# Patient Record
Sex: FEMALE | Race: BLACK OR AFRICAN AMERICAN | NOT HISPANIC OR LATINO | Employment: FULL TIME | URBAN - METROPOLITAN AREA
[De-identification: names, ages, dates, MRNs, and addresses within clinical notes are randomized per-mention and may not be internally consistent; named-entity substitution may affect disease eponyms.]

---

## 2019-09-20 ENCOUNTER — HOSPITAL ENCOUNTER (EMERGENCY)
Facility: HOSPITAL | Age: 29
Discharge: HOME | End: 2019-09-20
Attending: EMERGENCY MEDICINE
Payer: COMMERCIAL

## 2019-09-20 VITALS
HEIGHT: 64 IN | HEART RATE: 92 BPM | TEMPERATURE: 98 F | BODY MASS INDEX: 27.43 KG/M2 | WEIGHT: 160.69 LBS | RESPIRATION RATE: 16 BRPM | OXYGEN SATURATION: 98 % | SYSTOLIC BLOOD PRESSURE: 155 MMHG | DIASTOLIC BLOOD PRESSURE: 92 MMHG

## 2019-09-20 DIAGNOSIS — S61.419A LACERATION OF HAND WITHOUT FOREIGN BODY, UNSPECIFIED LATERALITY, INITIAL ENCOUNTER: Primary | ICD-10-CM

## 2019-09-20 PROCEDURE — 63600175 PHARM REV CODE 636 W HCPCS: Performed by: NURSE PRACTITIONER

## 2019-09-20 PROCEDURE — 99284 EMERGENCY DEPT VISIT MOD MDM: CPT | Mod: 25

## 2019-09-20 PROCEDURE — 90714 TD VACC NO PRESV 7 YRS+ IM: CPT | Performed by: NURSE PRACTITIONER

## 2019-09-20 PROCEDURE — 90471 IMMUNIZATION ADMIN: CPT | Performed by: NURSE PRACTITIONER

## 2019-09-20 PROCEDURE — 12001 RPR S/N/AX/GEN/TRNK 2.5CM/<: CPT

## 2019-09-20 PROCEDURE — 25000003 PHARM REV CODE 250: Performed by: NURSE PRACTITIONER

## 2019-09-20 RX ORDER — MONTELUKAST SODIUM 10 MG/1
1 TABLET ORAL DAILY
COMMUNITY
Start: 2019-03-15

## 2019-09-20 RX ORDER — TRAMADOL HYDROCHLORIDE 50 MG/1
50 TABLET ORAL EVERY 6 HOURS PRN
Qty: 12 TABLET | Refills: 0 | Status: SHIPPED | OUTPATIENT
Start: 2019-09-20 | End: 2019-10-02

## 2019-09-20 RX ORDER — HYDROCODONE BITARTRATE AND ACETAMINOPHEN 5; 325 MG/1; MG/1
1 TABLET ORAL
Status: COMPLETED | OUTPATIENT
Start: 2019-09-20 | End: 2019-09-20

## 2019-09-20 RX ADMIN — HYDROCODONE BITARTRATE AND ACETAMINOPHEN 1 TABLET: 5; 325 TABLET ORAL at 10:09

## 2019-09-20 RX ADMIN — CLOSTRIDIUM TETANI TOXOID ANTIGEN (FORMALDEHYDE INACTIVATED) AND CORYNEBACTERIUM DIPHTHERIAE TOXOID ANTIGEN (FORMALDEHYDE INACTIVATED) 0.5 ML: 5; 2 INJECTION, SUSPENSION INTRAMUSCULAR at 10:09

## 2019-09-20 NOTE — PROVIDER PROGRESS NOTES - EMERGENCY DEPT.
Encounter Date: 9/20/2019    ED Physician Progress Notes        Physician Note:   Correction on number of sutures:  4 sutures placed

## 2019-09-20 NOTE — ED PROVIDER NOTES
Encounter Date: 9/20/2019       History     Chief Complaint   Patient presents with    Laceration     pt reports cutting her Lt hand on a  today while at work     29 year old female with complaint of laceration to left hand X one hour.  Pt reports that she accidentally cut herself on a piece of metal while at work today. Moderate bleeding. Moderate pain.  No numbness or tingling.  No other complaints.         Review of patient's allergies indicates:  No Known Allergies  Past Medical History:   Diagnosis Date    Chronic headaches      Past Surgical History:   Procedure Laterality Date    ROTATOR CUFF REPAIR      SHOULDER SURGERY       Family History   Problem Relation Age of Onset    Breast cancer Neg Hx     Ovarian cancer Neg Hx      Social History     Tobacco Use    Smoking status: Never Smoker   Substance Use Topics    Alcohol use: No    Drug use: No     Review of Systems   Constitutional: Negative for fever.   HENT: Negative for sore throat.    Respiratory: Negative for shortness of breath.    Cardiovascular: Negative for chest pain.   Gastrointestinal: Negative for nausea.   Genitourinary: Negative for dysuria.   Musculoskeletal: Negative for back pain.        Left hand laceration    Skin: Negative for rash.   Neurological: Negative for weakness.   Hematological: Does not bruise/bleed easily.       Physical Exam     Initial Vitals [09/20/19 0943]   BP Pulse Resp Temp SpO2   (!) 155/92 92 16 98.4 °F (36.9 °C) 98 %      MAP       --         Physical Exam    Nursing note and vitals reviewed.  Constitutional: She appears well-developed and well-nourished.   HENT:   Head: Normocephalic and atraumatic.   Eyes: Conjunctivae and EOM are normal. Pupils are equal, round, and reactive to light.   Neck: Normal range of motion. Neck supple.   Cardiovascular: Normal rate, regular rhythm, normal heart sounds and intact distal pulses.   Pulmonary/Chest: Breath sounds normal.   Abdominal: Soft. There is no  tenderness. There is no rebound and no guarding.   Musculoskeletal: Normal range of motion.   2 cm laceration left thenar eminence, no tendon lacerations, full ROM left thumb without difficulty, cap refill brisk   Neurological: She is alert and oriented to person, place, and time. She has normal strength and normal reflexes.   Skin: Skin is warm and dry. Capillary refill takes less than 2 seconds.   Psychiatric: She has a normal mood and affect. Her behavior is normal. Thought content normal.         ED Course   Lac Repair  Date/Time: 9/20/2019 10:09 AM  Performed by: Christiano Powell NP  Authorized by: Bryn Maria MD   Comments: Wound injected with 5 cc plain lidocaine, irrigated with 400 cc NS, cleansed with betadine, no foreign bodies or tendon laceration, wound closed with #3 3-0 prolene simple interrupted sutures        Labs Reviewed - No data to display       Imaging Results    None                               Clinical Impression:       ICD-10-CM ICD-9-CM   1. Laceration of hand without foreign body, unspecified laterality, initial encounter S61.419A 882.0                                Christiano Powell NP  09/20/19 1010       Christiano Powell NP  09/20/19 1012

## 2019-09-20 NOTE — ED NOTES
Pt states she was at work when trying to disassemble a demo phone when a blade slipped and cut her (L) palm near the thumb. Good sensation. Able to move all fingers. Pain 10/10.  Patient identifiers verified and correct for Emeka Miramontes.  LOC: The patient is awake, alert and aware of environment with an appropriate affect, the patient is oriented x 3 and speaking appropriately.  APPEARANCE: Patient resting comfortably and in no acute distress, patient is clean and well groomed, patient's clothing is properly fastened.  SKIN: The skin is warm and dry, color consistent with ethnicity, patient has normal skin turgor and moist mucus membranes.  MUSCULOSKELETAL: Patient moving all extremities spontaneously.  RESPIRATORY: Airway is open and patent, respirations are spontaneous.  CARDIAC: Patient has a normal rate, no periphreal edema noted, capillary refill < 3 seconds.  ABDOMEN: Soft and non tender to palpation.

## 2019-09-25 PROCEDURE — 99283 EMERGENCY DEPT VISIT LOW MDM: CPT | Mod: 25

## 2019-09-25 PROCEDURE — 29125 APPL SHORT ARM SPLINT STATIC: CPT | Mod: LT

## 2019-09-26 ENCOUNTER — HOSPITAL ENCOUNTER (EMERGENCY)
Facility: HOSPITAL | Age: 29
Discharge: HOME | End: 2019-09-26
Attending: EMERGENCY MEDICINE
Payer: COMMERCIAL

## 2019-09-26 VITALS
RESPIRATION RATE: 20 BRPM | WEIGHT: 160.38 LBS | DIASTOLIC BLOOD PRESSURE: 76 MMHG | TEMPERATURE: 99 F | SYSTOLIC BLOOD PRESSURE: 148 MMHG | OXYGEN SATURATION: 100 % | HEIGHT: 65 IN | BODY MASS INDEX: 26.72 KG/M2 | HEART RATE: 90 BPM

## 2019-09-26 DIAGNOSIS — Z51.89 ENCOUNTER FOR WOUND RE-CHECK: Primary | ICD-10-CM

## 2019-09-26 DIAGNOSIS — S60.012A CONTUSION OF LEFT THUMB WITHOUT DAMAGE TO NAIL, INITIAL ENCOUNTER: ICD-10-CM

## 2019-09-26 PROCEDURE — 29125 APPL SHORT ARM SPLINT STATIC: CPT | Mod: LT

## 2019-09-26 NOTE — ED PROVIDER NOTES
History      Chief Complaint   Patient presents with    Wound Check     pt reports she had stitches placed on Friday and noticed some discoloration to palm yesterday       Review of patient's allergies indicates:  No Known Allergies     HPI   HPI    9/26/2019, 12:44 AM   History obtained from the friend and patient      History of Present Illness: Emeka Miramontes is a 29 y.o. female patient with no PMHX presents to the Emergency Department with c/o pain to left palm, thumb, and suture site onset today.  Symptoms are intermittent and moderate in severity. The pt has 4 well approximated sutures noted to left palm posterior thumb with bruising noted. Patient denies any fever, drainage, redness, swelling, numbness or tingling, decrease ROM. All other sxs at this time. No further complaints or concerns at this time.     Arrival mode: Personal vehicle      PCP: Primary Doctor No       Past Medical History:  Past Medical History:   Diagnosis Date    Chronic headaches        Past Surgical History:  Past Surgical History:   Procedure Laterality Date    ROTATOR CUFF REPAIR      SHOULDER SURGERY           Family History:  Family History   Problem Relation Age of Onset    Breast cancer Neg Hx     Ovarian cancer Neg Hx        Social History:  Social History     Tobacco Use    Smoking status: Never Smoker    Smokeless tobacco: Never Used   Substance and Sexual Activity    Alcohol use: No    Drug use: No    Sexual activity: Not Currently       ROS   Review of Systems   Constitutional: Negative for chills and fever.   HENT: Negative for congestion, ear pain, sinus pain and sore throat.    Eyes: Negative for pain.   Respiratory: Negative for cough, chest tightness and shortness of breath.    Cardiovascular: Negative for chest pain.   Gastrointestinal: Negative for abdominal pain, constipation, diarrhea, nausea and vomiting.   Genitourinary: Negative for decreased urine volume, difficulty urinating, dysuria, flank  pain, frequency, hematuria, vaginal bleeding and vaginal discharge.   Musculoskeletal: Negative for back pain, gait problem and neck pain.        Left hand pain      Skin: Positive for wound. Negative for rash.        4 suture to left palm      Neurological: Negative for dizziness, syncope, weakness and headaches.       Physical Exam      Initial Vitals [09/25/19 2356]   BP Pulse Resp Temp SpO2   (!) 148/76 90 20 98.8 °F (37.1 °C) 100 %      MAP       --          Physical Exam   Constitutional: She is oriented to person, place, and time. She appears well-developed and well-nourished. No distress.   HENT:   Head: Normocephalic and atraumatic.   Mouth/Throat: Oropharynx is clear and moist.   Eyes: Pupils are equal, round, and reactive to light. Conjunctivae and EOM are normal.   Neck: Normal range of motion. Neck supple. No neck rigidity. Normal range of motion present.   Cardiovascular: Normal rate, regular rhythm, normal heart sounds and intact distal pulses.   Pulses:       Radial pulses are 2+ on the right side, and 2+ on the left side.   Pulmonary/Chest: Effort normal and breath sounds normal. No respiratory distress. She exhibits no tenderness.   Abdominal: Soft. Bowel sounds are normal. There is no tenderness. There is no rigidity, no rebound and no guarding.   Musculoskeletal: Normal range of motion.        Hands:  Neurological: She is alert and oriented to person, place, and time.   Skin: Skin is warm and dry. Laceration noted.       Nursing Notes and Vital Signs Reviewed.    ED Course    Orthopedic Injury  Date/Time: 9/26/2019 1:29 AM  Performed by: Shae Jacinto NP  Authorized by: Tigre Elizabeth MD     Consent Done?:  Not Needed  Injury:     Injury location:  Hand    Location details:  Left hand    Injury type:  Soft tissue      Pre-procedure assessment:     Neurovascular status: Neurovascularly intact        Selections made in this section will also lock the Injury type section above.:      "Immobilization:  Brace    Splint type: Cock-up wrist splint.    Complications: No      Specimens: No      Implants: No    Post-procedure assessment:     Neurovascular status: Neurovascularly intact      Range of motion: splinted      Patient tolerance:  Patient tolerated the procedure well with no immediate complications      ED Vital Signs:  Vitals:    09/25/19 2356   BP: (!) 148/76   Pulse: 90   Resp: 20   Temp: 98.8 °F (37.1 °C)   TempSrc: Oral   SpO2: 100%   Weight: 72.7 kg (160 lb 6.2 oz)   Height: 5' 4.5" (1.638 m)       Abnormal Lab Results:  Labs Reviewed - No data to display     All Lab Results:  NONE    Imaging Results:  Imaging Results          X-Ray Hand 3 View Left (Preliminary result)  Result time 09/26/19 01:05:28    ED Interpretation by Shae Jacinto NP (09/26/19 01:05:28, [facility] - , Emergency Medicine)    I, hSae Jacinto NP independently interpreted left hand xray. Findings: No acute findings noted, no dislocation or fracture. Final results are pending radiologist review.                                             The Emergency Provider reviewed the vital signs and test results, which are outlined above.    ED Discussion     1:25 AM: Reassessed pt at this time. Pt states their condition has improved at this time. Discussed with pt  And family no acute findings on xray. Discussed pt dx of hand contusion, follow up with PCP or Orthopedic.  Informed patient to have wound re-evaluated at day 10 and possible sutures removed.  Discussed with patient we will give wrist splint and to wear for comfort.  Also informed pt and family the xrays were pending final radiologist review and will call with any acute findings.   All questions and concerns were addressed at this time. Pt expresses understanding of information and instructions, and is comfortable with plan to discharge. Pt is stable for discharge.    I discussed with patient and/or family/caretaker that negative X-ray does " not rule out occult fracture or other soft tissue injury.  Persistent pain greater than 7-10 days or increased pain requires follow up, specifically with orthopedics.      I discussed with patient and/or family/caretaker that evaluation in the ED does not suggest any emergent or life threatening medical conditions requiring immediate intervention beyond what was provided in the ED, and I believe patient is safe for discharge.  Regardless, an unremarkable evaluation in the ED does not preclude the development or presence of a serious of life threatening condition. As such, patient was instructed to return immediately for any worsening or change in current symptoms.    ED Medication(s):  Medications - No data to display  Discharge Medication List as of 9/26/2019  1:28 AM         Follow-up Information     [facility] [place] In 4 days.    Why:  For wound re-check, Return to ED for any concerns.  Contact information:  [address]  [address] [place] [address]  [phone]                       Medical Decision Making                     Clinical Impression       ICD-10-CM ICD-9-CM   1. Encounter for wound re-check Z51.89 V58.89   2. Contusion of left thumb without damage to nail, initial encounter S60.012A 923.3       Disposition:   Disposition: Discharged  Condition: Stable  .     Shae Jacinto NP  09/26/19 0136       Shae Jacinto NP  09/26/19 0157

## 2023-05-04 ENCOUNTER — HOSPITAL ENCOUNTER (OUTPATIENT)
Dept: CARDIOLOGY | Facility: HOSPITAL | Age: 33
Discharge: HOME | End: 2023-05-04
Attending: FAMILY MEDICINE
Payer: COMMERCIAL

## 2023-05-04 ENCOUNTER — OFFICE VISIT (OUTPATIENT)
Dept: INTERNAL MEDICINE | Facility: CLINIC | Age: 33
End: 2023-05-04
Payer: COMMERCIAL

## 2023-05-04 VITALS
WEIGHT: 197.06 LBS | BODY MASS INDEX: 32.83 KG/M2 | SYSTOLIC BLOOD PRESSURE: 136 MMHG | DIASTOLIC BLOOD PRESSURE: 100 MMHG | TEMPERATURE: 99 F | HEIGHT: 65 IN

## 2023-05-04 DIAGNOSIS — Z13.1 SCREENING FOR DIABETES MELLITUS: ICD-10-CM

## 2023-05-04 DIAGNOSIS — R11.2 NAUSEA AND VOMITING, UNSPECIFIED VOMITING TYPE: ICD-10-CM

## 2023-05-04 DIAGNOSIS — Z13.220 ENCOUNTER FOR LIPID SCREENING FOR CARDIOVASCULAR DISEASE: ICD-10-CM

## 2023-05-04 DIAGNOSIS — K92.0 HEMATEMESIS OF UNKNOWN ETIOLOGY: Primary | ICD-10-CM

## 2023-05-04 DIAGNOSIS — Z01.419 PAP SMEAR, AS PART OF ROUTINE GYNECOLOGICAL EXAMINATION: ICD-10-CM

## 2023-05-04 DIAGNOSIS — I10 PRIMARY HYPERTENSION: Chronic | ICD-10-CM

## 2023-05-04 DIAGNOSIS — Z11.3 ROUTINE SCREENING FOR STI (SEXUALLY TRANSMITTED INFECTION): ICD-10-CM

## 2023-05-04 DIAGNOSIS — G43.109 MIGRAINE WITH AURA AND WITHOUT STATUS MIGRAINOSUS, NOT INTRACTABLE: Chronic | ICD-10-CM

## 2023-05-04 DIAGNOSIS — Z13.6 ENCOUNTER FOR LIPID SCREENING FOR CARDIOVASCULAR DISEASE: ICD-10-CM

## 2023-05-04 DIAGNOSIS — R10.13 EPIGASTRIC DISCOMFORT: ICD-10-CM

## 2023-05-04 DIAGNOSIS — Z11.4 SCREENING FOR HIV WITHOUT PRESENCE OF RISK FACTORS: ICD-10-CM

## 2023-05-04 PROCEDURE — 99999 PR PBB SHADOW E&M-EST. PATIENT-LVL IV: CPT | Mod: PBBFAC,,, | Performed by: FAMILY MEDICINE

## 2023-05-04 PROCEDURE — 99999 PR PBB SHADOW E&M-EST. PATIENT-LVL IV: ICD-10-PCS | Mod: PBBFAC,,, | Performed by: FAMILY MEDICINE

## 2023-05-04 PROCEDURE — 93005 ELECTROCARDIOGRAM TRACING: CPT

## 2023-05-04 PROCEDURE — 99204 OFFICE O/P NEW MOD 45 MIN: CPT | Mod: S$GLB,,, | Performed by: FAMILY MEDICINE

## 2023-05-04 PROCEDURE — 99204 PR OFFICE/OUTPT VISIT, NEW, LEVL IV, 45-59 MIN: ICD-10-PCS | Mod: S$GLB,,, | Performed by: FAMILY MEDICINE

## 2023-05-04 PROCEDURE — 93010 EKG 12-LEAD: ICD-10-PCS | Mod: ,,, | Performed by: INTERNAL MEDICINE

## 2023-05-04 PROCEDURE — 93010 ELECTROCARDIOGRAM REPORT: CPT | Mod: ,,, | Performed by: INTERNAL MEDICINE

## 2023-05-04 RX ORDER — CEFUROXIME AXETIL 500 MG/1
500 TABLET ORAL 2 TIMES DAILY
COMMUNITY
Start: 2023-02-14 | End: 2023-05-04

## 2023-05-04 RX ORDER — PANTOPRAZOLE SODIUM 40 MG/1
40 TABLET, DELAYED RELEASE ORAL EVERY MORNING
Qty: 30 TABLET | Refills: 1 | Status: SHIPPED | OUTPATIENT
Start: 2023-05-04 | End: 2023-06-08 | Stop reason: SDUPTHER

## 2023-05-04 RX ORDER — FAMOTIDINE 20 MG/1
20 TABLET, FILM COATED ORAL
COMMUNITY
Start: 2023-05-01 | End: 2023-05-04

## 2023-05-04 RX ORDER — VERAPAMIL HYDROCHLORIDE 40 MG/1
TABLET ORAL
COMMUNITY
Start: 2023-03-16

## 2023-05-04 RX ORDER — FREMANEZUMAB-VFRM 225 MG/1.5ML
INJECTION SUBCUTANEOUS
COMMUNITY
End: 2023-05-04

## 2023-05-04 RX ORDER — CHLORTHALIDONE 25 MG/1
25 TABLET ORAL DAILY
Qty: 30 TABLET | Refills: 0 | Status: SHIPPED | OUTPATIENT
Start: 2023-05-04 | End: 2023-05-25 | Stop reason: SDUPTHER

## 2023-05-04 RX ORDER — PROMETHAZINE HYDROCHLORIDE AND DEXTROMETHORPHAN HYDROBROMIDE 6.25; 15 MG/5ML; MG/5ML
SYRUP ORAL
COMMUNITY
Start: 2023-03-16 | End: 2023-05-04

## 2023-05-04 RX ORDER — PREDNISONE 20 MG/1
20 TABLET ORAL
COMMUNITY
Start: 2023-02-14 | End: 2023-05-04

## 2023-05-04 RX ORDER — NABUMETONE 750 MG/1
750 TABLET, FILM COATED ORAL 2 TIMES DAILY PRN
COMMUNITY
Start: 2023-04-27 | End: 2023-05-04

## 2023-05-04 NOTE — PATIENT INSTRUCTIONS
2023        TO WHOM IT MAY CONCERN:     RE:  Emeka Miramontes ,  1990     Artur was treated by me on 23.    Artur reports that her symptoms started on Monday.    She is estimated to be able to return to work without restrictions in 2-3 days.    Please extend to Artur all due courtesy and consideration and excuse her absence.    Sincerely,     CAITLIN Cox MD

## 2023-05-04 NOTE — PROGRESS NOTES
OFFICE VISIT 5/4/23 10:30 AM CDT    Subjective   CHIEF COMPLAINT: Emesis and Abdominal Pain    She reports Monday she started having epigastric discomfort, nausea and vomiting. She went to Urgent Care clinic on 5/1 where they reportedly evaluated her with urine pregnancy test and test for Strep throat, but no other labs. Last emesis was yesterday morning. She says that vomiting started out as clear, but individually became pink and then red. No coffee ground emesis reported. She says that yesterday she consumed exclusively electrolyte drinks. She says that her symptoms were worst on Monday and Tuesday, and they are at least 40% improved today and getting better. She is able to drink adequately. Last bowel movement was yesterday and was reported as normal. No apparent blood in stool. No dark or tar like stool. She does not report chronic GERD symptoms. No history of peptic ulcer disease. She has never had upper G.I. No family history of peptic ulcer disease or G.I. cancer. She does not smoke, and she describes her alcohol consumption as modest. She reports occasional NSAID use for musculoskeletal pain, but she says she does not use NSAIDs on a chronic basis. I cautioned her against using any NSAID until her current G.I. problem is resolved. She does not appear dehydrated. Physical exam reveals very minimal generalized abdominal tenderness, but no epigastric or other focal/regional area of tenderness. We discussed differential diagnosis and risks and benefits of treatment options. As she is improving, it was agreed to proceed with evaluation as ordered, empiric treatment with proton pump inhibitor, and G.I. consult for further recommendations.    Hypertension is asymptomatic, but uncontrolled, effectively untreated. She is on verapamil prescribed by her neurologist for migraines. It appears that she was on lisinopril about a year ago, but she stopped it for unclear reasons.    She reports that her migraines are  "compensated/controlled and stable.    Review of Systems   Constitutional:  Negative for chills, diaphoresis and fever.   HENT:  Negative for sore throat.    Respiratory:  Negative for chest tightness and shortness of breath.    Cardiovascular:  Negative for chest pain.   Gastrointestinal:  Positive for abdominal pain, nausea and vomiting. Negative for abdominal distention and blood in stool.   Endocrine: Negative for polydipsia and polyuria.   Genitourinary:  Negative for dysuria, flank pain, hematuria and pelvic pain.       Objective   Vitals:    05/04/23 1105 05/04/23 1109   BP: (!) 138/102 (!) 136/100   BP Location: Right arm Left arm   Patient Position: Sitting Sitting   BP Method: Large (Manual) Large (Manual)   Temp: 98.6 °F (37 °C)    Weight: 89.4 kg (197 lb 1.5 oz)    Height: 5' 5" (1.651 m)    Physical Exam  Vitals reviewed.   Constitutional:       General: She is not in acute distress.     Appearance: Normal appearance. She is not ill-appearing, toxic-appearing or diaphoretic.   HENT:      Head: Normocephalic and atraumatic.      Mouth/Throat:      Mouth: Mucous membranes are moist.   Eyes:      General: No scleral icterus.     Conjunctiva/sclera: Conjunctivae normal.   Cardiovascular:      Rate and Rhythm: Normal rate and regular rhythm.      Heart sounds: Normal heart sounds.   Pulmonary:      Effort: Pulmonary effort is normal.      Breath sounds: Normal breath sounds.   Abdominal:      General: Bowel sounds are normal. There is no distension.      Palpations: Abdomen is soft. There is no mass.      Tenderness: There is abdominal tenderness (very minimial generalized). There is no guarding or rebound.   Skin:     General: Skin is warm and dry.   Neurological:      General: No focal deficit present.      Mental Status: She is alert and oriented to person, place, and time. Mental status is at baseline.   Psychiatric:         Mood and Affect: Mood normal.         Behavior: Behavior normal.         Thought " Content: Thought content normal.         Judgment: Judgment normal.        Assessment and Plan   1. Hematemesis of unknown etiology  -     pantoprazole (PROTONIX) 40 MG tablet; Take 1 tablet (40 mg total) by mouth every morning.  Dispense: 30 tablet; Refill: 1  -     CBC Without Differential; Future; Expected date: 05/04/2023  -     Comprehensive Metabolic Panel; Future; Expected date: 05/04/2023  -     E-Consult to Gastroenterology    2. Primary hypertension  Overview:  BP Readings from Last 6 Encounters:   05/04/23 (!) 136/100   03/15/22 (!) 140/80   09/25/19 (!) 148/76   09/20/19 (!) 155/92   07/20/16 112/74   08/11/15 120/82   Neurologist prescribed Calan (verapamil) 40 mg BID for migraine prevention.    Orders:  -     chlorthalidone (HYGROTEN) 25 MG Tab; Take 1 tablet (25 mg total) by mouth once daily.  Dispense: 30 tablet; Refill: 0  -     SCHEDULED EKG 12-LEAD (to Muse); Future    3. Migraine with aura and without status migrainosus, not intractable    4. Epigastric discomfort  -     pantoprazole (PROTONIX) 40 MG tablet; Take 1 tablet (40 mg total) by mouth every morning.  Dispense: 30 tablet; Refill: 1  -     CBC Without Differential; Future; Expected date: 05/04/2023  -     Comprehensive Metabolic Panel; Future; Expected date: 05/04/2023    5. Screening for HIV without presence of risk factors  -     HIV 1/2 Ag/Ab (4th Gen); Future; Expected date: 05/04/2023    6. Screening for diabetes mellitus  -     Hemoglobin A1C; Future; Expected date: 05/04/2023    7. Encounter for lipid screening for cardiovascular disease  -     Lipid Panel; Future; Expected date: 05/04/2023    8. Pap smear, as part of routine gynecological examination  -     Ambulatory referral/consult to Gynecology; Future; Expected date: 05/11/2023    9. Routine screening for STI (sexually transmitted infection)  -     RPR; Future; Expected date: 05/04/2023    10. Nausea and vomiting  -     E-Consult to Gastroenterology    Unless noted herein,  any chronic conditions are represented as and appear stable, and no other significant complaints or concerns were reported.    Follow up in about 1 week (around 5/11/2023) for review test results, discuss treatment plan, re-evaluation.   Future Appointments   Date Time Provider Department Center   5/19/2023  1:15 PM PRE-ADMIT, ENDO -[facility] Mitzy Modi   5/25/2023 10:00 AM Desirae Aguilera [facility]        Assessment and Plan    1. Primary hypertension  - chlorthalidone (HYGROTEN) 25 MG Tab; Take 1 tablet (25 mg total) by mouth once daily.  Dispense: 30 tablet; Refill: 0  - SCHEDULED EKG 12-LEAD (to Muse); Future    2. Hematemesis of unknown etiology  - pantoprazole (PROTONIX) 40 MG tablet; Take 1 tablet (40 mg total) by mouth every morning.  Dispense: 30 tablet; Refill: 1  - CBC Without Differential; Future  - Comprehensive Metabolic Panel; Future  - E-Consult to Gastroenterology    3. Migraine with aura and without status migrainosus, not intractable    4. Epigastric discomfort  - pantoprazole (PROTONIX) 40 MG tablet; Take 1 tablet (40 mg total) by mouth every morning.  Dispense: 30 tablet; Refill: 1  - CBC Without Differential; Future  - Comprehensive Metabolic Panel; Future    5. Screening for HIV without presence of risk factors  - HIV 1/2 Ag/Ab (4th Gen); Future    6. Screening for diabetes mellitus  - Hemoglobin A1C; Future    7. Encounter for lipid screening for cardiovascular disease  - Lipid Panel; Future    8. Pap smear, as part of routine gynecological examination  - Ambulatory referral/consult to Gynecology; Future    9. Routine screening for STI (sexually transmitted infection)  - RPR; Future    10. Nausea and vomiting  - E-Consult to Gastroenterology    Orders Placed This Encounter   Procedures    CBC Without Differential    Comprehensive Metabolic Panel    Hemoglobin A1C    Lipid Panel    HIV 1/2 Ag/Ab (4th Gen)    RPR    Ambulatory referral/consult to Gynecology  "   SCHEDULED EKG 12-LEAD (to Muse)        Documentation entered by me for this encounter may have been done in part using speech-recognition technology. Although I have made an effort to ensure accuracy, "sound like" errors may exist and should be interpreted in context.   "

## 2023-05-08 ENCOUNTER — E-CONSULT (OUTPATIENT)
Dept: GASTROENTEROLOGY | Facility: HOSPITAL | Age: 33
End: 2023-05-08
Payer: COMMERCIAL

## 2023-05-08 DIAGNOSIS — R10.13 EPIGASTRIC PAIN: Primary | ICD-10-CM

## 2023-05-08 PROCEDURE — 99446 PR INTERPROF, PHONE/INTERNET/EHR, CONSULT, 5-10 MINS: ICD-10-PCS | Mod: ,,, | Performed by: INTERNAL MEDICINE

## 2023-05-08 PROCEDURE — 99446 NTRPROF PH1/NTRNET/EHR 5-10: CPT | Mod: ,,, | Performed by: INTERNAL MEDICINE

## 2023-05-08 NOTE — CONSULTS
[facility] GASTROENTEROLOGY  Response for E-Consult     Patient Name: Emeka Miramontes  MRN: [id number]  Primary Care Provider: RAJAT Cxo MD   Requesting Provider: RAJAT Cox MD  E-Consult to Gastroenterology  Consult performed by: Rosario Stock MD  Consult ordered by: RAJAT Cox MD  Reason for consult: epigastric pain        Findings: 32 y.o female with epigastric pain and emesis. No coffee grounds, questionable hematemesis. Occasional NSAID use. No melena, physical exam benign per PCP. Labs on 5/4/23 normal CBC and CMP except elevated total protein. Needs EGD.    Recommendations:  Place order for referral to Endo for EGD. In comments place: Refer to econsult by Dr. Stock    I did not speak to the requesting provider verbally about this.     Total time of Consultation: 10 minute    Percentage of time spent on written/verbal discussion: 25%     *This eConsult is based on the clinical data available to me and is furnished without benefit of a physical examination. The eConsult will need to be interpreted in light of any clinical issues or changes in patient status not available to me at the time of filing this eConsults. Significant changes in patient condition or level of acuity should result in immediate formal consultation and reevaluation. Please alert me if you have further questions.    Thank you for your consult.     Rosario Stock MD  [facility] GASTROENTEROLOGY

## 2023-05-11 ENCOUNTER — OFFICE VISIT (OUTPATIENT)
Dept: INTERNAL MEDICINE | Facility: CLINIC | Age: 33
End: 2023-05-11
Payer: COMMERCIAL

## 2023-05-11 VITALS
HEIGHT: 65 IN | TEMPERATURE: 97 F | OXYGEN SATURATION: 98 % | DIASTOLIC BLOOD PRESSURE: 98 MMHG | WEIGHT: 194.25 LBS | SYSTOLIC BLOOD PRESSURE: 130 MMHG | HEART RATE: 80 BPM | BODY MASS INDEX: 32.36 KG/M2

## 2023-05-11 DIAGNOSIS — R11.2 NAUSEA AND VOMITING, UNSPECIFIED VOMITING TYPE: Primary | ICD-10-CM

## 2023-05-11 DIAGNOSIS — I10 PRIMARY HYPERTENSION: ICD-10-CM

## 2023-05-11 PROCEDURE — 99213 OFFICE O/P EST LOW 20 MIN: CPT | Mod: S$GLB,,, | Performed by: PHYSICIAN ASSISTANT

## 2023-05-11 PROCEDURE — 99999 PR PBB SHADOW E&M-EST. PATIENT-LVL V: CPT | Mod: PBBFAC,,, | Performed by: PHYSICIAN ASSISTANT

## 2023-05-11 PROCEDURE — 99999 PR PBB SHADOW E&M-EST. PATIENT-LVL V: ICD-10-PCS | Mod: PBBFAC,,, | Performed by: PHYSICIAN ASSISTANT

## 2023-05-11 PROCEDURE — 99213 PR OFFICE/OUTPT VISIT, EST, LEVL III, 20-29 MIN: ICD-10-PCS | Mod: S$GLB,,, | Performed by: PHYSICIAN ASSISTANT

## 2023-05-11 RX ORDER — OLMESARTAN MEDOXOMIL 20 MG/1
20 TABLET ORAL DAILY
Qty: 30 TABLET | Refills: 0 | Status: SHIPPED | OUTPATIENT
Start: 2023-05-11 | End: 2023-05-25 | Stop reason: DRUGHIGH

## 2023-05-11 NOTE — PROGRESS NOTES
Subjective:      Patient ID: Emeka Miramontes is a 32 y.o. female.    Chief Complaint: Follow-up    HPI  Here today for a follow up. Last visit added chlorthalidone to get better blood pressure control. She is also on verapamil but mainly for headache prevention. Does not check her blood pressure at home.   Reviewed e-consult with GI. Recommend EGD. Scope was never ordered. Will order for her today.   Pt reports that she has not had an episode of vomiting since Tuesday but still nauseated and mild epigastric pain. Vomit was clear on Tuesday, no blood.      Patient Active Problem List   Diagnosis    Dysmenorrhea    Primary hypertension    Migraine with aura and without status migrainosus, not intractable         Current Outpatient Medications:     ALBUTEROL INHL, Inhale into the lungs., Disp: , Rfl:     amitriptyline (ELAVIL) 25 MG tablet, , Disp: , Rfl:     busPIRone (BUSPAR) 15 MG tablet, TAKE 1/2 TABLET BY MOUTH TWICE DAILY FOR ANXIETY, Disp: , Rfl:     chlorthalidone (HYGROTEN) 25 MG Tab, Take 1 tablet (25 mg total) by mouth once daily., Disp: 30 tablet, Rfl: 0    montelukast (SINGULAIR) 10 mg tablet, Take 1 tablet by mouth once daily., Disp: , Rfl:     ondansetron (ZOFRAN) 4 MG tablet, , Disp: , Rfl:     pantoprazole (PROTONIX) 40 MG tablet, Take 1 tablet (40 mg total) by mouth every morning., Disp: 30 tablet, Rfl: 1    UBROGEPANT 100 mg tablet, Take 1 tablet   as needed take one tablet at the onset of headache. Can repeat in 2 hours if needed. Max 200 mg/day, Disp: , Rfl:     verapamiL (CALAN) 40 MG Tab, verapamil 40 mg tablet  TAKE 1 TABLET BY MOUTH TWICE DAILY, Disp: , Rfl:     olmesartan (BENICAR) 20 MG tablet, Take 1 tablet (20 mg total) by mouth once daily., Disp: 30 tablet, Rfl: 0    Review of Systems   Constitutional:  Negative for activity change, appetite change, chills, diaphoresis, fatigue, fever and unexpected weight change.   HENT: Negative.  Negative for congestion, hearing loss, postnasal  "drip, rhinorrhea, sore throat, trouble swallowing and voice change.    Eyes: Negative.  Negative for visual disturbance.   Respiratory: Negative.  Negative for cough, choking, chest tightness and shortness of breath.    Cardiovascular:  Negative for chest pain, palpitations and leg swelling.   Gastrointestinal:  Positive for abdominal pain and nausea. Negative for abdominal distention, blood in stool, constipation, diarrhea and vomiting.   Endocrine: Negative for cold intolerance, heat intolerance, polydipsia and polyuria.   Genitourinary: Negative.  Negative for difficulty urinating and frequency.   Musculoskeletal:  Negative for arthralgias, back pain, gait problem, joint swelling and myalgias.   Skin:  Negative for color change, pallor, rash and wound.   Neurological:  Positive for headaches. Negative for dizziness, tremors, weakness, light-headedness and numbness.   Hematological:  Negative for adenopathy.   Psychiatric/Behavioral:  Negative for behavioral problems, confusion, self-injury, sleep disturbance and suicidal ideas. The patient is not nervous/anxious.    Objective:   BP (!) 130/98 (BP Location: Left arm, Patient Position: Sitting, BP Method: Medium (Manual))   Pulse 80   Temp 97 °F (36.1 °C) (Tympanic)   Ht 5' 5" (1.651 m)   Wt 88.1 kg (194 lb 3.6 oz)   SpO2 98%   BMI 32.32 kg/m²     Physical Exam  Vitals reviewed.   Constitutional:       General: She is not in acute distress.     Appearance: Normal appearance. She is well-developed. She is not ill-appearing, toxic-appearing or diaphoretic.   HENT:      Head: Normocephalic and atraumatic.      Right Ear: External ear normal.      Left Ear: External ear normal.      Nose: Nose normal.   Eyes:      Conjunctiva/sclera: Conjunctivae normal.      Pupils: Pupils are equal, round, and reactive to light.   Cardiovascular:      Rate and Rhythm: Normal rate and regular rhythm.      Heart sounds: Normal heart sounds. No murmur heard.    No friction rub. No " gallop.   Pulmonary:      Effort: Pulmonary effort is normal. No respiratory distress.      Breath sounds: Normal breath sounds. No wheezing or rales.   Chest:      Chest wall: No tenderness.   Abdominal:      General: There is no distension.      Palpations: Abdomen is soft.      Tenderness: There is no abdominal tenderness.   Musculoskeletal:         General: Normal range of motion.      Cervical back: Normal range of motion and neck supple.   Lymphadenopathy:      Cervical: No cervical adenopathy.   Skin:     General: Skin is warm and dry.      Capillary Refill: Capillary refill takes less than 2 seconds.      Findings: No rash.   Neurological:      Mental Status: She is alert and oriented to person, place, and time.      Motor: No weakness.      Coordination: Coordination normal.      Gait: Gait normal.   Psychiatric:         Mood and Affect: Mood normal.         Behavior: Behavior normal.         Thought Content: Thought content normal.         Judgment: Judgment normal.       Assessment:     1. Nausea and vomiting, unspecified vomiting type    2. Primary hypertension      Plan:   Nausea and vomiting, unspecified vomiting type  -     Ambulatory referral/consult to Endo Procedure ; Future; Expected date: 05/12/2023    Primary hypertension  -     olmesartan (BENICAR) 20 MG tablet; Take 1 tablet (20 mg total) by mouth once daily.  Dispense: 30 tablet; Refill: 0      -continue chlorthalidone and verapamil. Add olmesartan  -recheck in 2 weeks.     Follow up in 2 weeks (on 5/25/2023), or if symptoms worsen or fail to improve.

## 2023-05-19 ENCOUNTER — HOSPITAL ENCOUNTER (OUTPATIENT)
Dept: PREADMISSION TESTING | Facility: HOSPITAL | Age: 33
Discharge: HOME | End: 2023-05-19
Attending: COLON & RECTAL SURGERY
Payer: COMMERCIAL

## 2023-05-19 DIAGNOSIS — R11.2 NAUSEA AND VOMITING, UNSPECIFIED VOMITING TYPE: Primary | ICD-10-CM

## 2023-05-25 ENCOUNTER — OFFICE VISIT (OUTPATIENT)
Dept: INTERNAL MEDICINE | Facility: CLINIC | Age: 33
End: 2023-05-25
Payer: COMMERCIAL

## 2023-05-25 VITALS
BODY MASS INDEX: 31.92 KG/M2 | DIASTOLIC BLOOD PRESSURE: 90 MMHG | OXYGEN SATURATION: 98 % | TEMPERATURE: 98 F | WEIGHT: 191.56 LBS | HEIGHT: 65 IN | HEART RATE: 78 BPM | SYSTOLIC BLOOD PRESSURE: 132 MMHG

## 2023-05-25 DIAGNOSIS — I10 PRIMARY HYPERTENSION: Primary | Chronic | ICD-10-CM

## 2023-05-25 PROCEDURE — 99999 PR PBB SHADOW E&M-EST. PATIENT-LVL IV: ICD-10-PCS | Mod: PBBFAC,,, | Performed by: PHYSICIAN ASSISTANT

## 2023-05-25 PROCEDURE — 99213 PR OFFICE/OUTPT VISIT, EST, LEVL III, 20-29 MIN: ICD-10-PCS | Mod: S$GLB,,, | Performed by: PHYSICIAN ASSISTANT

## 2023-05-25 PROCEDURE — 99999 PR PBB SHADOW E&M-EST. PATIENT-LVL IV: CPT | Mod: PBBFAC,,, | Performed by: PHYSICIAN ASSISTANT

## 2023-05-25 PROCEDURE — 99213 OFFICE O/P EST LOW 20 MIN: CPT | Mod: S$GLB,,, | Performed by: PHYSICIAN ASSISTANT

## 2023-05-25 RX ORDER — CHLORTHALIDONE 25 MG/1
25 TABLET ORAL DAILY
Qty: 90 TABLET | Refills: 3 | Status: SHIPPED | OUTPATIENT
Start: 2023-05-25 | End: 2023-12-07 | Stop reason: SDUPTHER

## 2023-05-25 RX ORDER — OLMESARTAN MEDOXOMIL 40 MG/1
40 TABLET ORAL DAILY
Qty: 90 TABLET | Refills: 3 | Status: SHIPPED | OUTPATIENT
Start: 2023-05-25 | End: 2023-12-07 | Stop reason: SDUPTHER

## 2023-05-25 NOTE — PROGRESS NOTES
Subjective:      Patient ID: Emeka Miramontes is a 32 y.o. female.    Chief Complaint: Follow-up    HPI  Here today for a blood pressure follow up after adding olmesartan. She is also on chlorthalidone and verapamil.   Doing well on both medications without any SE.   No chest pain. Still having some epigastric pain, nausea, and vomiting. Scheduled for an EGD on Tuesday.     Patient Active Problem List   Diagnosis    Dysmenorrhea    Primary hypertension    Migraine with aura and without status migrainosus, not intractable         Current Outpatient Medications:     ALBUTEROL INHL, Inhale into the lungs., Disp: , Rfl:     amitriptyline (ELAVIL) 25 MG tablet, , Disp: , Rfl:     busPIRone (BUSPAR) 15 MG tablet, TAKE 1/2 TABLET BY MOUTH TWICE DAILY FOR ANXIETY, Disp: , Rfl:     montelukast (SINGULAIR) 10 mg tablet, Take 1 tablet by mouth once daily., Disp: , Rfl:     ondansetron (ZOFRAN) 4 MG tablet, , Disp: , Rfl:     pantoprazole (PROTONIX) 40 MG tablet, Take 1 tablet (40 mg total) by mouth every morning., Disp: 30 tablet, Rfl: 1    UBROGEPANT 100 mg tablet, Take 1 tablet   as needed take one tablet at the onset of headache. Can repeat in 2 hours if needed. Max 200 mg/day, Disp: , Rfl:     verapamiL (CALAN) 40 MG Tab, verapamil 40 mg tablet  TAKE 1 TABLET BY MOUTH TWICE DAILY, Disp: , Rfl:     chlorthalidone (HYGROTEN) 25 MG Tab, Take 1 tablet (25 mg total) by mouth once daily., Disp: 90 tablet, Rfl: 3    olmesartan (BENICAR) 40 MG tablet, Take 1 tablet (40 mg total) by mouth once daily., Disp: 90 tablet, Rfl: 3    Review of Systems   Constitutional:  Negative for activity change, appetite change, chills, diaphoresis, fatigue, fever and unexpected weight change.   HENT: Negative.  Negative for congestion, hearing loss, postnasal drip, rhinorrhea, sore throat, trouble swallowing and voice change.    Eyes: Negative.  Negative for visual disturbance.   Respiratory: Negative.  Negative for cough, choking, chest  "tightness and shortness of breath.    Cardiovascular:  Negative for chest pain, palpitations and leg swelling.   Gastrointestinal:  Negative for abdominal distention, abdominal pain, blood in stool, constipation, diarrhea, nausea and vomiting.   Endocrine: Negative for cold intolerance, heat intolerance, polydipsia and polyuria.   Genitourinary: Negative.  Negative for difficulty urinating and frequency.   Musculoskeletal:  Negative for arthralgias, back pain, gait problem, joint swelling and myalgias.   Skin:  Negative for color change, pallor, rash and wound.   Neurological:  Negative for dizziness, tremors, weakness, light-headedness, numbness and headaches.   Hematological:  Negative for adenopathy.   Psychiatric/Behavioral:  Negative for behavioral problems, confusion, self-injury, sleep disturbance and suicidal ideas. The patient is not nervous/anxious.    Objective:   BP (!) 132/90 (BP Location: Left arm, Patient Position: Sitting, BP Method: Medium (Manual))   Pulse 78   Temp 97.8 °F (36.6 °C) (Tympanic)   Ht 5' 5" (1.651 m)   Wt 86.9 kg (191 lb 9.3 oz)   SpO2 98%   BMI 31.88 kg/m²     Physical Exam  Vitals and nursing note reviewed.   Constitutional:       General: She is not in acute distress.     Appearance: Normal appearance. She is well-developed. She is not ill-appearing, toxic-appearing or diaphoretic.   HENT:      Head: Normocephalic and atraumatic.   Cardiovascular:      Rate and Rhythm: Normal rate and regular rhythm.      Heart sounds: Normal heart sounds. No murmur heard.    No friction rub. No gallop.   Pulmonary:      Effort: Pulmonary effort is normal. No respiratory distress.      Breath sounds: Normal breath sounds. No wheezing or rales.   Musculoskeletal:         General: Normal range of motion.   Skin:     General: Skin is warm.      Capillary Refill: Capillary refill takes less than 2 seconds.      Findings: No rash.   Neurological:      Mental Status: She is alert and oriented to " person, place, and time.      Motor: No weakness.      Gait: Gait normal.   Psychiatric:         Mood and Affect: Mood normal.         Behavior: Behavior normal.         Thought Content: Thought content normal.         Judgment: Judgment normal.       Assessment:     1. Primary hypertension      Plan:   Primary hypertension  -     olmesartan (BENICAR) 40 MG tablet; Take 1 tablet (40 mg total) by mouth once daily.  Dispense: 90 tablet; Refill: 3  -     chlorthalidone (HYGROTEN) 25 MG Tab; Take 1 tablet (25 mg total) by mouth once daily.  Dispense: 90 tablet; Refill: 3    -increase olmesartan from 20 to 40.   -continue chlorthalidone and verapamil as prescribed    Follow up in about 2 weeks (around 6/8/2023), or if symptoms worsen or fail to improve.

## 2023-05-30 ENCOUNTER — ANESTHESIA EVENT (OUTPATIENT)
Dept: ENDOSCOPY | Facility: HOSPITAL | Age: 33
End: 2023-05-30
Payer: COMMERCIAL

## 2023-05-30 ENCOUNTER — ANESTHESIA (OUTPATIENT)
Dept: ENDOSCOPY | Facility: HOSPITAL | Age: 33
End: 2023-05-30
Payer: COMMERCIAL

## 2023-05-30 ENCOUNTER — HOSPITAL ENCOUNTER (OUTPATIENT)
Facility: HOSPITAL | Age: 33
Discharge: HOME | End: 2023-05-30
Attending: INTERNAL MEDICINE | Admitting: INTERNAL MEDICINE
Payer: COMMERCIAL

## 2023-05-30 DIAGNOSIS — R11.14 BILIOUS VOMITING WITH NAUSEA: Primary | ICD-10-CM

## 2023-05-30 DIAGNOSIS — R10.13 EPIGASTRIC PAIN: ICD-10-CM

## 2023-05-30 PROBLEM — R63.4 WEIGHT LOSS, UNINTENTIONAL: Status: ACTIVE | Noted: 2023-05-30

## 2023-05-30 LAB
B-HCG UR QL: NEGATIVE
CTP QC/QA: YES

## 2023-05-30 PROCEDURE — 63600175 PHARM REV CODE 636 W HCPCS: Performed by: NURSE ANESTHETIST, CERTIFIED REGISTERED

## 2023-05-30 PROCEDURE — 43239 EGD BIOPSY SINGLE/MULTIPLE: CPT | Performed by: INTERNAL MEDICINE

## 2023-05-30 PROCEDURE — 43239 EGD BIOPSY SINGLE/MULTIPLE: CPT | Mod: ,,, | Performed by: INTERNAL MEDICINE

## 2023-05-30 PROCEDURE — 63600175 PHARM REV CODE 636 W HCPCS: Performed by: INTERNAL MEDICINE

## 2023-05-30 PROCEDURE — 37000008 HC ANESTHESIA 1ST 15 MINUTES: Performed by: INTERNAL MEDICINE

## 2023-05-30 PROCEDURE — 37000009 HC ANESTHESIA EA ADD 15 MINS: Performed by: INTERNAL MEDICINE

## 2023-05-30 PROCEDURE — 27201012 HC FORCEPS, HOT/COLD, DISP: Performed by: INTERNAL MEDICINE

## 2023-05-30 PROCEDURE — 88305 TISSUE EXAM BY PATHOLOGIST: ICD-10-PCS | Mod: 26,,, | Performed by: PATHOLOGY

## 2023-05-30 PROCEDURE — 88305 TISSUE EXAM BY PATHOLOGIST: CPT | Mod: 26,,, | Performed by: PATHOLOGY

## 2023-05-30 PROCEDURE — 88305 TISSUE EXAM BY PATHOLOGIST: CPT | Performed by: PATHOLOGY

## 2023-05-30 PROCEDURE — 81025 URINE PREGNANCY TEST: CPT | Performed by: INTERNAL MEDICINE

## 2023-05-30 PROCEDURE — 43239 PR EGD, FLEX, W/BIOPSY, SGL/MULTI: ICD-10-PCS | Mod: ,,, | Performed by: INTERNAL MEDICINE

## 2023-05-30 PROCEDURE — 25000003 PHARM REV CODE 250: Performed by: NURSE ANESTHETIST, CERTIFIED REGISTERED

## 2023-05-30 RX ORDER — LIDOCAINE HYDROCHLORIDE 10 MG/ML
INJECTION, SOLUTION EPIDURAL; INFILTRATION; INTRACAUDAL; PERINEURAL
Status: DISCONTINUED | OUTPATIENT
Start: 2023-05-30 | End: 2023-05-30

## 2023-05-30 RX ORDER — PROPOFOL 10 MG/ML
VIAL (ML) INTRAVENOUS
Status: DISCONTINUED | OUTPATIENT
Start: 2023-05-30 | End: 2023-05-30

## 2023-05-30 RX ORDER — ONDANSETRON 2 MG/ML
4 INJECTION INTRAMUSCULAR; INTRAVENOUS ONCE
Status: COMPLETED | OUTPATIENT
Start: 2023-05-30 | End: 2023-05-30

## 2023-05-30 RX ADMIN — ONDANSETRON 4 MG: 2 INJECTION INTRAMUSCULAR; INTRAVENOUS at 01:05

## 2023-05-30 RX ADMIN — ONDANSETRON 4 MG: 2 INJECTION INTRAMUSCULAR; INTRAVENOUS at 02:05

## 2023-05-30 RX ADMIN — PROPOFOL 100 MG: 10 INJECTION, EMULSION INTRAVENOUS at 01:05

## 2023-05-30 RX ADMIN — LIDOCAINE HYDROCHLORIDE 100 MG: 10 SOLUTION INTRAVENOUS at 01:05

## 2023-05-30 RX ADMIN — SODIUM CHLORIDE, POTASSIUM CHLORIDE, SODIUM LACTATE AND CALCIUM CHLORIDE: 600; 310; 30; 20 INJECTION, SOLUTION INTRAVENOUS at 01:05

## 2023-05-30 NOTE — H&P
PRE PROCEDURE H&P    Patient Name: Emeka Miramontes  MRN: [id number]  : 1990  Date of Procedure:  2023  Referring Physician: Desirae Aguilera*  Primary Physician: RAJAT Cox MD  Procedure Physician: Rosario Stock MD       Planned Procedure: EGD  Diagnosis:    Abdominal pain    Chief Complaint: Same as above    HPI: Patient is an 32 y.o. female is here for the above. Referred for epigastric abd pain, N/V and unintentional weight loss since 2023. States she has lost 20 lbs since 2023. No hematemesis. No tobacco or alcohol use. No previous EGD. Has multiple mouth piercings. These need to be removed before proceeding per anesthesia. No NSAID use. Partner Piper at bedside.    2022: 175kbs  2023: 191 lbs    Last colonoscopy: none  Anticoagulation: none    Past Medical History:   Past Medical History:   Diagnosis Date    Anxiety     Asthma     Depression     Hypertension     unsure if she has HTN; started this due to migraine medication that can cause her BP to go up    Migraine with aura and without status migrainosus, not intractable 2023    Migraines         Past Surgical History:  Past Surgical History:   Procedure Laterality Date    KNEE SURGERY Right     ROTATOR CUFF REPAIR          Home Medications:  Prior to Admission medications    Medication Sig Start Date End Date Taking? Authorizing Provider   ALBUTEROL INHL Inhale into the lungs.   Yes Historical Provider   amitriptyline (ELAVIL) 25 MG tablet  3/10/22  Yes Historical Provider   busPIRone (BUSPAR) 15 MG tablet TAKE 1/2 TABLET BY MOUTH TWICE DAILY FOR ANXIETY 2/3/22  Yes Historical Provider   chlorthalidone (HYGROTEN) 25 MG Tab Take 1 tablet (25 mg total) by mouth once daily. 23 Yes Desirae Aguilera PA-C   olmesartan (BENICAR) 40 MG tablet Take 1 tablet (40 mg total) by mouth once daily. 23 Yes Desirae Aguilera PA-C   ondansetron (ZOFRAN) 4 MG tablet  3/10/22  Yes Historical  "Provider   pantoprazole (PROTONIX) 40 MG tablet Take 1 tablet (40 mg total) by mouth every morning. 5/4/23 5/3/24 Yes RAJAT Cox MD   UBROGEPANT 100 mg tablet Take 1 tablet   as needed take one tablet at the onset of headache. Can repeat in 2 hours if needed. Max 200 mg/day 3/10/22  Yes Historical Provider   verapamiL (CALAN) 40 MG Tab verapamil 40 mg tablet   TAKE 1 TABLET BY MOUTH TWICE DAILY 3/16/23  Yes Historical Provider   montelukast (SINGULAIR) 10 mg tablet Take 1 tablet by mouth once daily. 3/15/19   Historical Provider        Allergies:  Review of patient's allergies indicates:   Allergen Reactions    Aspirin Hives        Social History:   Social History     Socioeconomic History    Marital status: Single   Tobacco Use    Smoking status: Never    Smokeless tobacco: Never   Substance and Sexual Activity    Alcohol use: Yes    Drug use: No    Sexual activity: Not Currently       Family History:  Family History   Problem Relation Age of Onset    Hypertension Mother     Breast cancer Mother 68    Ovarian cancer Neg Hx        ROS: No acute cardiac events, no acute respiratory complaints.     Physical Exam (all patients):    /70 (BP Location: Left arm, Patient Position: Lying)   Pulse 78   Temp 98.1 °F (36.7 °C) (Temporal)   Resp 18   Ht 5' 5" (1.651 m)   Wt 86.6 kg (191 lb)   LMP 05/29/2023   SpO2 99%   Breastfeeding No   BMI 31.78 kg/m²   Lungs: Clear to auscultation bilaterally, respirations unlabored  Heart: Regular rate and rhythm, S1 and S2 normal, no obvious murmurs  Abdomen:         Soft, non-tender, bowel sounds normal, no masses, no organomegaly    Lab Results   Component Value Date    WBC 5.66 05/04/2023    MCV 95 05/04/2023    RDW 13.8 05/04/2023     05/04/2023    GLU 92 05/04/2023    HGBA1C 5.7 (H) 05/04/2023    BUN 13 05/04/2023     05/04/2023    K 4.5 05/04/2023     05/04/2023        SEDATION PLAN: per anesthesia      History reviewed, vital signs " satisfactory, cardiopulmonary status satisfactory, sedation options, risks and plans have been discussed with the patient  All their questions were answered and the patient agrees to the sedation procedures as planned and the patient is deemed an appropriate candidate for the sedation as planned.    The risks, benefits and alternatives of the procedure were discussed with the patient in detail. This discussion was had in the presence of her partner Piper and endoscopy staff. The risks include, risks of adverse reaction to sedation requiring the use of reversal agents, bleeding requiring blood transfusion, perforation requiring surgical intervention and technical failure. Other risks include aspiration leading to respiratory distress and respiratory failure resulting in endotracheal intubation and mechanical ventilation including death. If anesthesia is being utilized for this procedure, it is up to the anesthesiologist to determine airway safety including elective endotracheal intubation. Questions were answered, they agree to proceed. There was no language barriers.      Procedure explained to patient, informed consent obtained and placed in chart.    Rosario Stock  5/30/2023  12:58 PM

## 2023-05-30 NOTE — ANESTHESIA PREPROCEDURE EVALUATION
05/30/2023  Emeka Miramontes is a 32 y.o., female.      Pre-op Assessment    I have reviewed the Patient Summary Reports.     I have reviewed the Nursing Notes. I have reviewed the NPO Status.   I have reviewed the Medications.     Review of Systems  Anesthesia Hx:  No problems with previous Anesthesia    Social:  Non-Smoker    Cardiovascular:   Hypertension Sinus bradycardia   Otherwise normal ECG   No previous ECGs available   Confirmed by MOOKIE POLANCO, PIEDAD ([id number]) on 5/4/2023 9:20:25 PM   Pulmonary:   Asthma    Hepatic/GI:   Epigastric pain, N/V   Neurological:   Headaches    Psych:   Psychiatric History anxiety          Physical Exam  General: Well nourished, Cooperative, Alert and Oriented    Airway:  Mallampati: II           Anesthesia Plan  Type of Anesthesia, risks & benefits discussed:    Anesthesia Type: MAC, Gen Natural Airway  Intra-op Monitoring Plan: Standard ASA Monitors  Post Op Pain Control Plan: IV/PO Opioids PRN  Induction:  IV  Informed Consent: Informed consent signed with the Patient and all parties understand the risks and agree with anesthesia plan.  All questions answered.   ASA Score: 2  Day of Surgery Review of History & Physical: H&P Update referred to the surgeon/provider.I have interviewed and examined the patient. I have reviewed the patient's H&P dated: There are no significant changes.     Ready For Surgery From Anesthesia Perspective.     .

## 2023-05-30 NOTE — LETTER
May 30, 2023         [id number] [place] [address]  Phone: [phone]  Fax: [phone]       Patient: Emeka Miramontes   YOB: 1990  Date of Visit: 05/30/2023    To Whom It May Concern:    Tracy Miramontes  was at [facility] on 05/30/2023. The patient may return to work/school on May 31st with no restrictions. If you have any questions or concerns, or if I can be of further assistance, please do not hesitate to contact me.    Sincerely,    Valeriano DAVIS,   Endoscopy  [phone]

## 2023-05-30 NOTE — ANESTHESIA POSTPROCEDURE EVALUATION
Anesthesia Post Evaluation    Patient: Emeka Miramontes    Procedure(s) Performed: Procedure(s) (LRB):  EGD (ESOPHAGOGASTRODUODENOSCOPY) (N/A)    Final Anesthesia Type: MAC      Patient location during evaluation: GI PACU  Patient participation: Yes- Able to Participate  Level of consciousness: awake and alert  Post-procedure vital signs: reviewed and stable  Pain management: adequate  Airway patency: patent    PONV status at discharge: No PONV  Anesthetic complications: no      Cardiovascular status: hemodynamically stable  Respiratory status: unassisted, room air and spontaneous ventilation  Hydration status: euvolemic  Follow-up not needed.          Vitals Value Taken Time   /68 05/30/23 1348   Temp 36.6 °C (97.9 °F) 05/30/23 1318   Pulse 77 05/30/23 1348   Resp 18 05/30/23 1348   SpO2 100 % 05/30/23 1348         Event Time   Out of Recovery 14:31:08         Pain/Latisha Score: Latisha Score: 10 (5/30/2023  2:30 PM)

## 2023-05-30 NOTE — LETTER
May 30, 2023         [address] [place] [address]  Phone: [phone]  Fax: [phone]       Patient: Emeka Miramontes   YOB: 1990  Date of Visit: 05/30/2023    To Whom It May Concern:    Tracy Miramontes  was at [facility] on 05/30/2023. The patient may return to work/school on 06/01/2023 with no restrictions. If you have any questions or concerns, or if I can be of further assistance, please do not hesitate to contact me.    Sincerely,    Ynes Steiner RN

## 2023-05-30 NOTE — TRANSFER OF CARE
"Anesthesia Transfer of Care Note    Patient: Emeka Miramontes    Procedure(s) Performed: Procedure(s) (LRB):  EGD (ESOPHAGOGASTRODUODENOSCOPY) (N/A)    Patient location: GI    Anesthesia Type: MAC    Transport from OR: Transported from OR on room air with adequate spontaneous ventilation    Post pain: adequate analgesia    Post assessment: no apparent anesthetic complications    Post vital signs: stable    Level of consciousness: awake, alert and oriented    Nausea/Vomiting: no nausea/vomiting    Complications: none    Transfer of care protocol was followed      Last vitals:   Visit Vitals  /70 (BP Location: Left arm, Patient Position: Lying)   Pulse 78   Temp 36.7 °C (98.1 °F) (Temporal)   Resp 18   Ht 5' 5" (1.651 m)   Wt 86.6 kg (191 lb)   LMP 05/29/2023   SpO2 99%   Breastfeeding No   BMI 31.78 kg/m²     "

## 2023-05-30 NOTE — PROVATION PATIENT INSTRUCTIONS
Discharge Summary/Instructions after an Endoscopic Procedure  Patient Name: Emeka Miramontes  Patient MRN: [id number]  Patient YOB: 1990  Tuesday, May 30, 2023 Rosario Stock MD  Dear patient,  As a result of recent federal legislation (The Federal Cures Act), you may   receive lab or pathology results from your procedure in your [facility]   account before your physician is able to contact you. Your physician or   their representative will relay the results to you with their   recommendations at their soonest availability.  Thank you,  RESTRICTIONS:  During your procedure today, you received medications for sedation.  These   medications may affect your judgment, balance and coordination.  Therefore,   for 24 hours, you have the following restrictions:   - DO NOT drive a car, operate machinery, make legal/financial decisions,   sign important papers or drink alcohol.    ACTIVITY:  Today: no heavy lifting, straining or running due to procedural   sedation/anesthesia.  The following day: return to full activity including work.  DIET:  Eat and drink normally unless instructed otherwise.     TREATMENT FOR COMMON SIDE EFFECTS:  - Mild abdominal pain, nausea, belching, bloating or excessive gas:  rest,   eat lightly and use a heating pad.  - Sore Throat: treat with throat lozenges and/or gargle with warm salt   water.  - Because air was used during the procedure, expelling large amounts of air   from your rectum or belching is normal.  - If a bowel prep was taken, you may not have a bowel movement for 1-3 days.    This is normal.  SYMPTOMS TO WATCH FOR AND REPORT TO YOUR PHYSICIAN:  1. Abdominal pain or bloating, other than gas cramps.  2. Chest pain.  3. Back pain.  4. Signs of infection such as: chills or fever occurring within 24 hours   after the procedure.  5. Rectal bleeding, which would show as bright red, maroon, or black stools.   (A tablespoon of blood from the rectum is not serious, especially if    hemorrhoids are present.)  6. Vomiting.  7. Weakness or dizziness.  GO DIRECTLY TO THE NEAREST EMERGENCY ROOM IF YOU HAVE ANY OF THE FOLLOWING:      Difficulty breathing              Chills and/or fever over 101 F   Persistent vomiting and/or vomiting blood   Severe abdominal pain   Severe chest pain   Black, tarry stools   Bleeding- more than one tablespoon   Any other symptom or condition that you feel may need urgent attention  Your doctor recommends these additional instructions:  If any biopsies were taken, your doctors clinic will contact you in 1 to 2   weeks with any results.  - Discharge patient to home (with escort).   - Resume previous diet.   - Continue present medications.   - Await pathology results.   - Perform an abdominal ultrasound. This will be scheduled for you according   to your availability. Please call the Radiology department at [phone].     - If symptoms persist recommend GI evaluation.  - Return to primary care physician.  For questions, problems or results please call your physician Rosario Stock MD at Work:  [phone]  If you have any questions about the above instructions, call the GI   department at [phone] or call the endoscopy unit at [phone]   from 7am until 3 pm.  [facility], EMERGENCY ROOM PHONE NUMBER:   [phone]  IF A COMPLICATION OR EMERGENCY SITUATION ARISES AND YOU ARE UNABLE TO REACH   YOUR PHYSICIAN - GO DIRECTLY TO THE EMERGENCY ROOM.  I have read or have had read to me these discharge instructions for my   procedure and have received a written copy.  I understand these   instructions and will follow-up with my physician if I have any questions.     __________________________________       _____________________________________  Nurse Signature                                          Patient/Designated   Responsible Party Signature  MD Rosario Sanchez MD  5/30/2023 1:20:08 PM  This report has been  verified and signed electronically.  Dear patient,  As a result of recent federal legislation (The Federal Cures Act), you may   receive lab or pathology results from your procedure in your [facility]   account before your physician is able to contact you. Your physician or   their representative will relay the results to you with their   recommendations at their soonest availability.  Thank you,  PROVATION

## 2023-05-31 ENCOUNTER — HOSPITAL ENCOUNTER (OUTPATIENT)
Dept: RADIOLOGY | Facility: HOSPITAL | Age: 33
Discharge: HOME | End: 2023-05-31
Attending: INTERNAL MEDICINE
Payer: COMMERCIAL

## 2023-05-31 VITALS
HEIGHT: 65 IN | SYSTOLIC BLOOD PRESSURE: 123 MMHG | WEIGHT: 191 LBS | OXYGEN SATURATION: 100 % | HEART RATE: 77 BPM | DIASTOLIC BLOOD PRESSURE: 68 MMHG | BODY MASS INDEX: 31.82 KG/M2 | TEMPERATURE: 98 F | RESPIRATION RATE: 18 BRPM

## 2023-05-31 DIAGNOSIS — R11.14 BILIOUS VOMITING WITH NAUSEA: ICD-10-CM

## 2023-05-31 DIAGNOSIS — R10.13 EPIGASTRIC PAIN: ICD-10-CM

## 2023-05-31 PROBLEM — R73.03 PREDIABETES: Status: ACTIVE | Noted: 2023-05-31

## 2023-05-31 PROBLEM — R77.9 ELEVATED SERUM PROTEIN LEVEL: Chronic | Status: ACTIVE | Noted: 2023-05-31

## 2023-05-31 PROCEDURE — 76700 US EXAM ABDOM COMPLETE: CPT | Mod: 26,,, | Performed by: STUDENT IN AN ORGANIZED HEALTH CARE EDUCATION/TRAINING PROGRAM

## 2023-05-31 PROCEDURE — 76700 US EXAM ABDOM COMPLETE: CPT | Mod: TC

## 2023-05-31 PROCEDURE — 76700 US ABDOMEN COMPLETE: ICD-10-PCS | Mod: 26,,, | Performed by: STUDENT IN AN ORGANIZED HEALTH CARE EDUCATION/TRAINING PROGRAM

## 2023-06-01 ENCOUNTER — TELEPHONE (OUTPATIENT)
Dept: INTERNAL MEDICINE | Facility: CLINIC | Age: 33
End: 2023-06-01
Payer: COMMERCIAL

## 2023-06-01 DIAGNOSIS — R11.2 NAUSEA AND VOMITING, UNSPECIFIED VOMITING TYPE: Primary | ICD-10-CM

## 2023-06-01 NOTE — TELEPHONE ENCOUNTER
----- Message from Rosario Stock MD sent at 5/31/2023  8:30 PM CDT -----  The ultrasound of your abdomen was normal. We are awaiting your stomach biopsies but recommend your PCP refer you to the GI clinic for further evaluation of your symptoms. Once the referral is placed, our office will call you to schedule your appointment. Please avoid non-steroidal antiinflammatory drugs and other foods or beverages that may cause your symptoms to worsen.

## 2023-06-01 NOTE — PROGRESS NOTES
The ultrasound of your abdomen was normal. We are awaiting your stomach biopsies but recommend your PCP refer you to the GI clinic for further evaluation of your symptoms. Once the referral is placed, our office will call you to schedule your appointment. Please avoid non-steroidal antiinflammatory drugs and other foods or beverages that may cause your symptoms to worsen.

## 2023-06-02 LAB
FINAL PATHOLOGIC DIAGNOSIS: NORMAL
GROSS: NORMAL
Lab: NORMAL

## 2023-06-08 ENCOUNTER — OFFICE VISIT (OUTPATIENT)
Dept: INTERNAL MEDICINE | Facility: CLINIC | Age: 33
End: 2023-06-08
Payer: COMMERCIAL

## 2023-06-08 VITALS
TEMPERATURE: 98 F | BODY MASS INDEX: 31.55 KG/M2 | RESPIRATION RATE: 18 BRPM | OXYGEN SATURATION: 99 % | HEART RATE: 79 BPM | SYSTOLIC BLOOD PRESSURE: 120 MMHG | WEIGHT: 189.63 LBS | DIASTOLIC BLOOD PRESSURE: 70 MMHG

## 2023-06-08 DIAGNOSIS — R10.13 EPIGASTRIC PAIN: Primary | ICD-10-CM

## 2023-06-08 DIAGNOSIS — I10 PRIMARY HYPERTENSION: Chronic | ICD-10-CM

## 2023-06-08 DIAGNOSIS — R10.13 EPIGASTRIC DISCOMFORT: ICD-10-CM

## 2023-06-08 DIAGNOSIS — K92.0 HEMATEMESIS OF UNKNOWN ETIOLOGY: ICD-10-CM

## 2023-06-08 DIAGNOSIS — R63.4 WEIGHT LOSS, UNINTENTIONAL: ICD-10-CM

## 2023-06-08 DIAGNOSIS — R11.14 BILIOUS VOMITING WITH NAUSEA: ICD-10-CM

## 2023-06-08 PROCEDURE — 99999 PR PBB SHADOW E&M-EST. PATIENT-LVL III: CPT | Mod: PBBFAC,,, | Performed by: PHYSICIAN ASSISTANT

## 2023-06-08 PROCEDURE — 99213 PR OFFICE/OUTPT VISIT, EST, LEVL III, 20-29 MIN: ICD-10-PCS | Mod: S$GLB,,, | Performed by: PHYSICIAN ASSISTANT

## 2023-06-08 PROCEDURE — 99999 PR PBB SHADOW E&M-EST. PATIENT-LVL III: ICD-10-PCS | Mod: PBBFAC,,, | Performed by: PHYSICIAN ASSISTANT

## 2023-06-08 PROCEDURE — 99213 OFFICE O/P EST LOW 20 MIN: CPT | Mod: S$GLB,,, | Performed by: PHYSICIAN ASSISTANT

## 2023-06-08 RX ORDER — PANTOPRAZOLE SODIUM 40 MG/1
40 TABLET, DELAYED RELEASE ORAL EVERY MORNING
Qty: 30 TABLET | Refills: 1 | Status: SHIPPED | OUTPATIENT
Start: 2023-06-08 | End: 2023-08-18

## 2023-06-08 NOTE — PROGRESS NOTES
Subjective:      Patient ID: Emeka Miramontes is a 32 y.o. female.    Chief Complaint: Follow-up    HPI  Here today for a follow up for her frequent emesis. Has improved mildly but still having episodes. Less frequent.   EGD and biopsies were negative. Ultrasound of her abd was negative. Has not had blood in her vomit for at least 2 weeks.   No triggers but is worse with eating.     Last visit we increased her olmesartan and her BP is now under control.  Occasionally wakes up in the middle of the night and has to vomit.   Has lost a few lbs.     Does not use marijuana.    Denies any constipation.     Wt Readings from Last 3 Encounters:   06/08/23 1028 86 kg (189 lb 9.5 oz)   05/30/23 1205 86.6 kg (191 lb)   05/25/23 1015 86.9 kg (191 lb 9.3 oz)        Patient Active Problem List   Diagnosis    Dysmenorrhea    Primary hypertension    Migraine with aura and without status migrainosus, not intractable    Epigastric pain    Weight loss, unintentional    Bilious vomiting with nausea    Prediabetes    Elevated serum protein level         Current Outpatient Medications:     ALBUTEROL INHL, Inhale into the lungs., Disp: , Rfl:     amitriptyline (ELAVIL) 25 MG tablet, , Disp: , Rfl:     busPIRone (BUSPAR) 15 MG tablet, TAKE 1/2 TABLET BY MOUTH TWICE DAILY FOR ANXIETY, Disp: , Rfl:     chlorthalidone (HYGROTEN) 25 MG Tab, Take 1 tablet (25 mg total) by mouth once daily., Disp: 90 tablet, Rfl: 3    montelukast (SINGULAIR) 10 mg tablet, Take 1 tablet by mouth once daily., Disp: , Rfl:     olmesartan (BENICAR) 40 MG tablet, Take 1 tablet (40 mg total) by mouth once daily., Disp: 90 tablet, Rfl: 3    ondansetron (ZOFRAN) 4 MG tablet, , Disp: , Rfl:     UBROGEPANT 100 mg tablet, Take 1 tablet   as needed take one tablet at the onset of headache. Can repeat in 2 hours if needed. Max 200 mg/day, Disp: , Rfl:     verapamiL (CALAN) 40 MG Tab, verapamil 40 mg tablet  TAKE 1 TABLET BY MOUTH TWICE DAILY, Disp: , Rfl:      pantoprazole (PROTONIX) 40 MG tablet, Take 1 tablet (40 mg total) by mouth every morning., Disp: 30 tablet, Rfl: 1    Review of Systems   Constitutional:  Positive for unexpected weight change. Negative for activity change and appetite change.   HENT: Negative.  Negative for hearing loss, postnasal drip, rhinorrhea, trouble swallowing and voice change.    Eyes: Negative.  Negative for visual disturbance.   Respiratory: Negative.  Negative for choking, chest tightness and shortness of breath.    Cardiovascular:  Negative for palpitations and leg swelling.   Gastrointestinal:  Positive for abdominal distention. Negative for blood in stool, constipation and diarrhea.   Endocrine: Negative for cold intolerance, heat intolerance, polydipsia and polyuria.   Genitourinary: Negative.  Negative for difficulty urinating and frequency.   Musculoskeletal:  Negative for back pain and gait problem.   Skin:  Negative for color change, pallor and wound.   Neurological:  Negative for dizziness, tremors and light-headedness.   Hematological:  Negative for adenopathy.   Psychiatric/Behavioral:  Negative for behavioral problems, confusion, self-injury, sleep disturbance and suicidal ideas. The patient is not nervous/anxious.    Objective:   /70 (BP Location: Left arm, Patient Position: Sitting, BP Method: Medium (Manual))   Pulse 79   Temp 98 °F (36.7 °C)   Resp 18   Wt 86 kg (189 lb 9.5 oz)   LMP 05/29/2023   SpO2 99%   BMI 31.55 kg/m²     Physical Exam  Vitals reviewed.   Constitutional:       General: She is not in acute distress.     Appearance: Normal appearance. She is well-developed. She is not ill-appearing, toxic-appearing or diaphoretic.   HENT:      Head: Normocephalic and atraumatic.      Right Ear: External ear normal.      Left Ear: External ear normal.      Nose: Nose normal.   Eyes:      Conjunctiva/sclera: Conjunctivae normal.      Pupils: Pupils are equal, round, and reactive to light.   Cardiovascular:       Rate and Rhythm: Normal rate and regular rhythm.      Heart sounds: Normal heart sounds. No murmur heard.    No friction rub. No gallop.   Pulmonary:      Effort: Pulmonary effort is normal. No respiratory distress.      Breath sounds: Normal breath sounds. No wheezing or rales.   Chest:      Chest wall: No tenderness.   Abdominal:      General: There is no distension.      Palpations: Abdomen is soft.      Tenderness: There is no abdominal tenderness.   Musculoskeletal:         General: Normal range of motion.      Cervical back: Normal range of motion and neck supple.   Lymphadenopathy:      Cervical: No cervical adenopathy.   Skin:     General: Skin is warm and dry.      Capillary Refill: Capillary refill takes less than 2 seconds.      Findings: No rash.   Neurological:      Mental Status: She is alert and oriented to person, place, and time.      Motor: No weakness.      Coordination: Coordination normal.      Gait: Gait normal.   Psychiatric:         Mood and Affect: Mood normal.         Behavior: Behavior normal.         Thought Content: Thought content normal.         Judgment: Judgment normal.     US Abdomen Complete  Narrative: EXAMINATION:  US ABDOMEN COMPLETE    CLINICAL HISTORY:  epigastric pain, Neg EGD; Epigastric pain    TECHNIQUE:  Complete abdominal ultrasound (including pancreas, liver, gallbladder, common bile duct, spleen, aorta, IVC, and kidneys) was performed.    COMPARISON:  None    FINDINGS:  Liver: Normal size (15.4 cm) and appearance. Portal vein is patent with proper directional flow.    Biliary: No calculi, wall thickening, or pericholecystic fluid.  No sonographic Garduno's sign. Common duct is normal (5 mm).  No intrahepatic ductal dilatation.    Spleen: Unremarkable.    Pancreas: Unremarkable.    Renal: Unremarkable kidneys.  No hydronephrosis.    Aorta: Visualized portion normal.    IVC: Visualized portion normal.    Miscellaneous: None.  Impression: No significant  abnormality.    Electronically signed by: Mark Frazier  Date:    05/31/2023  Time:    16:13     Assessment:     1. Epigastric pain    2. Weight loss, unintentional    3. Bilious vomiting with nausea    4. Primary hypertension    5. Hematemesis of unknown etiology    6. Epigastric discomfort      Plan:   Epigastric pain    Weight loss, unintentional    Bilious vomiting with nausea    Primary hypertension    Hematemesis of unknown etiology  -     pantoprazole (PROTONIX) 40 MG tablet; Take 1 tablet (40 mg total) by mouth every morning.  Dispense: 30 tablet; Refill: 1    Epigastric discomfort  -     pantoprazole (PROTONIX) 40 MG tablet; Take 1 tablet (40 mg total) by mouth every morning.  Dispense: 30 tablet; Refill: 1    -cont protonix for now and get further recommendation from GI  -follow up with GI. Referral placed. Pt just needs to schedule. Results reviewed. Unclear etiology for her symptoms.   -restart elavil nightly as this might help with cyclic vomiting syndrome.   -bp stable and controlled on current medications.     Follow up if symptoms worsen or fail to improve.

## 2023-06-12 ENCOUNTER — PATIENT MESSAGE (OUTPATIENT)
Dept: INTERNAL MEDICINE | Facility: CLINIC | Age: 33
End: 2023-06-12
Payer: COMMERCIAL

## 2023-06-12 ENCOUNTER — TELEPHONE (OUTPATIENT)
Dept: INTERNAL MEDICINE | Facility: CLINIC | Age: 33
End: 2023-06-12
Payer: COMMERCIAL

## 2023-06-12 DIAGNOSIS — R77.8 ELEVATED TOTAL PROTEIN: Primary | ICD-10-CM

## 2023-06-12 NOTE — TELEPHONE ENCOUNTER
----- Message from RAJAT Cox MD sent at 5/31/2023  7:40 PM CDT -----  He has appointment with you Thursday, June 8th.    Items Needing Follow-Up:  ·Please order repeat serum protein level. If second measurement remains high, order SPEP.  ·Please educate her on pre-diabetes. [facility].    Thanks!   ED / Discharge Outreach Protocol    Patient Contact    Attempt # 2    Was call answered?  No. Has follow up scheduled next week

## 2023-06-15 ENCOUNTER — OFFICE VISIT (OUTPATIENT)
Dept: GASTROENTEROLOGY | Facility: CLINIC | Age: 33
End: 2023-06-15
Payer: COMMERCIAL

## 2023-06-15 VITALS
HEART RATE: 90 BPM | BODY MASS INDEX: 32.14 KG/M2 | OXYGEN SATURATION: 98 % | SYSTOLIC BLOOD PRESSURE: 130 MMHG | WEIGHT: 192.88 LBS | DIASTOLIC BLOOD PRESSURE: 79 MMHG | HEIGHT: 65 IN

## 2023-06-15 DIAGNOSIS — R10.13 EPIGASTRIC PAIN: Primary | ICD-10-CM

## 2023-06-15 DIAGNOSIS — R11.14 BILIOUS VOMITING WITH NAUSEA: ICD-10-CM

## 2023-06-15 DIAGNOSIS — R10.10 UPPER ABDOMINAL PAIN, UNSPECIFIED: ICD-10-CM

## 2023-06-15 PROCEDURE — 99999 PR PBB SHADOW E&M-EST. PATIENT-LVL IV: CPT | Mod: PBBFAC,,, | Performed by: INTERNAL MEDICINE

## 2023-06-15 PROCEDURE — 99204 PR OFFICE/OUTPT VISIT, NEW, LEVL IV, 45-59 MIN: ICD-10-PCS | Mod: S$GLB,,, | Performed by: INTERNAL MEDICINE

## 2023-06-15 PROCEDURE — 99204 OFFICE O/P NEW MOD 45 MIN: CPT | Mod: S$GLB,,, | Performed by: INTERNAL MEDICINE

## 2023-06-15 PROCEDURE — 99999 PR PBB SHADOW E&M-EST. PATIENT-LVL IV: ICD-10-PCS | Mod: PBBFAC,,, | Performed by: INTERNAL MEDICINE

## 2023-06-15 RX ORDER — GALCANEZUMAB 120 MG/ML
1 INJECTION, SOLUTION SUBCUTANEOUS
COMMUNITY

## 2023-06-15 NOTE — PROGRESS NOTES
[facility]  Gastroenterology    Patient evaluated at the request of Desirae Aguilera PA-C  [id number] [place] [address]    PCP: RAJAT Cox MD    6/15/23    HPI       Abdominal Pain     Additional comments: Pt present today with c/o  mid to upper abdominal pain and vomiting blood           Last edited by Toni Milligan, MA on 6/15/2023  2:24 PM.      Reason for Visit: Epigastric Pain, Vomiting    Subjective:   Emeka Miramontes is a 32 y.o. female here for epigastric pain and vomiting. US abdomen normal. EGD done 5/30/23 was unremarkable. Gastric bx negative for H pylori. Started onto Protonix about a month ago. Vomiting episodes and abdominal pain episodes have decreased since then but are still occurring. Episodes are random, not necessarily affiliated with eating. Denies THC use. Not eating as much, has lost weight because of this. Denies constipation, diarrhea or blood in stool.       Past Medical History:   Diagnosis Date    Anxiety     Asthma     Depression     Hypertension     unsure if she has HTN; started this due to migraine medication that can cause her BP to go up    Migraine with aura and without status migrainosus, not intractable 5/4/2023    Migraines        Past Surgical History:   Procedure Laterality Date    ESOPHAGOGASTRODUODENOSCOPY N/A 5/30/2023    Procedure: EGD (ESOPHAGOGASTRODUODENOSCOPY);  Surgeon: Rosario Stock MD;  Location: [facility];  Service: Endoscopy;  Laterality: N/A;    KNEE SURGERY Right     ROTATOR CUFF REPAIR         Current Outpatient Medications on File Prior to Visit   Medication Sig Dispense Refill    ALBUTEROL INHL Inhale into the lungs.      amitriptyline (ELAVIL) 25 MG tablet       busPIRone (BUSPAR) 15 MG tablet TAKE 1/2 TABLET BY MOUTH TWICE DAILY FOR ANXIETY      chlorthalidone (HYGROTEN) 25 MG Tab Take 1 tablet (25 mg total) by mouth once daily. 90 tablet 3    galcanezumab-gnlm (EMGALITY PEN) 120 mg/mL PnIj 1 mL.       montelukast (SINGULAIR) 10 mg tablet Take 1 tablet by mouth once daily.      olmesartan (BENICAR) 40 MG tablet Take 1 tablet (40 mg total) by mouth once daily. 90 tablet 3    ondansetron (ZOFRAN) 4 MG tablet       pantoprazole (PROTONIX) 40 MG tablet Take 1 tablet (40 mg total) by mouth every morning. 30 tablet 1    UBROGEPANT 100 mg tablet Take 1 tablet   as needed take one tablet at the onset of headache. Can repeat in 2 hours if needed. Max 200 mg/day      verapamiL (CALAN) 40 MG Tab verapamil 40 mg tablet   TAKE 1 TABLET BY MOUTH TWICE DAILY       No current facility-administered medications on file prior to visit.       Review of patient's allergies indicates:   Allergen Reactions    Aspirin Hives       Social History     Socioeconomic History    Marital status: Single   Tobacco Use    Smoking status: Never    Smokeless tobacco: Never   Substance and Sexual Activity    Alcohol use: Yes    Drug use: No    Sexual activity: Not Currently       Family History   Problem Relation Age of Onset    Hypertension Mother     Breast cancer Mother 68    Ovarian cancer Neg Hx        Review of Systems   Constitutional:  Positive for appetite change and unexpected weight change. Negative for fever.   HENT:  Negative for postnasal drip, rhinorrhea, sneezing, sore throat and trouble swallowing.    Eyes:  Negative for visual disturbance.   Respiratory:  Negative for cough, shortness of breath and wheezing.    Cardiovascular:  Negative for chest pain, palpitations and leg swelling.   Gastrointestinal:  Positive for abdominal pain, nausea and vomiting. Negative for blood in stool, constipation and diarrhea.   Genitourinary:  Negative for dysuria.   Musculoskeletal:  Negative for arthralgias, joint swelling and myalgias.   Skin:  Negative for color change, pallor and rash.   Neurological:  Negative for weakness, light-headedness, numbness and headaches.   Hematological:  Negative for adenopathy. Does not bruise/bleed easily.    Psychiatric/Behavioral:  Negative for agitation.          Objective:   Vitals:   Vitals:    06/15/23 1424   BP: 130/79   Pulse: 90       Physical Exam  Vitals reviewed.   Constitutional:       General: She is not in acute distress.     Appearance: She is not diaphoretic.   HENT:      Head: Normocephalic and atraumatic.      Mouth/Throat:      Pharynx: No oropharyngeal exudate.   Eyes:      General: No scleral icterus.        Right eye: No discharge.         Left eye: No discharge.      Conjunctiva/sclera: Conjunctivae normal.      Pupils: Pupils are equal, round, and reactive to light.   Cardiovascular:      Rate and Rhythm: Normal rate and regular rhythm.      Heart sounds: Normal heart sounds. No murmur heard.    No friction rub. No gallop.   Pulmonary:      Effort: Pulmonary effort is normal. No respiratory distress.      Breath sounds: Normal breath sounds. No stridor. No wheezing or rales.   Abdominal:      General: Bowel sounds are normal. There is no distension.      Palpations: Abdomen is soft. There is no mass.      Tenderness: There is no abdominal tenderness. There is no guarding.   Musculoskeletal:         General: Normal range of motion.      Cervical back: Normal range of motion.   Skin:     General: Skin is warm and dry.      Coloration: Skin is not pale.      Findings: No erythema or rash.   Neurological:      Mental Status: She is alert and oriented to person, place, and time.       US Abdomen Complete    Result Date: 5/31/2023  EXAMINATION: US ABDOMEN COMPLETE CLINICAL HISTORY: epigastric pain, Neg EGD; Epigastric pain TECHNIQUE: Complete abdominal ultrasound (including pancreas, liver, gallbladder, common bile duct, spleen, aorta, IVC, and kidneys) was performed. COMPARISON: None FINDINGS: Liver: Normal size (15.4 cm) and appearance. Portal vein is patent with proper directional flow. Biliary: No calculi, wall thickening, or pericholecystic fluid.  No sonographic Garduno's sign. Common duct is  normal (5 mm).  No intrahepatic ductal dilatation. Spleen: Unremarkable. Pancreas: Unremarkable. Renal: Unremarkable kidneys.  No hydronephrosis. Aorta: Visualized portion normal. IVC: Visualized portion normal. Miscellaneous: None.     No significant abnormality. Electronically signed by: Mark Frazier Date:    05/31/2023 Time:    16:13      IMPRESSION     Problem List Items Addressed This Visit          GI    Epigastric pain - Primary    Relevant Orders    Lipase    Bilious vomiting with nausea    Relevant Orders    Lipase     Other Visit Diagnoses       Upper abdominal pain, unspecified        Relevant Orders    NM Hepatobiliary Scan (HIDA)            PLANS:    - May be acid related, as episodes have decreased since starting PPI. Continue for another 4 weeks  - EGD unremarkable  - US abdomen unremarkable  - Check lipase  - Check HIDA scan  - If the above all negative/unrevealing, can consider NM GE study as patient was recently diagnosed as a pre-diabetic    Epigastric pain  -     Lipase; Future; Expected date: 06/15/2023  -     Cancel: NM Gastric Emptying; Future; Expected date: 06/15/2023    Bilious vomiting with nausea  -     Lipase; Future; Expected date: 06/15/2023  -     Cancel: NM Gastric Emptying; Future; Expected date: 06/15/2023    Upper abdominal pain, unspecified  -     NM Hepatobiliary Scan (HIDA); Future; Expected date: 06/15/2023        Elza Lam MD  Gastroenterology

## 2023-06-22 ENCOUNTER — HOSPITAL ENCOUNTER (OUTPATIENT)
Dept: RADIOLOGY | Facility: HOSPITAL | Age: 33
Discharge: HOME | End: 2023-06-22
Attending: INTERNAL MEDICINE
Payer: COMMERCIAL

## 2023-06-22 DIAGNOSIS — R10.10 UPPER ABDOMINAL PAIN, UNSPECIFIED: ICD-10-CM

## 2023-06-22 PROCEDURE — 78227 HEPATOBIL SYST IMAGE W/DRUG: CPT | Mod: 26,,, | Performed by: RADIOLOGY

## 2023-06-22 PROCEDURE — 78227 HEPATOBIL SYST IMAGE W/DRUG: CPT | Mod: TC

## 2023-06-22 PROCEDURE — 78227 NM HEPATOBILIARY(HIDA) WITH PHARM AND EF WHEN PERFORMED: ICD-10-PCS | Mod: 26,,, | Performed by: RADIOLOGY

## 2023-06-22 PROCEDURE — 63600175 PHARM REV CODE 636 W HCPCS

## 2023-06-22 RX ORDER — SINCALIDE 5 UG/5ML
0.02 INJECTION, POWDER, LYOPHILIZED, FOR SOLUTION INTRAVENOUS ONCE
Status: COMPLETED | OUTPATIENT
Start: 2023-06-22 | End: 2023-06-22

## 2023-06-22 RX ADMIN — SINCALIDE: 5 INJECTION, POWDER, LYOPHILIZED, FOR SOLUTION INTRAVENOUS at 04:06

## 2023-06-26 DIAGNOSIS — R94.8 ABNORMAL BILIARY HIDA SCAN: Primary | ICD-10-CM

## 2023-07-13 ENCOUNTER — OFFICE VISIT (OUTPATIENT)
Dept: SURGERY | Facility: CLINIC | Age: 33
End: 2023-07-13
Payer: COMMERCIAL

## 2023-07-13 ENCOUNTER — LAB VISIT (OUTPATIENT)
Dept: LAB | Facility: HOSPITAL | Age: 33
End: 2023-07-13
Attending: SURGERY
Payer: COMMERCIAL

## 2023-07-13 VITALS
TEMPERATURE: 98 F | SYSTOLIC BLOOD PRESSURE: 123 MMHG | DIASTOLIC BLOOD PRESSURE: 74 MMHG | WEIGHT: 188.25 LBS | HEART RATE: 93 BPM | BODY MASS INDEX: 31.36 KG/M2 | HEIGHT: 65 IN

## 2023-07-13 DIAGNOSIS — K82.8 BILIARY DYSKINESIA: ICD-10-CM

## 2023-07-13 DIAGNOSIS — R94.8 ABNORMAL BILIARY HIDA SCAN: ICD-10-CM

## 2023-07-13 DIAGNOSIS — K82.8 BILIARY DYSKINESIA: Primary | ICD-10-CM

## 2023-07-13 LAB
ALBUMIN SERPL BCP-MCNC: 4.6 G/DL (ref 3.5–5.2)
ALP SERPL-CCNC: 53 U/L (ref 55–135)
ALT SERPL W/O P-5'-P-CCNC: 27 U/L (ref 10–44)
ANION GAP SERPL CALC-SCNC: 11 MMOL/L (ref 8–16)
AST SERPL-CCNC: 34 U/L (ref 10–40)
BASOPHILS # BLD AUTO: 0.03 K/UL (ref 0–0.2)
BASOPHILS NFR BLD: 0.5 % (ref 0–1.9)
BILIRUB SERPL-MCNC: 0.2 MG/DL (ref 0.1–1)
BUN SERPL-MCNC: 18 MG/DL (ref 6–20)
CALCIUM SERPL-MCNC: 10 MG/DL (ref 8.7–10.5)
CHLORIDE SERPL-SCNC: 99 MMOL/L (ref 95–110)
CO2 SERPL-SCNC: 26 MMOL/L (ref 23–29)
CREAT SERPL-MCNC: 1.2 MG/DL (ref 0.5–1.4)
DIFFERENTIAL METHOD: ABNORMAL
EOSINOPHIL # BLD AUTO: 0.1 K/UL (ref 0–0.5)
EOSINOPHIL NFR BLD: 1.2 % (ref 0–8)
ERYTHROCYTE [DISTWIDTH] IN BLOOD BY AUTOMATED COUNT: 14.1 % (ref 11.5–14.5)
EST. GFR  (NO RACE VARIABLE): >60 ML/MIN/1.73 M^2
GLUCOSE SERPL-MCNC: 101 MG/DL (ref 70–110)
HCT VFR BLD AUTO: 42 % (ref 37–48.5)
HGB BLD-MCNC: 13.3 G/DL (ref 12–16)
IMM GRANULOCYTES # BLD AUTO: 0.01 K/UL (ref 0–0.04)
IMM GRANULOCYTES NFR BLD AUTO: 0.2 % (ref 0–0.5)
LYMPHOCYTES # BLD AUTO: 2 K/UL (ref 1–4.8)
LYMPHOCYTES NFR BLD: 30.4 % (ref 18–48)
MCH RBC QN AUTO: 30.6 PG (ref 27–31)
MCHC RBC AUTO-ENTMCNC: 31.7 G/DL (ref 32–36)
MCV RBC AUTO: 97 FL (ref 82–98)
MONOCYTES # BLD AUTO: 0.8 K/UL (ref 0.3–1)
MONOCYTES NFR BLD: 11.3 % (ref 4–15)
NEUTROPHILS # BLD AUTO: 3.7 K/UL (ref 1.8–7.7)
NEUTROPHILS NFR BLD: 56.4 % (ref 38–73)
NRBC BLD-RTO: 0 /100 WBC
PLATELET # BLD AUTO: 268 K/UL (ref 150–450)
PMV BLD AUTO: 11.8 FL (ref 9.2–12.9)
POTASSIUM SERPL-SCNC: 4.1 MMOL/L (ref 3.5–5.1)
PROT SERPL-MCNC: 9.1 G/DL (ref 6–8.4)
RBC # BLD AUTO: 4.35 M/UL (ref 4–5.4)
SODIUM SERPL-SCNC: 136 MMOL/L (ref 136–145)
WBC # BLD AUTO: 6.61 K/UL (ref 3.9–12.7)

## 2023-07-13 PROCEDURE — 99204 OFFICE O/P NEW MOD 45 MIN: CPT | Mod: S$GLB,,, | Performed by: SURGERY

## 2023-07-13 PROCEDURE — 36415 COLL VENOUS BLD VENIPUNCTURE: CPT | Performed by: SURGERY

## 2023-07-13 PROCEDURE — 99999 PR PBB SHADOW E&M-EST. PATIENT-LVL V: CPT | Mod: PBBFAC,,, | Performed by: SURGERY

## 2023-07-13 PROCEDURE — 80053 COMPREHEN METABOLIC PANEL: CPT | Performed by: SURGERY

## 2023-07-13 PROCEDURE — 85025 COMPLETE CBC W/AUTO DIFF WBC: CPT | Performed by: SURGERY

## 2023-07-13 PROCEDURE — 99204 PR OFFICE/OUTPT VISIT, NEW, LEVL IV, 45-59 MIN: ICD-10-PCS | Mod: S$GLB,,, | Performed by: SURGERY

## 2023-07-13 PROCEDURE — 99999 PR PBB SHADOW E&M-EST. PATIENT-LVL V: ICD-10-PCS | Mod: PBBFAC,,, | Performed by: SURGERY

## 2023-07-13 RX ORDER — INDOCYANINE GREEN AND WATER 25 MG
2.5 KIT INJECTION
Status: CANCELLED | OUTPATIENT
Start: 2023-07-13

## 2023-07-13 RX ORDER — LIDOCAINE HYDROCHLORIDE 10 MG/ML
1 INJECTION, SOLUTION EPIDURAL; INFILTRATION; INTRACAUDAL; PERINEURAL ONCE
Status: DISCONTINUED | OUTPATIENT
Start: 2023-07-13 | End: 2023-07-26 | Stop reason: HOSPADM

## 2023-07-13 RX ORDER — ONDANSETRON 4 MG/1
8 TABLET, ORALLY DISINTEGRATING ORAL EVERY 8 HOURS PRN
Status: CANCELLED | OUTPATIENT
Start: 2023-07-13

## 2023-07-13 NOTE — PROGRESS NOTES
Patient ID: Emeka Miramontes is a 33 y.o. female.    Chief Complaint: Consult (gallbladder)      HPI:  33-year-old female who presents for evaluation of abdominal discomfort and nausea associated with a dysfunctional gallbladder.      Patient states that she gets some abdominal pain.  The pain tends to be located in the right upper quadrant.  She also has episodes of nausea and vomiting.      Initially the episodes of nausea and vomiting were frequently but they have become less.      Patient was evaluated by Gastroenterology.  A gallbladder ultrasound showed no stones.  A HIDA scan showed an ejection fraction of .      The patient stated that the injection of CCK did cause some nausea and some increase in discomfort.      She presents now to discuss cholecystectomy      Review of Systems   Constitutional:  Positive for unexpected weight change. Negative for activity change.   HENT:  Negative for hearing loss, rhinorrhea and trouble swallowing.    Eyes:  Negative for discharge and visual disturbance.   Respiratory:  Negative for chest tightness and wheezing.    Cardiovascular:  Positive for palpitations. Negative for chest pain.   Gastrointestinal:  Positive for diarrhea and vomiting. Negative for blood in stool and constipation.   Endocrine: Negative for polydipsia and polyuria.   Genitourinary:  Negative for difficulty urinating, dysuria, hematuria and menstrual problem.   Musculoskeletal:  Negative for arthralgias, joint swelling and neck pain.   Neurological:  Negative for weakness and headaches.   Psychiatric/Behavioral:  Negative for confusion and dysphoric mood.      Current Outpatient Medications   Medication Sig Dispense Refill    ALBUTEROL INHL Inhale into the lungs.      amitriptyline (ELAVIL) 25 MG tablet       busPIRone (BUSPAR) 15 MG tablet TAKE 1/2 TABLET BY MOUTH TWICE DAILY FOR ANXIETY      chlorthalidone (HYGROTEN) 25 MG Tab Take 1 tablet (25 mg total) by mouth once daily. 90 tablet 3     galcanezumab-gnlm (EMGALITY PEN) 120 mg/mL PnIj 1 mL.      montelukast (SINGULAIR) 10 mg tablet Take 1 tablet by mouth once daily.      olmesartan (BENICAR) 40 MG tablet Take 1 tablet (40 mg total) by mouth once daily. 90 tablet 3    ondansetron (ZOFRAN) 4 MG tablet       pantoprazole (PROTONIX) 40 MG tablet Take 1 tablet (40 mg total) by mouth every morning. 30 tablet 1    UBROGEPANT 100 mg tablet Take 1 tablet   as needed take one tablet at the onset of headache. Can repeat in 2 hours if needed. Max 200 mg/day      verapamiL (CALAN) 40 MG Tab verapamil 40 mg tablet   TAKE 1 TABLET BY MOUTH TWICE DAILY       Current Facility-Administered Medications   Medication Dose Route Frequency Provider Last Rate Last Admin    LIDOcaine (PF) 10 mg/ml (1%) injection 10 mg  1 mL Intradermal Once Moise Ojeda MD           Review of patient's allergies indicates:   Allergen Reactions    Aspirin Hives       Past Medical History:   Diagnosis Date    Anxiety     Asthma     Depression     Hypertension     unsure if she has HTN; started this due to migraine medication that can cause her BP to go up    Migraine with aura and without status migrainosus, not intractable 5/4/2023    Migraines        Past Surgical History:   Procedure Laterality Date    ESOPHAGOGASTRODUODENOSCOPY N/A 5/30/2023    Procedure: EGD (ESOPHAGOGASTRODUODENOSCOPY);  Surgeon: Rosario Stock MD;  Location: [facility];  Service: Endoscopy;  Laterality: N/A;    KNEE SURGERY Right     ROTATOR CUFF REPAIR         Family History   Problem Relation Age of Onset    Hypertension Mother     Breast cancer Mother 68    Ovarian cancer Neg Hx        Social History     Socioeconomic History    Marital status: Single   Tobacco Use    Smoking status: Never    Smokeless tobacco: Never   Substance and Sexual Activity    Alcohol use: Yes    Drug use: No    Sexual activity: Not Currently       Vitals:    07/13/23 1016   BP: 123/74   Pulse: 93   Temp: 98.4 °F (36.9 °C)        Physical Exam  Constitutional:       Appearance: She is well-developed.   HENT:      Head: Normocephalic.   Eyes:      Pupils: Pupils are equal, round, and reactive to light.   Neck:      Thyroid: No thyromegaly.      Vascular: No JVD.      Trachea: No tracheal deviation.   Cardiovascular:      Rate and Rhythm: Normal rate and regular rhythm.      Heart sounds: Normal heart sounds.   Pulmonary:      Breath sounds: Normal breath sounds. No wheezing.   Abdominal:      General: Abdomen is flat. Bowel sounds are normal. There is no distension.      Palpations: Abdomen is soft. Abdomen is not rigid. There is no mass.      Tenderness: There is no abdominal tenderness (mild right upper quadrant). There is no guarding or rebound.   Musculoskeletal:         General: Normal range of motion.   Lymphadenopathy:      Cervical: No cervical adenopathy.   Skin:     General: Skin is warm and dry.      Findings: No erythema or rash.      Comments: Multiple tattoos   Neurological:      Mental Status: She is oriented to person, place, and time.     GI notes reviewed   Ultrasound reviewed  HIDA scan reviewed    Assessment & Plan:      Biliary dyskinesia with the majority of the symptoms being nausea.      Patient would benefit from a robotic cholecystectomy.  The need for surgery risks benefits and complications were discussed.  The possibility that the symptoms would persist or recur was discussed.      Questions answered.      Surgery scheduled for July 26th.      CBC CMP preoperative  The risks, benefits and complications of robotic/laparoscopic cholecystectomy were discussed.  These included infection, bleeding, abscess, retained stone and bile leak.  The need for open conversion was dicussed.  The rare possibility of a common bile duct injury and the need for additional procedures and/or surgery was reviewed.  All question were answered.  The patient has agreed to proceed.  Consent was obtained.     I Explained to the patient  with biliary dyskinesia removal of the gallbladder may result in resolution of symptoms, no change in his symptoms, or recurrence of the symptoms after several months of being symptom-free.  This is due to the fact that this is a functional disorder of the gallbladder.

## 2023-07-13 NOTE — PATIENT INSTRUCTIONS
Your gallbladder surgery scheduled for July 26th.      The hospital call you with the time of arrival.      No solid food after midnight on July 25th but you may have clear liquids up to 3 hours before your arrival time.      If any of your labs are abnormal we will let you know    Our office phone numbers are  [phone] and

## 2023-07-13 NOTE — H&P (VIEW-ONLY)
Patient ID: Emeka Miramontes is a 33 y.o. female.    Chief Complaint: Consult (gallbladder)      HPI:  33-year-old female who presents for evaluation of abdominal discomfort and nausea associated with a dysfunctional gallbladder.      Patient states that she gets some abdominal pain.  The pain tends to be located in the right upper quadrant.  She also has episodes of nausea and vomiting.      Initially the episodes of nausea and vomiting were frequently but they have become less.      Patient was evaluated by Gastroenterology.  A gallbladder ultrasound showed no stones.  A HIDA scan showed an ejection fraction of .      The patient stated that the injection of CCK did cause some nausea and some increase in discomfort.      She presents now to discuss cholecystectomy      Review of Systems   Constitutional:  Positive for unexpected weight change. Negative for activity change.   HENT:  Negative for hearing loss, rhinorrhea and trouble swallowing.    Eyes:  Negative for discharge and visual disturbance.   Respiratory:  Negative for chest tightness and wheezing.    Cardiovascular:  Positive for palpitations. Negative for chest pain.   Gastrointestinal:  Positive for diarrhea and vomiting. Negative for blood in stool and constipation.   Endocrine: Negative for polydipsia and polyuria.   Genitourinary:  Negative for difficulty urinating, dysuria, hematuria and menstrual problem.   Musculoskeletal:  Negative for arthralgias, joint swelling and neck pain.   Neurological:  Negative for weakness and headaches.   Psychiatric/Behavioral:  Negative for confusion and dysphoric mood.      Current Outpatient Medications   Medication Sig Dispense Refill    ALBUTEROL INHL Inhale into the lungs.      amitriptyline (ELAVIL) 25 MG tablet       busPIRone (BUSPAR) 15 MG tablet TAKE 1/2 TABLET BY MOUTH TWICE DAILY FOR ANXIETY      chlorthalidone (HYGROTEN) 25 MG Tab Take 1 tablet (25 mg total) by mouth once daily. 90 tablet 3     galcanezumab-gnlm (EMGALITY PEN) 120 mg/mL PnIj 1 mL.      montelukast (SINGULAIR) 10 mg tablet Take 1 tablet by mouth once daily.      olmesartan (BENICAR) 40 MG tablet Take 1 tablet (40 mg total) by mouth once daily. 90 tablet 3    ondansetron (ZOFRAN) 4 MG tablet       pantoprazole (PROTONIX) 40 MG tablet Take 1 tablet (40 mg total) by mouth every morning. 30 tablet 1    UBROGEPANT 100 mg tablet Take 1 tablet   as needed take one tablet at the onset of headache. Can repeat in 2 hours if needed. Max 200 mg/day      verapamiL (CALAN) 40 MG Tab verapamil 40 mg tablet   TAKE 1 TABLET BY MOUTH TWICE DAILY       Current Facility-Administered Medications   Medication Dose Route Frequency Provider Last Rate Last Admin    LIDOcaine (PF) 10 mg/ml (1%) injection 10 mg  1 mL Intradermal Once Moise Ojeda MD           Review of patient's allergies indicates:   Allergen Reactions    Aspirin Hives       Past Medical History:   Diagnosis Date    Anxiety     Asthma     Depression     Hypertension     unsure if she has HTN; started this due to migraine medication that can cause her BP to go up    Migraine with aura and without status migrainosus, not intractable 5/4/2023    Migraines        Past Surgical History:   Procedure Laterality Date    ESOPHAGOGASTRODUODENOSCOPY N/A 5/30/2023    Procedure: EGD (ESOPHAGOGASTRODUODENOSCOPY);  Surgeon: Rosario Stock MD;  Location: [facility];  Service: Endoscopy;  Laterality: N/A;    KNEE SURGERY Right     ROTATOR CUFF REPAIR         Family History   Problem Relation Age of Onset    Hypertension Mother     Breast cancer Mother 68    Ovarian cancer Neg Hx        Social History     Socioeconomic History    Marital status: Single   Tobacco Use    Smoking status: Never    Smokeless tobacco: Never   Substance and Sexual Activity    Alcohol use: Yes    Drug use: No    Sexual activity: Not Currently       Vitals:    07/13/23 1016   BP: 123/74   Pulse: 93   Temp: 98.4 °F (36.9 °C)        Physical Exam  Constitutional:       Appearance: She is well-developed.   HENT:      Head: Normocephalic.   Eyes:      Pupils: Pupils are equal, round, and reactive to light.   Neck:      Thyroid: No thyromegaly.      Vascular: No JVD.      Trachea: No tracheal deviation.   Cardiovascular:      Rate and Rhythm: Normal rate and regular rhythm.      Heart sounds: Normal heart sounds.   Pulmonary:      Breath sounds: Normal breath sounds. No wheezing.   Abdominal:      General: Abdomen is flat. Bowel sounds are normal. There is no distension.      Palpations: Abdomen is soft. Abdomen is not rigid. There is no mass.      Tenderness: There is no abdominal tenderness (mild right upper quadrant). There is no guarding or rebound.   Musculoskeletal:         General: Normal range of motion.   Lymphadenopathy:      Cervical: No cervical adenopathy.   Skin:     General: Skin is warm and dry.      Findings: No erythema or rash.      Comments: Multiple tattoos   Neurological:      Mental Status: She is oriented to person, place, and time.     GI notes reviewed   Ultrasound reviewed  HIDA scan reviewed    Assessment & Plan:      Biliary dyskinesia with the majority of the symptoms being nausea.      Patient would benefit from a robotic cholecystectomy.  The need for surgery risks benefits and complications were discussed.  The possibility that the symptoms would persist or recur was discussed.      Questions answered.      Surgery scheduled for July 26th.      CBC CMP preoperative  The risks, benefits and complications of robotic/laparoscopic cholecystectomy were discussed.  These included infection, bleeding, abscess, retained stone and bile leak.  The need for open conversion was dicussed.  The rare possibility of a common bile duct injury and the need for additional procedures and/or surgery was reviewed.  All question were answered.  The patient has agreed to proceed.  Consent was obtained.     I Explained to the patient  with biliary dyskinesia removal of the gallbladder may result in resolution of symptoms, no change in his symptoms, or recurrence of the symptoms after several months of being symptom-free.  This is due to the fact that this is a functional disorder of the gallbladder.

## 2023-07-18 ENCOUNTER — TELEPHONE (OUTPATIENT)
Dept: PREADMISSION TESTING | Facility: HOSPITAL | Age: 33
End: 2023-07-18
Payer: COMMERCIAL

## 2023-07-18 NOTE — TELEPHONE ENCOUNTER
Pre op instructions reviewed with pt ,verbalized understanding.    To confirm, Surgery is scheduled on 7/26/23. We will call you late afternoon the business day prior to surgery with your arrival time.    *Please report to the [facility] Lobby (1st Floor) located off of [facility] (2nd Entrance/Building on the left, in front of the flag pole).  Address: [address] [place] [address] [place]. [address]      INSTRUCTIONS IMPORTANT!!!  Do Not Eat, Drink, or Smoke after 12 midnight unless instructed otherwise by your Surgeon. OK to brush teeth, no gum, candy or mints!      *Take Only these medications with a small sip of water Morning of Surgery:  -inhaler  -pantoprazole  -verapamil      ____  HOLD all vitamins, herbal supplements, aspirin products & NSAIDS 7 days prior to surgery, as these can thin the blood.  ____  Avoid Alcoholic beverages 3 days prior to surgery, as it can thin the blood.  ____  NO Acrylic/fake nails or nail polish worn day of surgery (specifically hand/arm & foot surgeries).  ____  NO powder, lotions, deodorants, oils or cream on body.  ____  Remove all jewelry & piercings prior to surgery.  ____  Remove Dentures, Hearing Aids & Contact Lens prior to surgery.  ____  Bring photo ID and insurance information to hospital (Leave Valuables at Home).  ____  If going home the same day, arrange for a ride home. You will not be able to drive for 24 hrs if Anesthesia was used.   ____  Females (ages 11-60): may need to give a urine sample the morning of surgery; please see Pre op Nurse prior to using the restroom.  ____  Males: Stop ED medications (Viagra, Cialis) 24 hrs prior to surgery.  ____  Wear clean, loose fitting clothing to allow for dressings/ bandages.            Diabetic Patients: If you take diabetic medication, do NOT take morning of surgery unless instructed by Doctor. Metformin to be stopped 24 hrs prior to surgery time. DO NOT take long-acting insulin the evening before surgery. Blood  sugars will be checked in pre-op by Nurse.    Bathing Instructions:    -Shower with anti-bacterial Soap (Hibiclens or Dial) the night before surgery and the morning of.   -Do not use Hibiclens on your face or genitals.   -Apply clean clothes after shower.  -Do not shave your face or body 2 days prior to surgery unless instructed otherwise by your Surgeon.  -Do not shave pubic hair 7 days prior to surgery (gyn pt's).    [facility]/Ride Policy:  Only 2 adults allowed in pre op/recovery area during your procedure. You MUST HAVE A RIDE HOME from a responsible adult that you know and trust. Medical Transport, Uber or Lyft can ONLY be used if patient has a responsible adult to accompany them during ride home.    Discharge Instructions: You will receive Post-op/Discharge instructions by your Discharge Nurse prior to going home.   *Prevention of surgical site infections:   -Keep incisions clean and dry.   -Do not soak/submerge incisions in water until completely healed.   -Do not apply lotions, powders, creams, or deodorants to site.   -Always make sure hands are cleaned with antibacterial soap/ alcohol-based  prior to touching the surgical site.        *Signs and symptoms:               -Redness and pain around the area where you had surgery               -Drainage of cloudy fluid from your surgical wound               -Fever over 100.4 or chills      >>>Call Surgeon office/on-call Surgeon if you experience any of these signs & symptoms post-surgery @ [phone].       *If you are running late or have questions the morning of surgery, please call the [facility] Surgery Dept @ [phone].    *Payment questions:  [phone]     *Billing questions:  [phone]       Thank you,  -[facility] Pre [facility]mit Dept.  [phone] or [phone]  M-F 7:30 am-4:00 pm (Closed Major Holidays)

## 2023-07-25 ENCOUNTER — ANESTHESIA EVENT (OUTPATIENT)
Dept: SURGERY | Facility: HOSPITAL | Age: 33
End: 2023-07-25
Payer: COMMERCIAL

## 2023-07-25 ENCOUNTER — TELEPHONE (OUTPATIENT)
Dept: PREADMISSION TESTING | Facility: HOSPITAL | Age: 33
End: 2023-07-25
Payer: COMMERCIAL

## 2023-07-25 NOTE — TELEPHONE ENCOUNTER
Spoke with Pt about the following:     Please arrive to [facility] ([facility] at 5:30 am on 7/26/23 for your scheduled procedure.  Address: [address] [place] [address] [place]. [address] (2nd Building on left, 1st Floor Lobby)  >>>NO eating or drinking after midnight unless instructed otherwise by your Surgeon<<<

## 2023-07-25 NOTE — ANESTHESIA PREPROCEDURE EVALUATION
07/25/2023  Emeka Miramontes is a 33 y.o., female     Patient Active Problem List   Diagnosis    Dysmenorrhea    Primary hypertension    Migraine with aura and without status migrainosus, not intractable    Epigastric pain    Weight loss, unintentional    Bilious vomiting with nausea    Prediabetes    Elevated serum protein level     Past Medical History:   Diagnosis Date    Anxiety     Asthma     Depression     Hypertension     unsure if she has HTN; started this due to migraine medication that can cause her BP to go up    Migraine with aura and without status migrainosus, not intractable 5/4/2023    Migraines      Past Surgical History:   Procedure Laterality Date    ESOPHAGOGASTRODUODENOSCOPY N/A 5/30/2023    Procedure: EGD (ESOPHAGOGASTRODUODENOSCOPY);  Surgeon: Rosario Stock MD;  Location: [facility];  Service: Endoscopy;  Laterality: N/A;    KNEE SURGERY Right     ROTATOR CUFF REPAIR         Chemistry        Component Value Date/Time     07/13/2023 1115    K 4.1 07/13/2023 1115    CL 99 07/13/2023 1115    CO2 26 07/13/2023 1115    BUN 18 07/13/2023 1115    CREATININE 1.2 07/13/2023 1115     07/13/2023 1115        Component Value Date/Time    CALCIUM 10.0 07/13/2023 1115    ALKPHOS 53 (L) 07/13/2023 1115    AST 34 07/13/2023 1115    ALT 27 07/13/2023 1115    BILITOT 0.2 07/13/2023 1115        Lab Results   Component Value Date    WBC 6.61 07/13/2023    HGB 13.3 07/13/2023    HCT 42.0 07/13/2023    MCV 97 07/13/2023     07/13/2023       Pre-op Assessment    I have reviewed the Patient Summary Reports.    I have reviewed the NPO Status.   I have reviewed the Medications.     Review of Systems  Anesthesia Hx:  No problems with previous Anesthesia  History of prior surgery of interest to airway management or planning: Previous anesthesia: MAC, General  Denies  "Personal Hx of Anesthesia complications.   Social:  Non-Smoker    Hematology/Oncology:  Hematology Normal   Oncology Normal     Cardiovascular:   Hypertension ECG has been reviewed. Sinus bradycardia   Otherwise normal ECG   No previous ECGs available   Confirmed by MOOKIE POLANCO, PIEDAD ([id number]) on 5/4/2023 9:20:25 PM    Pulmonary:   Asthma    Renal/:  Renal/ Normal     Musculoskeletal:  Musculoskeletal Normal    Neurological:   Headaches    Endocrine:  Endocrine Normal BMI 31 Obesity / BMI > 30  Psych:   depression          Physical Exam  General: Well nourished, Cooperative, Alert and Oriented    Airway:  Mallampati: III   Mouth Opening: Small, but > 3cm  TM Distance: Normal  Tongue: Normal  Neck ROM: Normal ROM    Dental:  Intact  Patient denies any currently loose or chipped teeth; Patient denies any removable dental appliances      Anesthesia Plan  Type of Anesthesia, risks & benefits discussed:    Anesthesia Type: Gen ETT  Intra-op Monitoring Plan: Standard ASA Monitors  Post Op Pain Control Plan: multimodal analgesia and IV/PO Opioids PRN  Induction:  IV  Airway Plan: Direct, Post-Induction  Informed Consent: Informed consent signed with the Patient and all parties understand the risks and agree with anesthesia plan.  All questions answered.   ASA Score: 2  Day of Surgery Review of History & Physical: H&P Update referred to the surgeon/provider.  Anesthesia Plan Notes: Patient with several facial and tongue piercings - tongue piercings and contacts removed prior to procedure, but patient wishes to keep Right ear studs and nose ring in place for procedure as they are "tough to remove"; risk of loss, damage, inhalation, etc. Explained to patient and patient voiced understanding     Ready For Surgery From Anesthesia Perspective.     .      "

## 2023-07-25 NOTE — TELEPHONE ENCOUNTER
Called and LVM  the following:     Please arrive to [facility] ([facility] at 5:30 am on 7/26/23 for your scheduled procedure.  Address: [address] [place] [address] [place]. [address] (2nd Building on left, 1st Floor Lobby)  >>>NO eating or drinking after midnight unless instructed otherwise by your Surgeon<<<

## 2023-07-26 ENCOUNTER — ANESTHESIA (OUTPATIENT)
Dept: SURGERY | Facility: HOSPITAL | Age: 33
End: 2023-07-26
Payer: COMMERCIAL

## 2023-07-26 ENCOUNTER — HOSPITAL ENCOUNTER (OUTPATIENT)
Facility: HOSPITAL | Age: 33
Discharge: HOME | End: 2023-07-26
Attending: SURGERY | Admitting: SURGERY
Payer: COMMERCIAL

## 2023-07-26 DIAGNOSIS — K82.8 BILIARY DYSKINESIA: ICD-10-CM

## 2023-07-26 LAB
B-HCG UR QL: NEGATIVE
CTP QC/QA: YES

## 2023-07-26 PROCEDURE — 27201423 OPTIME MED/SURG SUP & DEVICES STERILE SUPPLY: Performed by: SURGERY

## 2023-07-26 PROCEDURE — 71000033 HC RECOVERY, INTIAL HOUR: Performed by: SURGERY

## 2023-07-26 PROCEDURE — 81025 URINE PREGNANCY TEST: CPT | Performed by: SURGERY

## 2023-07-26 PROCEDURE — 37000009 HC ANESTHESIA EA ADD 15 MINS: Performed by: SURGERY

## 2023-07-26 PROCEDURE — 47562 LAPAROSCOPIC CHOLECYSTECTOMY: CPT | Mod: ,,, | Performed by: SURGERY

## 2023-07-26 PROCEDURE — 47562 PR LAP,CHOLECYSTECTOMY: ICD-10-PCS | Mod: ,,, | Performed by: SURGERY

## 2023-07-26 PROCEDURE — 71000015 HC POSTOP RECOV 1ST HR: Performed by: SURGERY

## 2023-07-26 PROCEDURE — 88304 PR  SURG PATH,LEVEL III: ICD-10-PCS | Mod: 26,,, | Performed by: PATHOLOGY

## 2023-07-26 PROCEDURE — 25000003 PHARM REV CODE 250: Performed by: STUDENT IN AN ORGANIZED HEALTH CARE EDUCATION/TRAINING PROGRAM

## 2023-07-26 PROCEDURE — 36000710: Performed by: SURGERY

## 2023-07-26 PROCEDURE — 88304 TISSUE EXAM BY PATHOLOGIST: CPT | Performed by: PATHOLOGY

## 2023-07-26 PROCEDURE — 37000008 HC ANESTHESIA 1ST 15 MINUTES: Performed by: SURGERY

## 2023-07-26 PROCEDURE — 25000003 PHARM REV CODE 250: Performed by: SURGERY

## 2023-07-26 PROCEDURE — 63600175 PHARM REV CODE 636 W HCPCS: Performed by: SURGERY

## 2023-07-26 PROCEDURE — 36000711: Performed by: SURGERY

## 2023-07-26 PROCEDURE — 63600175 PHARM REV CODE 636 W HCPCS: Performed by: STUDENT IN AN ORGANIZED HEALTH CARE EDUCATION/TRAINING PROGRAM

## 2023-07-26 PROCEDURE — 71000039 HC RECOVERY, EACH ADD'L HOUR: Performed by: SURGERY

## 2023-07-26 PROCEDURE — 88304 TISSUE EXAM BY PATHOLOGIST: CPT | Mod: 26,,, | Performed by: PATHOLOGY

## 2023-07-26 RX ORDER — OXYCODONE AND ACETAMINOPHEN 5; 325 MG/1; MG/1
1 TABLET ORAL
Status: DISCONTINUED | OUTPATIENT
Start: 2023-07-26 | End: 2023-07-26 | Stop reason: HOSPADM

## 2023-07-26 RX ORDER — PROPOFOL 10 MG/ML
VIAL (ML) INTRAVENOUS
Status: DISCONTINUED | OUTPATIENT
Start: 2023-07-26 | End: 2023-07-26

## 2023-07-26 RX ORDER — HYDROMORPHONE HYDROCHLORIDE 2 MG/ML
0.2 INJECTION, SOLUTION INTRAMUSCULAR; INTRAVENOUS; SUBCUTANEOUS EVERY 5 MIN PRN
Status: DISCONTINUED | OUTPATIENT
Start: 2023-07-26 | End: 2023-07-26 | Stop reason: HOSPADM

## 2023-07-26 RX ORDER — HYDROMORPHONE HYDROCHLORIDE 2 MG/ML
1 INJECTION, SOLUTION INTRAMUSCULAR; INTRAVENOUS; SUBCUTANEOUS EVERY 4 HOURS PRN
Status: CANCELLED | OUTPATIENT
Start: 2023-07-26

## 2023-07-26 RX ORDER — OXYCODONE AND ACETAMINOPHEN 5; 325 MG/1; MG/1
1 TABLET ORAL
Status: DISCONTINUED | OUTPATIENT
Start: 2023-07-26 | End: 2023-07-26 | Stop reason: SDUPTHER

## 2023-07-26 RX ORDER — ONDANSETRON 2 MG/ML
4 INJECTION INTRAMUSCULAR; INTRAVENOUS DAILY PRN
Status: DISCONTINUED | OUTPATIENT
Start: 2023-07-26 | End: 2023-07-26 | Stop reason: SDUPTHER

## 2023-07-26 RX ORDER — HYDROMORPHONE HYDROCHLORIDE 2 MG/ML
0.2 INJECTION, SOLUTION INTRAMUSCULAR; INTRAVENOUS; SUBCUTANEOUS EVERY 5 MIN PRN
Status: DISCONTINUED | OUTPATIENT
Start: 2023-07-26 | End: 2023-07-26 | Stop reason: SDUPTHER

## 2023-07-26 RX ORDER — HYDROCODONE BITARTRATE AND ACETAMINOPHEN 7.5; 325 MG/1; MG/1
1 TABLET ORAL EVERY 6 HOURS PRN
Status: DISCONTINUED | OUTPATIENT
Start: 2023-07-26 | End: 2023-07-26 | Stop reason: HOSPADM

## 2023-07-26 RX ORDER — CEFAZOLIN SODIUM 2 G/50ML
2 SOLUTION INTRAVENOUS
Status: COMPLETED | OUTPATIENT
Start: 2023-07-26 | End: 2023-07-26

## 2023-07-26 RX ORDER — FENTANYL CITRATE 50 UG/ML
INJECTION, SOLUTION INTRAMUSCULAR; INTRAVENOUS
Status: DISCONTINUED | OUTPATIENT
Start: 2023-07-26 | End: 2023-07-26

## 2023-07-26 RX ORDER — DIPHENHYDRAMINE HYDROCHLORIDE 50 MG/ML
12.5 INJECTION INTRAMUSCULAR; INTRAVENOUS
Status: DISCONTINUED | OUTPATIENT
Start: 2023-07-26 | End: 2023-07-26 | Stop reason: HOSPADM

## 2023-07-26 RX ORDER — ONDANSETRON 8 MG/1
8 TABLET, ORALLY DISINTEGRATING ORAL EVERY 8 HOURS PRN
Status: DISCONTINUED | OUTPATIENT
Start: 2023-07-26 | End: 2023-07-26 | Stop reason: HOSPADM

## 2023-07-26 RX ORDER — PROCHLORPERAZINE EDISYLATE 5 MG/ML
5 INJECTION INTRAMUSCULAR; INTRAVENOUS EVERY 6 HOURS PRN
Status: DISCONTINUED | OUTPATIENT
Start: 2023-07-26 | End: 2023-07-26 | Stop reason: HOSPADM

## 2023-07-26 RX ORDER — DEXAMETHASONE SODIUM PHOSPHATE 4 MG/ML
INJECTION, SOLUTION INTRA-ARTICULAR; INTRALESIONAL; INTRAMUSCULAR; INTRAVENOUS; SOFT TISSUE
Status: DISCONTINUED | OUTPATIENT
Start: 2023-07-26 | End: 2023-07-26

## 2023-07-26 RX ORDER — ONDANSETRON 2 MG/ML
INJECTION INTRAMUSCULAR; INTRAVENOUS
Status: DISCONTINUED | OUTPATIENT
Start: 2023-07-26 | End: 2023-07-26

## 2023-07-26 RX ORDER — ONDANSETRON 2 MG/ML
4 INJECTION INTRAMUSCULAR; INTRAVENOUS DAILY PRN
Status: DISCONTINUED | OUTPATIENT
Start: 2023-07-26 | End: 2023-07-26 | Stop reason: HOSPADM

## 2023-07-26 RX ORDER — HYDROCODONE BITARTRATE AND ACETAMINOPHEN 5; 325 MG/1; MG/1
1 TABLET ORAL EVERY 4 HOURS PRN
Status: DISCONTINUED | OUTPATIENT
Start: 2023-07-26 | End: 2023-07-26 | Stop reason: HOSPADM

## 2023-07-26 RX ORDER — DIPHENHYDRAMINE HYDROCHLORIDE 50 MG/ML
12.5 INJECTION INTRAMUSCULAR; INTRAVENOUS
Status: DISCONTINUED | OUTPATIENT
Start: 2023-07-26 | End: 2023-07-26 | Stop reason: SDUPTHER

## 2023-07-26 RX ORDER — INDOCYANINE GREEN AND WATER 25 MG
2.5 KIT INJECTION
Status: COMPLETED | OUTPATIENT
Start: 2023-07-26 | End: 2023-07-26

## 2023-07-26 RX ORDER — HYDROCODONE BITARTRATE AND ACETAMINOPHEN 5; 325 MG/1; MG/1
1 TABLET ORAL EVERY 6 HOURS PRN
Qty: 20 TABLET | Refills: 0 | Status: SHIPPED | OUTPATIENT
Start: 2023-07-26 | End: 2023-08-16

## 2023-07-26 RX ORDER — BUPIVACAINE HYDROCHLORIDE 2.5 MG/ML
INJECTION, SOLUTION EPIDURAL; INFILTRATION; INTRACAUDAL
Status: DISCONTINUED | OUTPATIENT
Start: 2023-07-26 | End: 2023-07-26 | Stop reason: HOSPADM

## 2023-07-26 RX ORDER — ONDANSETRON 8 MG/1
8 TABLET, ORALLY DISINTEGRATING ORAL EVERY 8 HOURS PRN
Status: CANCELLED | OUTPATIENT
Start: 2023-07-26

## 2023-07-26 RX ORDER — SUCCINYLCHOLINE CHLORIDE 20 MG/ML
INJECTION INTRAMUSCULAR; INTRAVENOUS
Status: DISCONTINUED | OUTPATIENT
Start: 2023-07-26 | End: 2023-07-26

## 2023-07-26 RX ORDER — LIDOCAINE HYDROCHLORIDE 20 MG/ML
INJECTION INTRAVENOUS
Status: DISCONTINUED | OUTPATIENT
Start: 2023-07-26 | End: 2023-07-26

## 2023-07-26 RX ORDER — KETAMINE HCL IN 0.9 % NACL 50 MG/5 ML
SYRINGE (ML) INTRAVENOUS
Status: DISCONTINUED | OUTPATIENT
Start: 2023-07-26 | End: 2023-07-26

## 2023-07-26 RX ORDER — MIDAZOLAM HYDROCHLORIDE 1 MG/ML
INJECTION INTRAMUSCULAR; INTRAVENOUS
Status: DISCONTINUED | OUTPATIENT
Start: 2023-07-26 | End: 2023-07-26

## 2023-07-26 RX ORDER — ROCURONIUM BROMIDE 10 MG/ML
INJECTION, SOLUTION INTRAVENOUS
Status: DISCONTINUED | OUTPATIENT
Start: 2023-07-26 | End: 2023-07-26

## 2023-07-26 RX ADMIN — ONDANSETRON 4 MG: 2 INJECTION INTRAMUSCULAR; INTRAVENOUS at 07:07

## 2023-07-26 RX ADMIN — PROPOFOL 150 MG: 10 INJECTION, EMULSION INTRAVENOUS at 07:07

## 2023-07-26 RX ADMIN — ROCURONIUM BROMIDE 25 MG: 10 SOLUTION INTRAVENOUS at 07:07

## 2023-07-26 RX ADMIN — SODIUM CHLORIDE, SODIUM LACTATE, POTASSIUM CHLORIDE, AND CALCIUM CHLORIDE: .6; .31; .03; .02 INJECTION, SOLUTION INTRAVENOUS at 07:07

## 2023-07-26 RX ADMIN — SODIUM CHLORIDE, SODIUM LACTATE, POTASSIUM CHLORIDE, AND CALCIUM CHLORIDE: .6; .31; .03; .02 INJECTION, SOLUTION INTRAVENOUS at 08:07

## 2023-07-26 RX ADMIN — DEXAMETHASONE SODIUM PHOSPHATE 8 MG: 4 INJECTION, SOLUTION INTRA-ARTICULAR; INTRALESIONAL; INTRAMUSCULAR; INTRAVENOUS; SOFT TISSUE at 07:07

## 2023-07-26 RX ADMIN — INDOCYANINE GREEN AND WATER 2.5 MG: KIT at 07:07

## 2023-07-26 RX ADMIN — LIDOCAINE HYDROCHLORIDE 100 MG: 20 INJECTION INTRAVENOUS at 07:07

## 2023-07-26 RX ADMIN — HYDROMORPHONE HYDROCHLORIDE 0.2 MG: 2 INJECTION INTRAMUSCULAR; INTRAVENOUS; SUBCUTANEOUS at 09:07

## 2023-07-26 RX ADMIN — SUCCINYLCHOLINE CHLORIDE 140 MG: 20 INJECTION, SOLUTION INTRAMUSCULAR; INTRAVENOUS; PARENTERAL at 07:07

## 2023-07-26 RX ADMIN — ROCURONIUM BROMIDE 5 MG: 10 SOLUTION INTRAVENOUS at 07:07

## 2023-07-26 RX ADMIN — MIDAZOLAM HYDROCHLORIDE 2 MG: 1 INJECTION, SOLUTION INTRAMUSCULAR; INTRAVENOUS at 07:07

## 2023-07-26 RX ADMIN — CEFAZOLIN SODIUM 2 G: 2 SOLUTION INTRAVENOUS at 07:07

## 2023-07-26 RX ADMIN — ROCURONIUM BROMIDE 20 MG: 10 SOLUTION INTRAVENOUS at 07:07

## 2023-07-26 RX ADMIN — SUGAMMADEX 200 MG: 100 INJECTION, SOLUTION INTRAVENOUS at 08:07

## 2023-07-26 RX ADMIN — PROCHLORPERAZINE EDISYLATE 5 MG: 5 INJECTION INTRAMUSCULAR; INTRAVENOUS at 09:07

## 2023-07-26 RX ADMIN — FENTANYL CITRATE 100 MCG: 50 INJECTION, SOLUTION INTRAMUSCULAR; INTRAVENOUS at 07:07

## 2023-07-26 RX ADMIN — Medication 25 MG: at 07:07

## 2023-07-26 NOTE — OP NOTE
[facility] - Surgery (Hospital)  Operative Note      Date of Procedure: 7/26/2023     Procedure: Procedure(s) (LRB):  XI ROBOTIC CHOLECYSTECTOMY (N/A)     Surgeon(s) and Role:     * Moise Ojeda MD - Primary    Assisting Surgeon: None    Pre-Operative Diagnosis: Biliary dyskinesia [K82.8]    Post-Operative Diagnosis: Post-Op Diagnosis Codes:     * Biliary dyskinesia [K82.8]    Anesthesia: General    Operative Findings (including complications, if any):     Chronically inflamed appearing gallbladder without stones    Description of Technical Procedures:     The patient was brought into the operating room and placed on the operating table in the supine position.  General endotracheal anesthesia was induced.  IV antibiotics were administered.  Pneumatic compression devices were placed in the lower extremities.  Arms were tucked at the side.  The abdomen was clipped, prepped and draped in the standard manner.   Indocyanine green was administered    A timeout was performed.    A small transverse incision was made just above the umbilicus.  The fascia was identified and elevated with Allen clamps.  A varies needle was inserted and its position confirmed by saline drop test.  Pneumoperitoneum was established to 15 mmHg.  A 8 mm robotic trocar was inserted.  The laparoscope was inserted.  There was no evidence of a vascular or enteric injury.    Additional trochars were placed as follows an 8 mm robotic trocar was placed in the midclavicular line in the left midabdomen.  An 8 mm trocar was placed in the midclavicular line of the right midabdomen.  An 8 mm trocar was placed in the anterior axillary line of the right midabdomen.  The patient was placed in reverse Trendelenburg position and rolled to the left.  The patient was docked to the da Anika robot.    The fundus of the gallbladder was retracted cephalad.  The infundibulum was retracted laterally.  The fire fly technology of the robot was activated in the course of the  common bile duct was elucidated.    The cystic duct was also visualized and dye could be seen entering the gallbladder    Peritoneum over the neck of the gallbladder was taken down with the hook cautery and the cystic duct was dissected circumferentially.  The cystic artery was then dissected circumferentially and the neck of the gallbladder was then dissected off the gallbladder bed.  This cleared Timothy triangle of all loose areolar tissue and the critical view was obtained.    The cystic duct was clipped with 2 clips proximally 1 clip distally and transected.  The cystic artery was clipped with 2 clips proximally 1 clip distally and transected.  The robotic scissors with cauteryy was then used to remove the gallbladder from the gallbladder bed.     The gallbladder was nearly excised from the gallbladder bed, the gallbladder bed was inspected and hemostasis was ensured using electrocautery.  Firefly technology was engaged was no evidence of a bile leak from the liver bed or the cystic duct stump.  Removal of the gallbladder was then completed.    The right lateral operating arm of the robot was then removed and an Endo Catch bag was inserted.  The gallbladder was placed in Endo Catch bag.  The patient was then undocked from the da Anika operating robot.    The gallbladder was extracted through the right lateral incision.  Gallbladder removal was accomplished with gentle traction.  The trochars were removed under direct vision and no bleeding was noted.  The abdomen was then desufflated.  Marcaine was infiltrated.  All incisions were closed with 4-0 Monocryl in a subcuticular manner.  Dermabond was placed    Significant Surgical Tasks Conducted by the Assistant(s), if Applicable:  None    Estimated Blood Loss (EBL): 10 mL  IV fluids 1 L   Marcaine 0.25% 30 mL           Implants: * No implants in log *    Specimens:   Specimen (24h ago, onward)       Start     Ordered    07/26/23 5204  Specimen to Pathology, Surgery  General Surgery  Once        Comments: Pre-op Diagnosis: Biliary dyskinesia [K82.8]Procedure(s):XI ROBOTIC CHOLECYSTECTOMY Number of specimens: 1Name of specimens: Gallbladder -perm     References:    Click here for ordering Quick Tip   Question Answer Comment   Procedure Type: General Surgery    Specimen Class: Routine/Screening    Which provider would you like to cc? JUNITO MIRELES    Release to patient Immediate        07/26/23 0756                            Condition: Stable    Disposition: PACU - hemodynamically stable.    Attestation: I performed the procedure.    Discharge Note    OUTCOME: Patient tolerated treatment/procedure well without complication and is now ready for discharge.    Robotic cholecystectomy    DISPOSITION: Home or Self Care    FINAL DIAGNOSIS:  Chronic cholecystitis in the setting of biliary dyskinesia    FOLLOWUP: In clinic    DISCHARGE INSTRUCTIONS:    Discharge Procedure Orders   Diet general     Call MD for:  temperature >100.4     Call MD for:  persistent nausea and vomiting     Call MD for:  severe uncontrolled pain     Call MD for:  difficulty breathing, headache or visual disturbances     Call MD for:  redness, tenderness, or signs of infection (pain, swelling, redness, odor or green/yellow discharge around incision site)     Lifting restrictions   Order Comments: No lifting more than 30 pounds for 2 weeks     No dressing needed

## 2023-07-26 NOTE — PATIENT INSTRUCTIONS
Please call for any fever, increase in pain, nausea or vomiting or redness or drainage from incision(s).    No lifting more than 30 pounds for 2 weeks    No driving if taking the pain medicine    May shower     Removed the glue when it becomes loose    If you become constipated from the pain medication you can use over the counter laxatives,  Miralax or Glycolax, or milk of magnesia for severe constipation.    Our office phone numbers are  [phone] and     Our office fax  number is #[phone]

## 2023-07-26 NOTE — ANESTHESIA PROCEDURE NOTES
Intubation    Date/Time: 7/26/2023 7:10 AM  Performed by: Shiraz Woods CRNA  Authorized by: Brayan Rios MD     Intubation:     Induction:  Intravenous    Intubated:  Postinduction    Mask Ventilation:  Easy mask    Attempts:  1    Attempted By:  Student and CRNA    Method of Intubation:  Direct    Blade:  Jose L 3    Laryngeal View Grade: Grade I - full view of cords      Difficult Airway Encountered?: No      Complications:  None    Airway Device:  Oral endotracheal tube    Airway Device Size:  7.0    Style/Cuff Inflation:  Cuffed (inflated to minimal occlusive pressure)    Tube secured:  21    Secured at:  The lips    Placement Verified By:  Capnometry    Complicating Factors:  None    Findings Post-Intubation:  BS equal bilateral and atraumatic/condition of teeth unchanged

## 2023-07-26 NOTE — TRANSFER OF CARE
"Anesthesia Transfer of Care Note    Patient: Emeka Miramontes    Procedure(s) Performed: Procedure(s) (LRB):  XI ROBOTIC CHOLECYSTECTOMY (N/A)    Patient location: PACU    Anesthesia Type: general    Transport from OR: Transported from OR on room air with adequate spontaneous ventilation    Post pain: adequate analgesia    Post assessment: no apparent anesthetic complications and tolerated procedure well    Post vital signs: stable    Level of consciousness: awake, responds to stimulation and alert    Nausea/Vomiting: no nausea/vomiting    Complications: none    Transfer of care protocol was followedComments: Report given to PACU RN at bedside. RN given opportunity to ask questions or clarify concerns. No Concerns verbalized. RN was asked if ready to assume care of patient. RN verbally confirmed. Pt. left in stable condition. SV. Vital Signs Return to Near Baseline. No s/s of distress noted.       Last vitals:   Visit Vitals  /67 (BP Location: Right arm, Patient Position: Sitting)   Pulse 82   Temp 36.6 °C (97.9 °F) (Temporal)   Resp 16   Ht 5' 5" (1.651 m)   Wt 87.2 kg (192 lb 3.9 oz)   LMP 06/26/2023   SpO2 100%   Breastfeeding No   BMI 31.99 kg/m²     "

## 2023-07-27 VITALS
TEMPERATURE: 99 F | BODY MASS INDEX: 32.03 KG/M2 | HEART RATE: 91 BPM | RESPIRATION RATE: 17 BRPM | HEIGHT: 65 IN | DIASTOLIC BLOOD PRESSURE: 56 MMHG | SYSTOLIC BLOOD PRESSURE: 130 MMHG | OXYGEN SATURATION: 100 % | WEIGHT: 192.25 LBS

## 2023-07-27 NOTE — ANESTHESIA POSTPROCEDURE EVALUATION
Anesthesia Post Evaluation    Patient: Emeka Miramontes    Procedure(s) Performed: Procedure(s) (LRB):  XI ROBOTIC CHOLECYSTECTOMY (N/A)    Final Anesthesia Type: general      Patient location during evaluation: PACU  Patient participation: Yes- Able to Participate  Level of consciousness: awake and alert and oriented  Post-procedure vital signs: reviewed and stable  Pain management: adequate  Airway patency: patent  ANOOP mitigation strategies: Verification of full reversal of neuromuscular block  PONV status at discharge: No PONV  Anesthetic complications: no      Cardiovascular status: blood pressure returned to baseline and hemodynamically stable  Respiratory status: unassisted  Hydration status: euvolemic  Follow-up not needed.          Vitals Value Taken Time   /56 07/26/23 0941   Temp 37.2 °C (98.9 °F) 07/26/23 0826   Pulse 94 07/26/23 0941   Resp 20 07/26/23 0941   SpO2 100 % 07/26/23 0941   Vitals shown include unvalidated device data.      Event Time   Out of Recovery 09:43:27         Pain/Latisha Score: Pain Rating Prior to Med Admin: 10 (7/26/2023  9:25 AM)  Latisha Score: 8 (7/26/2023  9:15 AM)

## 2023-08-01 LAB
FINAL PATHOLOGIC DIAGNOSIS: NORMAL
GROSS: NORMAL
Lab: NORMAL

## 2023-08-09 DIAGNOSIS — R10.13 EPIGASTRIC DISCOMFORT: ICD-10-CM

## 2023-08-09 DIAGNOSIS — K92.0 HEMATEMESIS OF UNKNOWN ETIOLOGY: ICD-10-CM

## 2023-08-09 NOTE — TELEPHONE ENCOUNTER
No care due was identified.  [facility] Embedded Care Due Messages. Reference number: [id number].   8/09/2023 2:11:46 PM CDT

## 2023-08-10 NOTE — TELEPHONE ENCOUNTER
Refill Routing Note   Medication(s) are not appropriate for processing by [facility] for the following reason(s):      New or recently adjusted medication  Responsible provider unclear    [facility] action(s):  Defer Care Due:  None identified            Appointments  past 12m or future 3m with PCP    Date Provider   Last Visit   5/4/2023 RAJAT Cox MD   Next Visit   11/30/2023 RAJAT Cox MD   ED visits in past 90 days: 0        Note composed:8:28 AM 08/10/2023

## 2023-08-16 ENCOUNTER — OFFICE VISIT (OUTPATIENT)
Dept: SURGERY | Facility: CLINIC | Age: 33
End: 2023-08-16
Payer: COMMERCIAL

## 2023-08-16 VITALS
SYSTOLIC BLOOD PRESSURE: 124 MMHG | DIASTOLIC BLOOD PRESSURE: 77 MMHG | BODY MASS INDEX: 32.61 KG/M2 | WEIGHT: 196 LBS | HEART RATE: 73 BPM

## 2023-08-16 DIAGNOSIS — K82.8 BILIARY DYSKINESIA: Primary | ICD-10-CM

## 2023-08-16 PROBLEM — R11.14 BILIOUS VOMITING WITH NAUSEA: Status: RESOLVED | Noted: 2023-05-30 | Resolved: 2023-08-16

## 2023-08-16 PROBLEM — R10.13 EPIGASTRIC PAIN: Status: RESOLVED | Noted: 2023-05-30 | Resolved: 2023-08-16

## 2023-08-16 PROCEDURE — 99024 PR POST-OP FOLLOW-UP VISIT: ICD-10-PCS | Mod: S$GLB,,,

## 2023-08-16 PROCEDURE — 99999 PR PBB SHADOW E&M-EST. PATIENT-LVL III: CPT | Mod: PBBFAC,,,

## 2023-08-16 PROCEDURE — 99999 PR PBB SHADOW E&M-EST. PATIENT-LVL III: ICD-10-PCS | Mod: PBBFAC,,,

## 2023-08-16 PROCEDURE — 99024 POSTOP FOLLOW-UP VISIT: CPT | Mod: S$GLB,,,

## 2023-08-16 NOTE — PROGRESS NOTES
Patient ID: Emeka Miramontes is a 33 y.o. female.    Chief Complaint: Post-op Evaluation (Gallbladder )      HPI:  33-year-old female who presents for evaluation of abdominal discomfort and nausea associated with a dysfunctional gallbladder.      Patient states that she gets some abdominal pain.  The pain tends to be located in the right upper quadrant.  She also has episodes of nausea and vomiting.      Initially the episodes of nausea and vomiting were frequently but they have become less.      Patient was evaluated by Gastroenterology.  A gallbladder ultrasound showed no stones.  A HIDA scan showed an ejection fraction of .      The patient stated that the injection of CCK did cause some nausea and some increase in discomfort.      She presents now to discuss cholecystectomy      Interval History:  Last clinic visit, the patient underwent robotic cholecystectomy.  She has had some episodes of sharp chest tingling pain below the umbilical incision and radiating to her right.  This pain is distinctly different from her preoperative symptoms.  She denies any associated fevers, chills, nausea, and vomiting.  She is tolerating a regular diet and having normal bowel movements.      Review of Systems   Constitutional:  Negative for chills and fever.   Respiratory:  Negative for shortness of breath.    Cardiovascular:  Negative for chest pain.   Gastrointestinal:  Negative for abdominal distention, abdominal pain, constipation, diarrhea, nausea and vomiting.       Current Outpatient Medications   Medication Sig Dispense Refill    ALBUTEROL INHL Inhale into the lungs.      amitriptyline (ELAVIL) 25 MG tablet every evening.      busPIRone (BUSPAR) 15 MG tablet TAKE 1/2 TABLET BY MOUTH TWICE DAILY FOR ANXIETY      chlorthalidone (HYGROTEN) 25 MG Tab Take 1 tablet (25 mg total) by mouth once daily. 90 tablet 3    galcanezumab-gnlm (EMGALITY PEN) 120 mg/mL PnIj 1 mL.      HYDROcodone-acetaminophen (NORCO) 5-325 mg per  tablet Take 1 tablet by mouth every 6 (six) hours as needed for Pain. 20 tablet 0    montelukast (SINGULAIR) 10 mg tablet Take 1 tablet by mouth once daily.      olmesartan (BENICAR) 40 MG tablet Take 1 tablet (40 mg total) by mouth once daily. 90 tablet 3    ondansetron (ZOFRAN) 4 MG tablet       pantoprazole (PROTONIX) 40 MG tablet Take 1 tablet (40 mg total) by mouth every morning. 30 tablet 1    UBROGEPANT 100 mg tablet Take 1 tablet   as needed take one tablet at the onset of headache. Can repeat in 2 hours if needed. Max 200 mg/day      verapamiL (CALAN) 40 MG Tab verapamil 40 mg tablet   TAKE 1 TABLET BY MOUTH TWICE DAILY       No current facility-administered medications for this visit.       Review of patient's allergies indicates:   Allergen Reactions    Aspirin Hives       Past Medical History:   Diagnosis Date    Anxiety     Asthma     Depression     Hypertension     unsure if she has HTN; started this due to migraine medication that can cause her BP to go up    Migraine with aura and without status migrainosus, not intractable 5/4/2023    Migraines        Past Surgical History:   Procedure Laterality Date    ESOPHAGOGASTRODUODENOSCOPY N/A 5/30/2023    Procedure: EGD (ESOPHAGOGASTRODUODENOSCOPY);  Surgeon: Rosario tSock MD;  Location: [facility] ENDO;  Service: Endoscopy;  Laterality: N/A;    KNEE SURGERY Right     ROBOT-ASSISTED CHOLECYSTECTOMY USING DA PATRICK XI N/A 7/26/2023    Procedure: XI ROBOTIC CHOLECYSTECTOMY;  Surgeon: Moise Ojeda MD;  Location: [facility] OR;  Service: General;  Laterality: N/A;    ROTATOR CUFF REPAIR         Family History   Problem Relation Age of Onset    Hypertension Mother     Breast cancer Mother 68    Ovarian cancer Neg Hx        Social History     Socioeconomic History    Marital status: Single   Tobacco Use    Smoking status: Never    Smokeless tobacco: Never   Substance and Sexual Activity    Alcohol use: Yes    Drug use: No    Sexual activity: Not Currently        Vitals:    08/16/23 0910   BP: 124/77   Pulse: 73       Physical Exam  Vitals reviewed.   Constitutional:       General: She is not in acute distress.     Appearance: She is well-developed.   HENT:      Head: Normocephalic and atraumatic.      Right Ear: External ear normal.      Left Ear: External ear normal.      Nose: Nose normal.      Mouth/Throat:      Mouth: Mucous membranes are moist.   Eyes:      Extraocular Movements: Extraocular movements intact.      Conjunctiva/sclera: Conjunctivae normal.   Neck:      Thyroid: No thyromegaly.      Vascular: No JVD.      Trachea: No tracheal deviation.   Cardiovascular:      Rate and Rhythm: Normal rate.   Pulmonary:      Effort: No respiratory distress.   Abdominal:      Palpations: Abdomen is not rigid.      Comments: Abdomen soft, nontender, and nondistended on exam today.  Incisions are clean and dry, healing well without signs of infection   Musculoskeletal:         General: Normal range of motion.   Lymphadenopathy:      Cervical: No cervical adenopathy.   Skin:     General: Skin is warm and dry.      Findings: No erythema or rash.   Neurological:      Mental Status: She is alert and oriented to person, place, and time.   Psychiatric:         Behavior: Behavior normal.       GI notes reviewed   Ultrasound reviewed  HIDA scan reviewed    Pathology:  Final Pathologic Diagnosis Gallbladder, cholecystectomy:   - Benign gallbladder with no significant histologic abnormality.        Assessment & Plan:    33-year-old female with biliary dyskinesia status post robotic cholecystectomy with some abdominal soreness but recovering as expected.      - soreness should continue to improve over time.  Continue light duty while having the symptoms.  Advised to call with any development of fevers, chills, nausea, vomiting, or worsening pain.  She voiced understanding.    -pathology reviewed as above  - no further interventions or restrictions otherwise.  Provided return to  work letter.  - return to clinic as needed or if any issues arise.    Itzel Thompson PA-C  [facility]

## 2023-08-18 RX ORDER — PANTOPRAZOLE SODIUM 40 MG/1
40 TABLET, DELAYED RELEASE ORAL EVERY MORNING
Qty: 30 TABLET | Refills: 3 | Status: SHIPPED | OUTPATIENT
Start: 2023-08-18 | End: 2023-12-07 | Stop reason: SDUPTHER

## 2023-08-18 NOTE — TELEPHONE ENCOUNTER
REFILL REQUEST APPROVED  Requested Prescriptions   Pending Prescriptions Disp Refills    pantoprazole (PROTONIX) 40 MG tablet [Pharmacy Med Name: PANTOPRAZOLE 40MG TABLETS] 30 tablet 1     Sig: TAKE 1 TABLET(40 MG) BY MOUTH EVERY MORNING

## 2023-10-03 ENCOUNTER — LAB VISIT (OUTPATIENT)
Dept: LAB | Facility: HOSPITAL | Age: 33
End: 2023-10-03
Attending: FAMILY MEDICINE
Payer: COMMERCIAL

## 2023-10-03 ENCOUNTER — OFFICE VISIT (OUTPATIENT)
Dept: INTERNAL MEDICINE | Facility: CLINIC | Age: 33
End: 2023-10-03
Payer: COMMERCIAL

## 2023-10-03 VITALS
RESPIRATION RATE: 18 BRPM | HEIGHT: 65 IN | TEMPERATURE: 98 F | DIASTOLIC BLOOD PRESSURE: 78 MMHG | WEIGHT: 201.06 LBS | SYSTOLIC BLOOD PRESSURE: 128 MMHG | BODY MASS INDEX: 33.5 KG/M2

## 2023-10-03 DIAGNOSIS — Z01.810 PRE-OPERATIVE CARDIOVASCULAR EXAMINATION: ICD-10-CM

## 2023-10-03 DIAGNOSIS — S83.206A: ICD-10-CM

## 2023-10-03 DIAGNOSIS — Z01.419 PAP SMEAR, AS PART OF ROUTINE GYNECOLOGICAL EXAMINATION: ICD-10-CM

## 2023-10-03 DIAGNOSIS — Z01.818 PREOP EXAMINATION: ICD-10-CM

## 2023-10-03 DIAGNOSIS — S83.206A: Primary | ICD-10-CM

## 2023-10-03 LAB
BACTERIA #/AREA URNS HPF: ABNORMAL /HPF
BILIRUB UR QL STRIP: NEGATIVE
CLARITY UR: ABNORMAL
COLOR UR: YELLOW
GLUCOSE UR QL STRIP: NEGATIVE
HGB UR QL STRIP: NEGATIVE
KETONES UR QL STRIP: ABNORMAL
LEUKOCYTE ESTERASE UR QL STRIP: ABNORMAL
MICROSCOPIC COMMENT: ABNORMAL
NITRITE UR QL STRIP: NEGATIVE
PH UR STRIP: 6 [PH] (ref 5–8)
PROT UR QL STRIP: NEGATIVE
RBC #/AREA URNS HPF: 0 /HPF (ref 0–4)
SP GR UR STRIP: 1.02 (ref 1–1.03)
SQUAMOUS #/AREA URNS HPF: 25 /HPF
URN SPEC COLLECT METH UR: ABNORMAL
WBC #/AREA URNS HPF: 1 /HPF (ref 0–5)

## 2023-10-03 PROCEDURE — 90471 FLU VACCINE (QUAD) GREATER THAN OR EQUAL TO 3YO PRESERVATIVE FREE IM: ICD-10-PCS | Mod: S$GLB,,, | Performed by: FAMILY MEDICINE

## 2023-10-03 PROCEDURE — 99999 PR PBB SHADOW E&M-EST. PATIENT-LVL IV: ICD-10-PCS | Mod: PBBFAC,,, | Performed by: FAMILY MEDICINE

## 2023-10-03 PROCEDURE — 81000 URINALYSIS NONAUTO W/SCOPE: CPT | Performed by: FAMILY MEDICINE

## 2023-10-03 PROCEDURE — 90471 IMMUNIZATION ADMIN: CPT | Mod: S$GLB,,, | Performed by: FAMILY MEDICINE

## 2023-10-03 PROCEDURE — 90686 FLU VACCINE (QUAD) GREATER THAN OR EQUAL TO 3YO PRESERVATIVE FREE IM: ICD-10-PCS | Mod: S$GLB,,, | Performed by: FAMILY MEDICINE

## 2023-10-03 PROCEDURE — 99999 PR PBB SHADOW E&M-EST. PATIENT-LVL IV: CPT | Mod: PBBFAC,,, | Performed by: FAMILY MEDICINE

## 2023-10-03 PROCEDURE — 81025 URINE PREGNANCY TEST: CPT | Performed by: FAMILY MEDICINE

## 2023-10-03 PROCEDURE — 99213 OFFICE O/P EST LOW 20 MIN: CPT | Mod: 25,S$GLB,, | Performed by: FAMILY MEDICINE

## 2023-10-03 PROCEDURE — 99213 PR OFFICE/OUTPT VISIT, EST, LEVL III, 20-29 MIN: ICD-10-PCS | Mod: 25,S$GLB,, | Performed by: FAMILY MEDICINE

## 2023-10-03 PROCEDURE — 90686 IIV4 VACC NO PRSV 0.5 ML IM: CPT | Mod: S$GLB,,, | Performed by: FAMILY MEDICINE

## 2023-10-03 RX ORDER — GALCANEZUMAB 120 MG/ML
1 INJECTION, SOLUTION SUBCUTANEOUS
Status: CANCELLED | OUTPATIENT
Start: 2023-10-03

## 2023-10-03 RX ORDER — UBROGEPANT 100 MG/1
TABLET ORAL
Status: CANCELLED | OUTPATIENT
Start: 2023-10-03

## 2023-10-03 NOTE — PROGRESS NOTES
REPORT OF PHYSICIAN CONSULTATION FOR PRE-OPERATIVE EVALUATION  OFFICE VISIT 10/3/23  3:20 PM CDT    CHIEF COMPLAINT: Pre-op Exam      SUMMARY ASSESSMENT: Based on the history and physical exam I performed and review of the data presently available to me, Artur is at low risk for perioperative morbidity and mortality associated with this procedure. She may proceed with the planned operation without further evaluation or delay conditional upon results of pre-operative tests being normal or satisfactory to the surgeon and anesthesiologist.    CONSULTATION REQUESTED BY: Víctor Fernández MD ([facility], Fax [phone])  REASON FOR CONSULTATION (CHIEF COMPLAINT): Pre-operative evaluation, history and physical exam  ANTICIPATED OPERATION: Right knee diagnostic arthroscopy  ANTICIPATED DATE OF OPERATION: within 14 days  ANESTHESIA ANTICIPATED TO BE USED: regional with MAC  PREOPERATIVE TESTS ORDERED: Complete Blood Count, Metabolic Panel, ECG, and Urine HCG  ANESTHESIA HISTORY: No history of adverse reaction to anesthesia.  FERTILITY:  having regular periods  FUNCTIONAL AEROBIC CAPACITY REPORTED BY PATIENT: Equal to or greater than 5 METs  HEMATOLOGIC/HEMOSTATIC CONCERN: No history of hematologic disease or bleeding problems reported.  THROMBOEMBOLISM RISK: No history of thromboembolic events or hypercoagulable state.  ANTICOAGULATION: Artur is not on anticoagulation.  ASPIRIN/ANTIPLATELET THERAPY: Artur is not on antiplatelet therapy.    1. Torn meniscus of right knee  -     CBC Auto Differential; Future; Expected date: 10/03/2023  -     Comprehensive Metabolic Panel; Future; Expected date: 10/03/2023  -     EKG 12-lead; Future  -     Urinalysis; Future; Expected date: 10/03/2023  -     Pregnancy, urine rapid; Future; Expected date: 10/03/2023    2. Preop examination  -     CBC Auto Differential; Future; Expected date: 10/03/2023  -     Comprehensive Metabolic Panel; Future; Expected date: 10/03/2023  -     EKG  "12-lead; Future  -     Urinalysis; Future; Expected date: 10/03/2023  -     Pregnancy, urine rapid; Future; Expected date: 10/03/2023    3. Pre-operative cardiovascular examination  -     EKG 12-lead; Future    4. Referral for pap smear, as part of routine gynecological examination  -     Ambulatory referral/consult to Obstetrics / Gynecology; Future; Expected date: 10/10/2023    Other orders  -     Influenza - Quadrivalent (PF)    Unless noted herein, any chronic conditions are represented as and appear stable, and no other significant complaints or concerns were reported.    Follow up in about 8 weeks (around 11/30/2023) for annual exam.  Future Appointments   Date Time Provider Department Center   10/3/2023  4:30 PM SPECIMEN, [facility] SPECLAB [facility]   10/3/2023  5:10 PM LABORATORY, [facility] LAB [facility]   10/5/2023 11:00 AM EKG, [facility] SPECCPR [facility]   10/5/2023 11:15 AM Dari Lowe NP [facility] OBGYN [facility]   11/30/2023 10:20 AM RAJAT Cox MD [facility] IM [facility]       Review of Systems   Respiratory:  Negative for chest tightness and shortness of breath.    Cardiovascular:  Negative for chest pain.        Vitals:    10/03/23 1527   BP: 128/78   BP Location: Right arm   Patient Position: Sitting   BP Method: Large (Manual)   Resp: 18   Temp: 97.6 °F (36.4 °C)   Weight: 91.2 kg (201 lb 1 oz)   Height: 5' 5" (1.651 m)   Physical Exam  Vitals reviewed.   Constitutional:       General: She is not in acute distress.     Appearance: Normal appearance. She is not ill-appearing, toxic-appearing or diaphoretic.   HENT:      Head: Normocephalic and atraumatic.   Eyes:      General: No scleral icterus.     Conjunctiva/sclera: Conjunctivae normal.   Neck:      Vascular: No carotid bruit.   Cardiovascular:      Rate and Rhythm: Normal rate and regular rhythm.      Heart sounds: Normal heart sounds.   Pulmonary:      Effort: Pulmonary effort is normal.      Breath sounds: Normal breath sounds. " "  Abdominal:      Palpations: Abdomen is soft.      Tenderness: There is no abdominal tenderness.   Skin:     General: Skin is warm and dry.   Neurological:      Mental Status: She is alert and oriented to person, place, and time. Mental status is at baseline.   Psychiatric:         Mood and Affect: Mood normal.         Behavior: Behavior normal.         Judgment: Judgment normal.         CURRENT MEDICATIONS  She has a current medication list which includes the following prescription(s): albuterol, amitriptyline, buspirone, chlorthalidone, emgality pen, montelukast, olmesartan, ondansetron, pantoprazole, ubrelvy, and verapamil.     ALLERGIES  She is allergic to aspirin.      PROBLEM LIST  She has Dysmenorrhea; Primary hypertension; Migraine with aura and without status migrainosus, not intractable; Weight loss, unintentional; Prediabetes; and Elevated serum protein level on their problem list.    PAST MEDICAL HISTORY  She  has a past medical history of Anxiety, Asthma, Bilious vomiting with nausea, Depression, Epigastric pain, Hypertension, Migraine with aura and without status migrainosus, not intractable, and Migraines.    SURGICAL HISTORY  She has a past surgical history that includes Rotator cuff repair; Knee surgery (Right); Esophagogastroduodenoscopy (N/A, 5/30/2023); and Robot-assisted cholecystectomy using da Anika Xi (N/A, 7/26/2023).     SOCIAL HISTORY  TOBACCO USE HISTORY: Artur reports that she has never smoked. She has never used smokeless tobacco.  ALCOHOL USE HISTORY:  Artur reports current alcohol use.  RECREATIONAL DRUG USE HISTORY:  Artur reports no history of drug use.  Documentation entered by me for this encounter may have been done in part using speech-recognition technology. Although I have made an effort to ensure accuracy, "sound like" errors may exist and should be interpreted in context.   "

## 2023-10-03 NOTE — Clinical Note
Artur is at low risk for perioperative morbidity and mortality associated with knee arthroscopy. She may proceed with the planned operation without further evaluation or delay conditional upon results of pre-operative tests being normal or satisfactory to the surgeon and anesthesiologist.

## 2023-10-03 NOTE — LETTER
FAX    DATE: 10/03/2023  TO: Víctor Fernández MD  [address] [facility]; [address]  [facility] [address]  FAX: [phone]  FROM: RAJAT Cox MD  RE: Emeka Miramontes,  1990; MRN [id number]        Based on the history and physical exam I performed and review of the data presently available to me, Artur is at low risk for perioperative morbidity and mortality associated with this procedure. She may proceed with the planned operation without further evaluation or delay conditional upon results of pre-operative tests being normal or satisfactory to the surgeon and anesthesiologist.    CONFIDENTIALITY NOTICE: The accompanying facsimile is intended solely for the use of the recipient designated above. Document(s) transmitted herewith may contain information that is confidential and privileged. Delivery, distribution or dissemination of this communication other than to the intended recipient is strictly prohibited. If you have received this facsimile in error, please notify [facility]'s Compliance and Privacy Department immediately by telephone at [phone]. Thank you.

## 2023-10-03 NOTE — PATIENT INSTRUCTIONS
10/03/2023        TO WHOM IT MAY CONCERN:     RE:  Emeka Miramontes , [place] 1990     Artur was treated by me on 10/3/23.    Please extend to Artur all due courtesy and consideration and excuse her absence.    Sincerely,     CAITLIN Cox MD

## 2023-10-04 LAB — B-HCG UR QL: NEGATIVE

## 2023-10-05 ENCOUNTER — HOSPITAL ENCOUNTER (OUTPATIENT)
Dept: CARDIOLOGY | Facility: HOSPITAL | Age: 33
Discharge: HOME | End: 2023-10-05
Attending: FAMILY MEDICINE
Payer: COMMERCIAL

## 2023-10-05 ENCOUNTER — OFFICE VISIT (OUTPATIENT)
Dept: OBSTETRICS AND GYNECOLOGY | Facility: CLINIC | Age: 33
End: 2023-10-05
Payer: COMMERCIAL

## 2023-10-05 VITALS
HEIGHT: 65 IN | SYSTOLIC BLOOD PRESSURE: 106 MMHG | BODY MASS INDEX: 32.76 KG/M2 | WEIGHT: 196.63 LBS | DIASTOLIC BLOOD PRESSURE: 68 MMHG

## 2023-10-05 DIAGNOSIS — Z01.419 ENCOUNTER FOR ANNUAL ROUTINE GYNECOLOGICAL EXAMINATION: Primary | ICD-10-CM

## 2023-10-05 DIAGNOSIS — Z12.4 CERVICAL CANCER SCREENING: ICD-10-CM

## 2023-10-05 DIAGNOSIS — Z01.818 PREOP EXAMINATION: ICD-10-CM

## 2023-10-05 DIAGNOSIS — Z01.419 PAP SMEAR, AS PART OF ROUTINE GYNECOLOGICAL EXAMINATION: ICD-10-CM

## 2023-10-05 DIAGNOSIS — Z01.810 PRE-OPERATIVE CARDIOVASCULAR EXAMINATION: ICD-10-CM

## 2023-10-05 DIAGNOSIS — S83.206A: ICD-10-CM

## 2023-10-05 PROCEDURE — 99999 PR PBB SHADOW E&M-EST. PATIENT-LVL III: CPT | Mod: PBBFAC,,,

## 2023-10-05 PROCEDURE — 87624 HPV HI-RISK TYP POOLED RSLT: CPT

## 2023-10-05 PROCEDURE — 99385 PR PREVENTIVE VISIT,NEW,18-39: ICD-10-PCS | Mod: S$GLB,,,

## 2023-10-05 PROCEDURE — 88175 CYTOPATH C/V AUTO FLUID REDO: CPT

## 2023-10-05 PROCEDURE — 93010 ELECTROCARDIOGRAM REPORT: CPT | Mod: ,,, | Performed by: INTERNAL MEDICINE

## 2023-10-05 PROCEDURE — 99999 PR PBB SHADOW E&M-EST. PATIENT-LVL III: ICD-10-PCS | Mod: PBBFAC,,,

## 2023-10-05 PROCEDURE — 99385 PREV VISIT NEW AGE 18-39: CPT | Mod: S$GLB,,,

## 2023-10-05 PROCEDURE — 93010 EKG 12-LEAD: ICD-10-PCS | Mod: ,,, | Performed by: INTERNAL MEDICINE

## 2023-10-05 PROCEDURE — 93005 ELECTROCARDIOGRAM TRACING: CPT

## 2023-10-05 NOTE — PROGRESS NOTES
Subjective:       Patient ID: Emeka Miramontes is a 33 y.o. female.    Chief Complaint:  Annual Exam      History of Present Illness  HPI  Annual Exam-Premenopausal  Patient presents for annual exam. The patient has no complaints today. The patient is sexually active with females. GYN screening history: last pap: was normal and patient does not recall when last pap was. The patient wears seatbelts: yes. The patient participates in regular exercise: no. Has the patient ever been transfused or tattooed?: yes, tattoos. The patient reports that there is not domestic violence in her life. Menses are regular with periods lasting 4-5 days.  Denies excessive bleeding or cramping.      GYN & OB History  Patient's last menstrual period was 09/15/2023 (exact date).   Date of Last Pap: No result found    OB History    Para Term  AB Living   0 0 0 0 0 0   SAB IAB Ectopic Multiple Live Births   0 0 0 0         Review of Systems  Review of Systems   All other systems reviewed and are negative.          Objective:      Physical Exam:   Constitutional: She is oriented to person, place, and time. She appears well-developed and well-nourished.    HENT:   Head: Normocephalic and atraumatic.   Nose: Nose normal.    Eyes: Pupils are equal, round, and reactive to light. Conjunctivae and EOM are normal.     Cardiovascular:  Normal rate and regular rhythm.             Pulmonary/Chest: Effort normal. She has no decreased breath sounds. She has no rhonchi. Right breast exhibits no inverted nipple, no mass, no nipple discharge, no tenderness and no bleeding. Left breast exhibits no inverted nipple, no mass, no nipple discharge, no tenderness and no bleeding. Breasts are symmetrical.        Abdominal: Soft. There is no abdominal tenderness.     Genitourinary:    Inguinal canal, vagina, uterus, right adnexa, left adnexa and rectum normal.      Pelvic exam was performed with patient supine.   The external female genitalia was  normal.   No external genitalia lesions identified,Genitalia hair distrobution normal .   Labial bartholins normal.Cervix is normal. Right adnexum displays no mass and no tenderness. Left adnexum displays no mass and no tenderness. No  no vaginal discharge, tenderness or bleeding in the vagina. Vagina was moist.Cervix exhibits no motion tenderness, no discharge and no tenderness.    pap smear completedUerus contour normal  Uterus is not tender. Uterus size: 8 cm.Normal urethral meatus.          Musculoskeletal: Normal range of motion and moves all extremeties.       Neurological: She is alert and oriented to person, place, and time.    Skin: Skin is warm and dry.    Psychiatric: She has a normal mood and affect. Her speech is normal and behavior is normal. Mood, judgment and thought content normal.             Assessment:        1. Encounter for annual routine gynecological examination    2. Referral for pap smear, as part of routine gynecological examination    3. Cervical cancer screening               Plan:   Continue annual well woman exam.  Pap collected. Reviewed updated recommendations for pap smears (every 3 years) in low risk patients.   Recommend annual pelvic exams.  Reviewed recommendations for annual CBE.    Diagnosis and orders this visit:  Encounter for annual routine gynecological examination    Referral for pap smear, as part of routine gynecological examination  -     Ambulatory referral/consult to Obstetrics / Gynecology  -     Liquid-Based Pap Smear, Screening  -     HPV High Risk Genotypes, PCR    Cervical cancer screening  -     Liquid-Based Pap Smear, Screening  -     HPV High Risk Genotypes, PCR      Dari Lowe NP

## 2023-10-09 ENCOUNTER — TELEPHONE (OUTPATIENT)
Dept: INTERNAL MEDICINE | Facility: CLINIC | Age: 33
End: 2023-10-09
Payer: COMMERCIAL

## 2023-10-09 NOTE — TELEPHONE ENCOUNTER
----- Message from Sherri Alcaraz sent at 10/9/2023  9:39 AM CDT -----  Contact: [facility]/ Dr. Jolene Helms is calling to speak to the nurse regarding the Pre op clearance, labs , and ekg, please fax to  if any questions please call at     Thanks  LJ

## 2023-10-09 NOTE — TELEPHONE ENCOUNTER
----- Message from June Cervantes sent at 10/9/2023  1:07 PM CDT -----  Regarding: [facility]/Dr Paez  States she still hasn't received her Surgery Clearance. Please call [facility] [phone] or fax# [phone]. Thank you

## 2023-10-12 LAB
CLINICAL INFO: NORMAL
CYTO CVX: NORMAL
CYTOLOGIST CVX/VAG CYTO: NORMAL
CYTOLOGIST CVX/VAG CYTO: NORMAL
CYTOLOGY CMNT CVX/VAG CYTO-IMP: NORMAL
CYTOLOGY PAP THIN PREP EXPLANATION: NORMAL
DATE OF PREVIOUS PAP: NORMAL
DATE PREVIOUS BX: NORMAL
GEN CATEG CVX/VAG CYTO-IMP: NORMAL
HPV I/H RISK 4 DNA CVX QL NAA+PROBE: NOT DETECTED
LMP START DATE: NORMAL
MICROORGANISM CVX/VAG CYTO: NORMAL
PATHOLOGIST CVX/VAG CYTO: NORMAL
SERVICE CMNT-IMP: NORMAL
SPECIMEN SOURCE CVX/VAG CYTO: NORMAL
STAT OF ADQ CVX/VAG CYTO-IMP: NORMAL

## 2023-11-30 ENCOUNTER — TELEPHONE (OUTPATIENT)
Dept: INTERNAL MEDICINE | Facility: CLINIC | Age: 33
End: 2023-11-30
Payer: COMMERCIAL

## 2023-12-07 ENCOUNTER — OFFICE VISIT (OUTPATIENT)
Dept: INTERNAL MEDICINE | Facility: CLINIC | Age: 33
End: 2023-12-07
Payer: COMMERCIAL

## 2023-12-07 ENCOUNTER — LAB VISIT (OUTPATIENT)
Dept: LAB | Facility: HOSPITAL | Age: 33
End: 2023-12-07
Attending: FAMILY MEDICINE
Payer: COMMERCIAL

## 2023-12-07 VITALS
HEART RATE: 82 BPM | DIASTOLIC BLOOD PRESSURE: 105 MMHG | OXYGEN SATURATION: 98 % | BODY MASS INDEX: 34.01 KG/M2 | HEIGHT: 65 IN | SYSTOLIC BLOOD PRESSURE: 150 MMHG | RESPIRATION RATE: 18 BRPM | WEIGHT: 204.13 LBS | TEMPERATURE: 97 F

## 2023-12-07 DIAGNOSIS — I10 PRIMARY HYPERTENSION: Chronic | ICD-10-CM

## 2023-12-07 DIAGNOSIS — K21.9 GASTROESOPHAGEAL REFLUX DISEASE WITHOUT ESOPHAGITIS: ICD-10-CM

## 2023-12-07 DIAGNOSIS — D64.9 ANEMIA, UNSPECIFIED TYPE: ICD-10-CM

## 2023-12-07 DIAGNOSIS — R77.9 ELEVATED SERUM PROTEIN LEVEL: Chronic | ICD-10-CM

## 2023-12-07 DIAGNOSIS — K92.0 HEMATEMESIS OF UNKNOWN ETIOLOGY: ICD-10-CM

## 2023-12-07 DIAGNOSIS — Z91.89 AT RISK FOR MEDICATION NONADHERENCE: ICD-10-CM

## 2023-12-07 DIAGNOSIS — D64.9 ANEMIA, UNSPECIFIED TYPE: Primary | ICD-10-CM

## 2023-12-07 DIAGNOSIS — Z00.00 PREVENTATIVE HEALTH CARE: ICD-10-CM

## 2023-12-07 DIAGNOSIS — R10.13 EPIGASTRIC DISCOMFORT: ICD-10-CM

## 2023-12-07 LAB
FERRITIN SERPL-MCNC: 46 NG/ML (ref 20–300)
HCT VFR BLD AUTO: 40.6 % (ref 37–48.5)
HGB BLD-MCNC: 13.2 G/DL (ref 12–16)
IRON SERPL-MCNC: 66 UG/DL (ref 30–160)
PROT SERPL-MCNC: 8.5 G/DL (ref 6–8.4)
SATURATED IRON: 16 % (ref 20–50)
TOTAL IRON BINDING CAPACITY: 413 UG/DL (ref 250–450)
TRANSFERRIN SERPL-MCNC: 279 MG/DL (ref 200–375)

## 2023-12-07 PROCEDURE — 82728 ASSAY OF FERRITIN: CPT | Performed by: FAMILY MEDICINE

## 2023-12-07 PROCEDURE — 85018 HEMOGLOBIN: CPT | Performed by: FAMILY MEDICINE

## 2023-12-07 PROCEDURE — 84466 ASSAY OF TRANSFERRIN: CPT | Performed by: FAMILY MEDICINE

## 2023-12-07 PROCEDURE — 99214 PR OFFICE/OUTPT VISIT, EST, LEVL IV, 30-39 MIN: ICD-10-PCS | Mod: S$GLB,,, | Performed by: FAMILY MEDICINE

## 2023-12-07 PROCEDURE — 83540 ASSAY OF IRON: CPT | Performed by: FAMILY MEDICINE

## 2023-12-07 PROCEDURE — 36415 COLL VENOUS BLD VENIPUNCTURE: CPT | Performed by: FAMILY MEDICINE

## 2023-12-07 PROCEDURE — 99999 PR PBB SHADOW E&M-EST. PATIENT-LVL V: CPT | Mod: PBBFAC,,, | Performed by: FAMILY MEDICINE

## 2023-12-07 PROCEDURE — 85014 HEMATOCRIT: CPT | Performed by: FAMILY MEDICINE

## 2023-12-07 PROCEDURE — 99999 PR PBB SHADOW E&M-EST. PATIENT-LVL V: ICD-10-PCS | Mod: PBBFAC,,, | Performed by: FAMILY MEDICINE

## 2023-12-07 PROCEDURE — 99214 OFFICE O/P EST MOD 30 MIN: CPT | Mod: S$GLB,,, | Performed by: FAMILY MEDICINE

## 2023-12-07 PROCEDURE — 84155 ASSAY OF PROTEIN SERUM: CPT | Performed by: FAMILY MEDICINE

## 2023-12-07 RX ORDER — OLMESARTAN MEDOXOMIL 40 MG/1
40 TABLET ORAL DAILY
Qty: 30 TABLET | Refills: 1 | Status: SHIPPED | OUTPATIENT
Start: 2023-12-07 | End: 2024-02-01 | Stop reason: SDUPTHER

## 2023-12-07 RX ORDER — ALBUTEROL SULFATE 90 UG/1
AEROSOL, METERED RESPIRATORY (INHALATION)
Status: CANCELLED | OUTPATIENT
Start: 2023-12-07 | End: 2024-12-06

## 2023-12-07 RX ORDER — PANTOPRAZOLE SODIUM 40 MG/1
40 TABLET, DELAYED RELEASE ORAL EVERY MORNING
Qty: 30 TABLET | Refills: 3 | Status: CANCELLED | OUTPATIENT
Start: 2023-12-07

## 2023-12-07 RX ORDER — MONTELUKAST SODIUM 10 MG/1
10 TABLET ORAL DAILY
Status: CANCELLED | OUTPATIENT
Start: 2023-12-07

## 2023-12-07 RX ORDER — BUSPIRONE HYDROCHLORIDE 15 MG/1
TABLET ORAL
Status: CANCELLED | OUTPATIENT
Start: 2023-12-07

## 2023-12-07 RX ORDER — CHLORTHALIDONE 25 MG/1
25 TABLET ORAL DAILY
Qty: 30 TABLET | Refills: 1 | Status: SHIPPED | OUTPATIENT
Start: 2023-12-07 | End: 2024-02-01 | Stop reason: SDUPTHER

## 2023-12-07 RX ORDER — PANTOPRAZOLE SODIUM 40 MG/1
40 TABLET, DELAYED RELEASE ORAL EVERY MORNING
Qty: 30 TABLET | Refills: 1 | Status: SHIPPED | OUTPATIENT
Start: 2023-12-07 | End: 2024-02-15

## 2023-12-07 NOTE — TELEPHONE ENCOUNTER
No care due was identified.  [facility] Embedded Care Due Messages. Reference number: [id number].   12/07/2023 12:21:07 PM CST

## 2023-12-07 NOTE — PROGRESS NOTES
OFFICE VISIT 12/7/23  9:40 AM CST    CHIEF COMPLAINT: Follow-up    1. Anemia, unspecified type  -     Hemoglobin; Future; Expected date: 12/07/2023  -     Hematocrit; Future; Expected date: 12/07/2023  -     Ferritin; Future; Expected date: 12/07/2023  -     Iron and TIBC; Future; Expected date: 12/07/2023    2. Elevated serum protein level  -     PROTEIN, TOTAL; Future; Expected date: 12/07/2023    3. Primary hypertension  -     chlorthalidone (HYGROTEN) 25 MG Tab; Take 1 tablet (25 mg total) by mouth once daily.  Dispense: 30 tablet; Refill: 1  -     olmesartan (BENICAR) 40 MG tablet; Take 1 tablet (40 mg total) by mouth once daily.  Dispense: 30 tablet; Refill: 1    4. Gastroesophageal reflux disease without esophagitis  -     pantoprazole (PROTONIX) 40 MG tablet; Take 1 tablet (40 mg total) by mouth every morning.  Dispense: 30 tablet; Refill: 1    5. Preventative health care  -     multivitamin-Ca-iron-minerals 18-0.4 mg Tab; Take 1 tablet by mouth once daily.    6. At risk for medication nonadherence  Assessment & Plan:  Pharmacy records suggest poor medication adherence. We discussed strategies to help her overcome any barriers to adherence to medication regimen.       Unless noted herein, any chronic conditions are represented as and appear stable, and no other significant complaints or concerns were reported.    Follow up in about 3 weeks (around 12/28/2023) for office visit, review test results after completion, discuss plan, re-evaluation.     Review of Systems   Respiratory:  Negative for chest tightness and shortness of breath.    Cardiovascular:  Negative for chest pain.   Endocrine: Negative for polydipsia and polyuria.       Vitals:    12/07/23 1033 12/07/23 1038 12/07/23 1100   BP: (!) 134/100 (!) 138/104 (!) 150/105   BP Location: Right arm Right arm    Patient Position: Sitting Sitting    BP Method: Large (Manual) Large (Manual)    Pulse: 82     Resp: 18     Temp: 97.2 °F (36.2 °C)     SpO2: 98%    "  Weight: 92.6 kg (204 lb 2.3 oz)     Height: 5' 5" (1.651 m)     Physical Exam  Vitals reviewed.   Constitutional:       General: She is not in acute distress.     Appearance: Normal appearance. She is not ill-appearing or diaphoretic.   Cardiovascular:      Rate and Rhythm: Normal rate and regular rhythm.   Pulmonary:      Effort: Pulmonary effort is normal.      Breath sounds: Normal breath sounds.   Skin:     General: Skin is warm and dry.   Neurological:      Mental Status: She is alert and oriented to person, place, and time. Mental status is at baseline.   Psychiatric:         Mood and Affect: Mood normal.         Behavior: Behavior normal.         Judgment: Judgment normal.       Documentation entered by me for this encounter may have been done in part using speech-recognition technology. Although I have made an effort to ensure accuracy, "sound like" errors may exist and should be interpreted in context.   "

## 2023-12-08 RX ORDER — PANTOPRAZOLE SODIUM 40 MG/1
40 TABLET, DELAYED RELEASE ORAL EVERY MORNING
Qty: 90 TABLET | OUTPATIENT
Start: 2023-12-08

## 2023-12-08 NOTE — TELEPHONE ENCOUNTER
Refill Decision Note   Emeka Miramontes  is requesting a refill authorization.  Brief Assessment and Rationale for Refill:  Quick Discontinue     Medication Therapy Plan:         Comments:     Note composed:5:40 PM 12/08/2023

## 2023-12-20 PROBLEM — Z91.89 AT RISK FOR MEDICATION NONADHERENCE: Chronic | Status: ACTIVE | Noted: 2023-12-07

## 2023-12-20 NOTE — ASSESSMENT & PLAN NOTE
Pharmacy records suggest poor medication adherence. We discussed strategies to help her overcome any barriers to adherence to medication regimen.

## 2024-02-01 ENCOUNTER — OFFICE VISIT (OUTPATIENT)
Dept: INTERNAL MEDICINE | Facility: CLINIC | Age: 34
End: 2024-02-01
Payer: COMMERCIAL

## 2024-02-01 VITALS
TEMPERATURE: 97 F | DIASTOLIC BLOOD PRESSURE: 70 MMHG | WEIGHT: 199.75 LBS | SYSTOLIC BLOOD PRESSURE: 118 MMHG | HEIGHT: 65 IN | BODY MASS INDEX: 33.28 KG/M2

## 2024-02-01 DIAGNOSIS — D64.9 ANEMIA, UNSPECIFIED TYPE: ICD-10-CM

## 2024-02-01 DIAGNOSIS — J45.21 MILD INTERMITTENT ASTHMA WITH ACUTE EXACERBATION: Primary | ICD-10-CM

## 2024-02-01 DIAGNOSIS — I10 PRIMARY HYPERTENSION: Chronic | ICD-10-CM

## 2024-02-01 DIAGNOSIS — K21.9 GASTROESOPHAGEAL REFLUX DISEASE WITHOUT ESOPHAGITIS: ICD-10-CM

## 2024-02-01 DIAGNOSIS — R73.03 PREDIABETES: ICD-10-CM

## 2024-02-01 DIAGNOSIS — G43.109 MIGRAINE WITH AURA AND WITHOUT STATUS MIGRAINOSUS, NOT INTRACTABLE: Chronic | ICD-10-CM

## 2024-02-01 DIAGNOSIS — L91.0 KELOID: ICD-10-CM

## 2024-02-01 PROCEDURE — 99999 PR PBB SHADOW E&M-EST. PATIENT-LVL III: CPT | Mod: PBBFAC,,, | Performed by: PHYSICIAN ASSISTANT

## 2024-02-01 PROCEDURE — 99213 OFFICE O/P EST LOW 20 MIN: CPT | Mod: S$GLB,,, | Performed by: PHYSICIAN ASSISTANT

## 2024-02-01 RX ORDER — OLMESARTAN MEDOXOMIL 40 MG/1
40 TABLET ORAL DAILY
Qty: 90 TABLET | Refills: 3 | Status: SHIPPED | OUTPATIENT
Start: 2024-02-01 | End: 2025-01-31

## 2024-02-01 RX ORDER — ALBUTEROL SULFATE 90 UG/1
2 AEROSOL, METERED RESPIRATORY (INHALATION) EVERY 6 HOURS PRN
Qty: 18 G | Refills: 6 | Status: SHIPPED | OUTPATIENT
Start: 2024-02-01 | End: 2025-01-31

## 2024-02-01 RX ORDER — TRIAMCINOLONE ACETONIDE 1 MG/G
CREAM TOPICAL 2 TIMES DAILY
Qty: 28.4 G | Refills: 0 | Status: SHIPPED | OUTPATIENT
Start: 2024-02-01 | End: 2024-06-13

## 2024-02-01 RX ORDER — CHLORTHALIDONE 25 MG/1
25 TABLET ORAL DAILY
Qty: 90 TABLET | Refills: 3 | Status: SHIPPED | OUTPATIENT
Start: 2024-02-01 | End: 2025-01-31

## 2024-02-01 NOTE — PROGRESS NOTES
Subjective:      Patient ID: Emeka Miramontes is a 33 y.o. female.    Chief Complaint: Follow-up    HPI  Here today for a follow up for HTN. Pt currently on chlorthalidone, verapamil, and olmesartan. Doing well on all medication. Doesn't check BP at home but reports compliance with medications and feels much better now that she is back on them.   Needs refill on inhaler. Reports less than 2 flare ups a week.   Starting new diet to help lose weight and stay healthy. Just good choices.   Pt reports painful scar on her abd that is very sensitive to touch.     Patient Active Problem List   Diagnosis    Dysmenorrhea    Primary hypertension    Migraine with aura and without status migrainosus, not intractable    Weight loss, unintentional    Prediabetes    Elevated serum protein level    At risk for medication nonadherence    Gastroesophageal reflux disease without esophagitis    Anemia         Current Outpatient Medications:     ALBUTEROL INHL, Inhale into the lungs., Disp: , Rfl:     amitriptyline (ELAVIL) 25 MG tablet, every evening., Disp: , Rfl:     busPIRone (BUSPAR) 15 MG tablet, TAKE 1/2 TABLET BY MOUTH TWICE DAILY FOR ANXIETY, Disp: , Rfl:     galcanezumab-gnlm (EMGALITY PEN) 120 mg/mL PnIj, 1 mL., Disp: , Rfl:     montelukast (SINGULAIR) 10 mg tablet, Take 1 tablet by mouth once daily., Disp: , Rfl:     multivitamin-Ca-iron-minerals 18-0.4 mg Tab, Take 1 tablet by mouth once daily., Disp: , Rfl:     ondansetron (ZOFRAN) 4 MG tablet, , Disp: , Rfl:     pantoprazole (PROTONIX) 40 MG tablet, Take 1 tablet (40 mg total) by mouth every morning., Disp: 30 tablet, Rfl: 1    UBROGEPANT 100 mg tablet, Take 1 tablet   as needed take one tablet at the onset of headache. Can repeat in 2 hours if needed. Max 200 mg/day, Disp: , Rfl:     verapamiL (CALAN) 40 MG Tab, verapamil 40 mg tablet  TAKE 1 TABLET BY MOUTH TWICE DAILY, Disp: , Rfl:     albuterol (PROVENTIL HFA) 90 mcg/actuation inhaler, Inhale 2 puffs into the  "lungs every 6 (six) hours as needed for Wheezing. Rescue, Disp: 18 g, Rfl: 6    chlorthalidone (HYGROTEN) 25 MG Tab, Take 1 tablet (25 mg total) by mouth once daily., Disp: 90 tablet, Rfl: 3    olmesartan (BENICAR) 40 MG tablet, Take 1 tablet (40 mg total) by mouth once daily., Disp: 90 tablet, Rfl: 3    triamcinolone acetonide 0.1% (KENALOG) 0.1 % cream, Apply topically 2 (two) times daily. for 7 days, Disp: 28.4 g, Rfl: 0    Review of Systems   Constitutional:  Negative for activity change, appetite change, chills, diaphoresis, fatigue, fever and unexpected weight change.   HENT: Negative.  Negative for congestion, hearing loss, postnasal drip, rhinorrhea, sore throat, trouble swallowing and voice change.    Eyes: Negative.  Negative for visual disturbance.   Respiratory: Negative.  Negative for cough, choking, chest tightness and shortness of breath.    Cardiovascular:  Negative for chest pain, palpitations and leg swelling.   Gastrointestinal:  Negative for abdominal distention, abdominal pain, blood in stool, constipation, diarrhea, nausea and vomiting.   Endocrine: Negative for cold intolerance, heat intolerance, polydipsia and polyuria.   Genitourinary: Negative.  Negative for difficulty urinating and frequency.   Musculoskeletal:  Negative for arthralgias, back pain, gait problem, joint swelling and myalgias.   Skin:  Negative for color change, pallor, rash and wound.   Neurological:  Negative for dizziness, tremors, weakness, light-headedness, numbness and headaches.   Hematological:  Negative for adenopathy.   Psychiatric/Behavioral:  Negative for behavioral problems, confusion, self-injury, sleep disturbance and suicidal ideas. The patient is not nervous/anxious.      Objective:   /70 (BP Location: Right arm, Patient Position: Sitting, BP Method: Large (Manual))   Temp 96.5 °F (35.8 °C) (Tympanic)   Ht 5' 5" (1.651 m)   Wt 90.6 kg (199 lb 11.8 oz)   BMI 33.24 kg/m²     Physical Exam  Vitals and " nursing note reviewed.   Constitutional:       General: She is not in acute distress.     Appearance: Normal appearance. She is well-developed. She is not ill-appearing, toxic-appearing or diaphoretic.   HENT:      Head: Normocephalic and atraumatic.   Cardiovascular:      Rate and Rhythm: Normal rate and regular rhythm.      Heart sounds: Normal heart sounds. No murmur heard.     No friction rub. No gallop.   Pulmonary:      Effort: Pulmonary effort is normal. No respiratory distress.      Breath sounds: Normal breath sounds. No wheezing or rales.   Abdominal:       Musculoskeletal:         General: Normal range of motion.   Skin:     General: Skin is warm.      Capillary Refill: Capillary refill takes less than 2 seconds.      Findings: No rash.   Neurological:      Mental Status: She is alert and oriented to person, place, and time.      Motor: No weakness.      Gait: Gait normal.   Psychiatric:         Mood and Affect: Mood normal.         Behavior: Behavior normal.         Thought Content: Thought content normal.         Judgment: Judgment normal.       Lab Visit on 12/07/2023   Component Date Value Ref Range Status    Hemoglobin 12/07/2023 13.2  12.0 - 16.0 g/dL Final    Hematocrit 12/07/2023 40.6  37.0 - 48.5 % Final    Ferritin 12/07/2023 46  20.0 - 300.0 ng/mL Final    Iron 12/07/2023 66  30 - 160 ug/dL Final    Transferrin 12/07/2023 279  200 - 375 mg/dL Final    TIBC 12/07/2023 413  250 - 450 ug/dL Final    Saturated Iron 12/07/2023 16 (L)  20 - 50 % Final    Total Protein 12/07/2023 8.5 (H)  6.0 - 8.4 g/dL Final   Office Visit on 10/05/2023   Component Date Value Ref Range Status    Cytology ThinPrep Pap Source 10/05/2023 Cervix   Final    Cytology ThinPrep Pap Report Status 10/05/2023 DNR   Final    Cytology Thinprep PAP Clinical His* 10/05/2023 Routine exam   Corrected    Cytology ThinPrep Pap LMP 10/05/2023 09/15/2023   Final    Cytology ThinPrep Previous PAP 10/05/2023 Unknown   Final    Cytology  ThinPrep Previous Biopsy 10/05/2023 Unknown   Final    Cytology ThinPrep PAP Adequacy 10/05/2023 SEE BELOW   Final    Comment: Satisfactory for evaluation. Endocervical/transformation zone   component   present.      Cytology ThinPrep PAP General Gricelda* 10/05/2023 DNR   Final    Cytology ThinPrep PAP Interpretati* 10/05/2023 SEE BELOW   Final    Cytology Results: Negative for intraepithelial lesion or malignancy.    Cytology ThinPrep PAP Comment 10/05/2023 SEE BELOW   Final    This Pap test has been evaluated with computer assisted technology.    Cytotechnologist 10/05/2023 SEE BELOW   Final    Comment: CMJ, CT(ASCP) CT screening location: [email or web address] [facility]-[address], [place],[address]      Review Cytotechnologist 10/05/2023 DNR   Final    Pathologist 10/05/2023 DNR   Final    Cytology ThinPrep PAP Infection 10/05/2023 SEE BELOW   Final    Fungal organisms morphologically consistent with Candida spp.    Cytology Thin Prep Pap Explanation 10/05/2023 SEE BELOW   Final    Comment: EXPLANATORY NOTE:     The Pap is a screening test for cervical cancer. It is   not a diagnostic test and is subject to false negative   and false positive results. It is most reliable when a   satisfactory sample, regularly obtained, is submitted   with relevant clinical findings and history, and when   the Pap result is evaluated along with historic and   current clinical information.    TEST PERFORMED AT:  [facility]. [place] [id number]  TIFFANY WEBER MD      HPV DNA High Risk 10/05/2023 Not Detected  NOT DETECTED Final    Comment: Not Detected High Risk HPV types (16,18,31,33,35,39,45,51,52,  56,58,59,66,68) were not detected. Other HPV  types which cause anogenital lesions may be present.  The significance of the other types of HPV  in malignant processes has not been established.    Methodology: Real Time PCR                        TEST PERFORMED AT:  [facility]  [address]  [address] [place] [address]  DERIC TREVINO MD,PHD     Lab Visit on 10/03/2023   Component Date Value Ref Range Status    WBC 10/03/2023 6.91  3.90 - 12.70 K/uL Final    RBC 10/03/2023 3.72 (L)  4.00 - 5.40 M/uL Final    Hemoglobin 10/03/2023 11.7 (L)  12.0 - 16.0 g/dL Final    Hematocrit 10/03/2023 36.6 (L)  37.0 - 48.5 % Final    MCV 10/03/2023 98  82 - 98 fL Final    MCH 10/03/2023 31.5 (H)  27.0 - 31.0 pg Final    MCHC 10/03/2023 32.0  32.0 - 36.0 g/dL Final    RDW 10/03/2023 15.6 (H)  11.5 - 14.5 % Final    Platelets 10/03/2023 284  150 - 450 K/uL Final    MPV 10/03/2023 11.7  9.2 - 12.9 fL Final    Immature Granulocytes 10/03/2023 0.1  0.0 - 0.5 % Final    Gran # (ANC) 10/03/2023 3.8  1.8 - 7.7 K/uL Final    Immature Grans (Abs) 10/03/2023 0.01  0.00 - 0.04 K/uL Final    Comment: Mild elevation in immature granulocytes is non specific and   can be seen in a variety of conditions including stress response,   acute inflammation, trauma and pregnancy. Correlation with other   laboratory and clinical findings is essential.      Lymph # 10/03/2023 2.0  1.0 - 4.8 K/uL Final    Mono # 10/03/2023 0.9  0.3 - 1.0 K/uL Final    Eos # 10/03/2023 0.3  0.0 - 0.5 K/uL Final    Baso # 10/03/2023 0.03  0.00 - 0.20 K/uL Final    nRBC 10/03/2023 0  0 /100 WBC Final    Gran % 10/03/2023 54.5  38.0 - 73.0 % Final    Lymph % 10/03/2023 28.4  18.0 - 48.0 % Final    Mono % 10/03/2023 13.0  4.0 - 15.0 % Final    Eosinophil % 10/03/2023 3.6  0.0 - 8.0 % Final    Basophil % 10/03/2023 0.4  0.0 - 1.9 % Final    Differential Method 10/03/2023 Automated   Final    Sodium 10/03/2023 143  136 - 145 mmol/L Final    Potassium 10/03/2023 4.4  3.5 - 5.1 mmol/L Final    Chloride 10/03/2023 109  95 - 110 mmol/L Final    CO2 10/03/2023 26  23 - 29 mmol/L Final    Glucose 10/03/2023 80  70 - 110 mg/dL Final    BUN 10/03/2023 11  6 - 20 mg/dL Final    Creatinine 10/03/2023 0.8  0.5 - 1.4 mg/dL Final    Calcium  10/03/2023 9.9  8.7 - 10.5 mg/dL Final    Total Protein 10/03/2023 8.5 (H)  6.0 - 8.4 g/dL Final    Albumin 10/03/2023 4.4  3.5 - 5.2 g/dL Final    Total Bilirubin 10/03/2023 0.2  0.1 - 1.0 mg/dL Final    Comment: For infants and newborns, interpretation of results should be based  on gestational age, weight and in agreement with clinical  observations.    Premature Infant recommended reference ranges:  Up to 24 hours.............<8.0 mg/dL  Up to 48 hours............<12.0 mg/dL  3-5 days..................<15.0 mg/dL  6-29 days.................<15.0 mg/dL      Alkaline Phosphatase 10/03/2023 41 (L)  55 - 135 U/L Final    AST 10/03/2023 23  10 - 40 U/L Final    ALT 10/03/2023 24  10 - 44 U/L Final    eGFR 10/03/2023 >60.0  >60 mL/min/1.73 m^2 Final    Anion Gap 10/03/2023 8  8 - 16 mmol/L Final   Lab Visit on 10/03/2023   Component Date Value Ref Range Status    Specimen UA 10/03/2023 Urine, Clean Catch   Final    Color, UA 10/03/2023 Yellow  Yellow, Straw, Stephanie Final    Appearance, UA 10/03/2023 Hazy (A)  Clear Final    pH, UA 10/03/2023 6.0  5.0 - 8.0 Final    Specific Gravity, UA 10/03/2023 1.025  1.005 - 1.030 Final    Protein, UA 10/03/2023 Negative  Negative Final    Comment: Recommend a 24 hour urine protein or a urine   protein/creatinine ratio if globulin induced proteinuria is  clinically suspected.      Glucose, UA 10/03/2023 Negative  Negative Final    Ketones, UA 10/03/2023 Trace (A)  Negative Final    Bilirubin (UA) 10/03/2023 Negative  Negative Final    Occult Blood UA 10/03/2023 Negative  Negative Final    Nitrite, UA 10/03/2023 Negative  Negative Final    Leukocytes, UA 10/03/2023 1+ (A)  Negative Final    Preg Test, Ur 10/03/2023 Negative   Final    RBC, UA 10/03/2023 0  0 - 4 /hpf Final    WBC, UA 10/03/2023 1  0 - 5 /hpf Final    Bacteria 10/03/2023 Many (A)  None-Occ /hpf Final    Squam Epithel, UA 10/03/2023 25  /hpf Final    Microscopic Comment 10/03/2023 SEE COMMENT   Final    Comment: Other  formed elements not mentioned in the report are not   present in the microscopic examination.          Assessment:     1. Mild intermittent asthma with acute exacerbation    2. Primary hypertension    3. Migraine with aura and without status migrainosus, not intractable    4. Prediabetes    5. Gastroesophageal reflux disease without esophagitis    6. Anemia, unspecified type    7. Keloid      Plan:   Mild intermittent asthma with acute exacerbation  -     albuterol (PROVENTIL HFA) 90 mcg/actuation inhaler; Inhale 2 puffs into the lungs every 6 (six) hours as needed for Wheezing. Rescue  Dispense: 18 g; Refill: 6    Primary hypertension  -     chlorthalidone (HYGROTEN) 25 MG Tab; Take 1 tablet (25 mg total) by mouth once daily.  Dispense: 90 tablet; Refill: 3  -     olmesartan (BENICAR) 40 MG tablet; Take 1 tablet (40 mg total) by mouth once daily.  Dispense: 90 tablet; Refill: 3  -bp stable and controlled on current medications.     Migraine with aura and without status migrainosus, not intractable  -stable on emgality.     Prediabetes    Gastroesophageal reflux disease without esophagitis    Anemia, unspecified type    Keloid  -     triamcinolone acetonide 0.1% (KENALOG) 0.1 % cream; Apply topically 2 (two) times daily. for 7 days  Dispense: 28.4 g; Refill: 0    -advised to follow up with derm for definitive treatment    Follow up in about 6 months (around 8/1/2024), or if symptoms worsen or fail to improve.

## 2024-02-14 DIAGNOSIS — K21.9 GASTROESOPHAGEAL REFLUX DISEASE WITHOUT ESOPHAGITIS: ICD-10-CM

## 2024-02-14 NOTE — TELEPHONE ENCOUNTER
No care due was identified.  [facility] Embedded Care Due Messages. Reference number: [id number].   2/14/2024 8:31:34 AM CST

## 2024-02-15 RX ORDER — PANTOPRAZOLE SODIUM 40 MG/1
40 TABLET, DELAYED RELEASE ORAL EVERY MORNING
Qty: 90 TABLET | Refills: 3 | Status: SHIPPED | OUTPATIENT
Start: 2024-02-15

## 2024-02-15 NOTE — TELEPHONE ENCOUNTER
Refill Decision Note   Emeka Miramontes  is requesting a refill authorization.  Brief Assessment and Rationale for Refill:  Approve     Medication Therapy Plan:         Comments:     Note composed:3:38 PM 02/15/2024

## 2024-04-11 ENCOUNTER — OFFICE VISIT (OUTPATIENT)
Dept: OBSTETRICS AND GYNECOLOGY | Facility: CLINIC | Age: 34
End: 2024-04-11
Payer: COMMERCIAL

## 2024-04-11 ENCOUNTER — LAB VISIT (OUTPATIENT)
Dept: LAB | Facility: HOSPITAL | Age: 34
End: 2024-04-11
Payer: COMMERCIAL

## 2024-04-11 VITALS
DIASTOLIC BLOOD PRESSURE: 64 MMHG | HEIGHT: 65 IN | BODY MASS INDEX: 31.96 KG/M2 | WEIGHT: 191.81 LBS | SYSTOLIC BLOOD PRESSURE: 120 MMHG

## 2024-04-11 DIAGNOSIS — R23.2 HOT FLASHES: ICD-10-CM

## 2024-04-11 DIAGNOSIS — Z11.3 SCREEN FOR STD (SEXUALLY TRANSMITTED DISEASE): Primary | ICD-10-CM

## 2024-04-11 DIAGNOSIS — Z11.3 SCREEN FOR STD (SEXUALLY TRANSMITTED DISEASE): ICD-10-CM

## 2024-04-11 LAB
ESTRADIOL SERPL-MCNC: 96 PG/ML
FSH SERPL-ACNC: 2.89 MIU/ML
HAV IGM SERPL QL IA: NORMAL
HBV CORE IGM SERPL QL IA: NORMAL
HBV SURFACE AG SERPL QL IA: NORMAL
HCV AB SERPL QL IA: NORMAL
HIV 1+2 AB+HIV1 P24 AG SERPL QL IA: NORMAL
TSH SERPL DL<=0.005 MIU/L-ACNC: 1.87 UIU/ML (ref 0.4–4)

## 2024-04-11 PROCEDURE — 4010F ACE/ARB THERAPY RXD/TAKEN: CPT | Mod: CPTII,S$GLB,,

## 2024-04-11 PROCEDURE — 87389 HIV-1 AG W/HIV-1&-2 AB AG IA: CPT

## 2024-04-11 PROCEDURE — 3078F DIAST BP <80 MM HG: CPT | Mod: CPTII,S$GLB,,

## 2024-04-11 PROCEDURE — 1159F MED LIST DOCD IN RCRD: CPT | Mod: CPTII,S$GLB,,

## 2024-04-11 PROCEDURE — 87591 N.GONORRHOEAE DNA AMP PROB: CPT

## 2024-04-11 PROCEDURE — 99213 OFFICE O/P EST LOW 20 MIN: CPT | Mod: S$GLB,,,

## 2024-04-11 PROCEDURE — 82670 ASSAY OF TOTAL ESTRADIOL: CPT

## 2024-04-11 PROCEDURE — 86592 SYPHILIS TEST NON-TREP QUAL: CPT

## 2024-04-11 PROCEDURE — 1160F RVW MEDS BY RX/DR IN RCRD: CPT | Mod: CPTII,S$GLB,,

## 2024-04-11 PROCEDURE — 84443 ASSAY THYROID STIM HORMONE: CPT

## 2024-04-11 PROCEDURE — 3008F BODY MASS INDEX DOCD: CPT | Mod: CPTII,S$GLB,,

## 2024-04-11 PROCEDURE — 99999 PR PBB SHADOW E&M-EST. PATIENT-LVL III: CPT | Mod: PBBFAC,,,

## 2024-04-11 PROCEDURE — 3074F SYST BP LT 130 MM HG: CPT | Mod: CPTII,S$GLB,,

## 2024-04-11 PROCEDURE — 80074 ACUTE HEPATITIS PANEL: CPT

## 2024-04-11 PROCEDURE — 83001 ASSAY OF GONADOTROPIN (FSH): CPT

## 2024-04-11 PROCEDURE — 36415 COLL VENOUS BLD VENIPUNCTURE: CPT

## 2024-04-11 RX ORDER — ATOGEPANT 60 MG/1
1 TABLET ORAL DAILY
COMMUNITY
Start: 2024-03-04

## 2024-04-11 NOTE — PROGRESS NOTES
Subjective:       Patient ID: Emeka Miramontes is a 33 y.o. female.    Chief Complaint:  STD CHECK      History of Present Illness  HPI  Vaginal Discharge and Irritation  Patient presents for vaginal discharge check. Sexual history reviewed with the patient. STD exposure: unsure of partners fidelity.  Previous history of STD:  none. Current symptoms include none.  Contraception: none.    Reports hot flashes multiple times a day over the last 2 months      GYN & OB History  Patient's last menstrual period was 2024.   Date of Last Pap: No result found    OB History    Para Term  AB Living   0 0 0 0 0 0   SAB IAB Ectopic Multiple Live Births   0 0 0 0         Review of Systems  Review of Systems   Constitutional: Negative.    HENT: Negative.     Eyes: Negative.    Respiratory: Negative.     Cardiovascular: Negative.    Gastrointestinal: Negative.    Genitourinary: Negative.    Musculoskeletal: Negative.    Integumentary:  Negative.   Neurological: Negative.    Hematological: Negative.    Psychiatric/Behavioral: Negative.     All other systems reviewed and are negative.  Breast: negative.            Objective:      Physical Exam:   Constitutional: She is oriented to person, place, and time. She appears well-developed and well-nourished.    HENT:   Head: Normocephalic and atraumatic.    Eyes: Pupils are equal, round, and reactive to light. Conjunctivae and EOM are normal.     Cardiovascular:  Normal rate, regular rhythm and normal heart sounds.             Pulmonary/Chest: Effort normal.        Abdominal: Soft. There is no abdominal tenderness.     Genitourinary:    Genitourinary Comments: Patient declined              Musculoskeletal: Normal range of motion and moves all extremeties.       Neurological: She is alert and oriented to person, place, and time.    Skin: Skin is warm and dry. She is not diaphoretic.    Psychiatric: She has a normal mood and affect. Her behavior is normal. Judgment and  thought content normal.             Assessment:        1. Screen for STD (sexually transmitted disease)    2. Hot flashes               Plan:   Continue annual well woman exam.    Diagnosis and orders this visit:  Screen for STD (sexually transmitted disease)  -     C. trachomatis/N. gonorrhoeae by AMP DNA  -     RPR; Future; Expected date: 04/11/2024  -     HIV 1/2 Ag/Ab (4th Gen); Future; Expected date: 04/11/2024  -     Hepatitis Panel, Acute; Future; Expected date: 04/11/2024    Hot flashes  -     TSH; Future; Expected date: 04/11/2024  -     Follicle Stimulating Hormone; Future; Expected date: 04/11/2024  -     Estradiol; Future; Expected date: 04/11/2024      Dari Lowe, NP

## 2024-04-12 LAB — RPR SER QL: NORMAL

## 2024-04-14 LAB
C TRACH DNA SPEC QL NAA+PROBE: NOT DETECTED
N GONORRHOEA DNA SPEC QL NAA+PROBE: NOT DETECTED

## 2024-05-16 ENCOUNTER — OFFICE VISIT (OUTPATIENT)
Dept: FAMILY MEDICINE | Facility: CLINIC | Age: 34
End: 2024-05-16
Payer: COMMERCIAL

## 2024-05-16 VITALS
DIASTOLIC BLOOD PRESSURE: 70 MMHG | RESPIRATION RATE: 18 BRPM | OXYGEN SATURATION: 99 % | TEMPERATURE: 98 F | SYSTOLIC BLOOD PRESSURE: 112 MMHG | BODY MASS INDEX: 31.77 KG/M2 | HEART RATE: 86 BPM | HEIGHT: 65 IN | WEIGHT: 190.69 LBS

## 2024-05-16 DIAGNOSIS — F32.A DEPRESSION, UNSPECIFIED DEPRESSION TYPE: Primary | ICD-10-CM

## 2024-05-16 DIAGNOSIS — F41.9 ANXIETY: ICD-10-CM

## 2024-05-16 DIAGNOSIS — Z76.89 ESTABLISHING CARE WITH NEW DOCTOR, ENCOUNTER FOR: ICD-10-CM

## 2024-05-16 PROCEDURE — 1160F RVW MEDS BY RX/DR IN RCRD: CPT | Mod: CPTII,S$GLB,, | Performed by: NURSE PRACTITIONER

## 2024-05-16 PROCEDURE — 3078F DIAST BP <80 MM HG: CPT | Mod: CPTII,S$GLB,, | Performed by: NURSE PRACTITIONER

## 2024-05-16 PROCEDURE — 4010F ACE/ARB THERAPY RXD/TAKEN: CPT | Mod: CPTII,S$GLB,, | Performed by: NURSE PRACTITIONER

## 2024-05-16 PROCEDURE — 99999 PR PBB SHADOW E&M-EST. PATIENT-LVL IV: CPT | Mod: PBBFAC,,, | Performed by: NURSE PRACTITIONER

## 2024-05-16 PROCEDURE — 99214 OFFICE O/P EST MOD 30 MIN: CPT | Mod: S$GLB,,, | Performed by: NURSE PRACTITIONER

## 2024-05-16 PROCEDURE — 1159F MED LIST DOCD IN RCRD: CPT | Mod: CPTII,S$GLB,, | Performed by: NURSE PRACTITIONER

## 2024-05-16 PROCEDURE — 3008F BODY MASS INDEX DOCD: CPT | Mod: CPTII,S$GLB,, | Performed by: NURSE PRACTITIONER

## 2024-05-16 PROCEDURE — 3074F SYST BP LT 130 MM HG: CPT | Mod: CPTII,S$GLB,, | Performed by: NURSE PRACTITIONER

## 2024-05-16 RX ORDER — ESCITALOPRAM OXALATE 5 MG/1
5 TABLET ORAL DAILY
Qty: 30 TABLET | Refills: 3 | Status: SHIPPED | OUTPATIENT
Start: 2024-05-16 | End: 2024-06-13

## 2024-05-16 RX ORDER — BUSPIRONE HYDROCHLORIDE 15 MG/1
15 TABLET ORAL 2 TIMES DAILY
Qty: 60 TABLET | Refills: 3 | Status: SHIPPED | OUTPATIENT
Start: 2024-05-16

## 2024-05-16 NOTE — PROGRESS NOTES
Artursamuel YUKI Miramontes  05/17/2024  [id number]    Jelena Chaney MD  Patient Care Team:  Jelena Chaney MD as PCP - General (Family Medicine)  Amberly Waggoner NP as Nurse Practitioner (Family Medicine)          Visit Type:an urgent visit for a new problem    Chief Complaint:  Chief Complaint   Patient presents with    eca       History of Present Illness:    33 year old female presents establishing care with Dr. Chaney.  She presents today requesting a refill for buspar.   Reports she has had more anxiety over the past month. Mother's day was difficult and she was not bale to leave the house. Admits her mother passed and she has been having a hard time coping.    History:  Past Medical History:   Diagnosis Date    Anxiety     Asthma     Bilious vomiting with nausea 5/30/2023    Depression     Epigastric pain 5/30/2023    Present since 04/2023    Hypertension     unsure if she has HTN; started this due to migraine medication that can cause her BP to go up    Migraine with aura and without status migrainosus, not intractable 5/4/2023    Migraines      Past Surgical History:   Procedure Laterality Date    ESOPHAGOGASTRODUODENOSCOPY N/A 5/30/2023    Procedure: EGD (ESOPHAGOGASTRODUODENOSCOPY);  Surgeon: Rosario Stock MD;  Location: [facility] ENDO;  Service: Endoscopy;  Laterality: N/A;    KNEE SURGERY Right     ROBOT-ASSISTED CHOLECYSTECTOMY USING DA PATRICK XI N/A 7/26/2023    Procedure: XI ROBOTIC CHOLECYSTECTOMY;  Surgeon: Moise Ojeda MD;  Location: [facility] OR;  Service: General;  Laterality: N/A;    ROTATOR CUFF REPAIR       Family History   Problem Relation Name Age of Onset    Hypertension Mother      Breast cancer Mother  68    Ovarian cancer Neg Hx       Social History     Socioeconomic History    Marital status: Single   Tobacco Use    Smoking status: Never    Smokeless tobacco: Never   Substance and Sexual Activity    Alcohol use: Yes    Drug use: No    Sexual activity: Not Currently     Social  Determinants of Health     Financial Resource Strain: Low Risk  (12/7/2023)    Overall Financial Resource Strain (CARDIA)     Difficulty of Paying Living Expenses: Not hard at all   Food Insecurity: No Food Insecurity (12/7/2023)    Hunger Vital Sign     Worried About Running Out of Food in the Last Year: Never true     Ran Out of Food in the Last Year: Never true   Transportation Needs: No Transportation Needs (12/7/2023)    PRAPARE - Transportation     Lack of Transportation (Medical): No     Lack of Transportation (Non-Medical): No   Physical Activity: Insufficiently Active (12/7/2023)    Exercise Vital Sign     Days of Exercise per Week: 2 days     Minutes of Exercise per Session: 40 min   Stress: No Stress Concern Present (12/7/2023)    [employer] - Occupational Stress Questionnaire     Feeling of Stress : Not at all   Housing Stability: Unknown (12/7/2023)    Housing Stability Vital Sign     Unable to Pay for Housing in the Last Year: No     Unstable Housing in the Last Year: No     Patient Active Problem List   Diagnosis    Dysmenorrhea    Primary hypertension    Migraine with aura and without status migrainosus, not intractable    Weight loss, unintentional    Prediabetes    Elevated serum protein level    At risk for medication nonadherence    Gastroesophageal reflux disease without esophagitis    Anemia     Review of patient's allergies indicates:   Allergen Reactions    Aspirin Hives       The following were reviewed at this visit: active problem list, medication list, allergies, family history, social history, and health maintenance.    Medications:  Current Outpatient Medications on File Prior to Visit   Medication Sig Dispense Refill    albuterol (PROVENTIL HFA) 90 mcg/actuation inhaler Inhale 2 puffs into the lungs every 6 (six) hours as needed for Wheezing. Rescue 18 g 6    ALBUTEROL INHL Inhale into the lungs.      amitriptyline (ELAVIL) 25 MG tablet every evening.       atogepant (QULIPTA) 60 mg Tab Take 1 tablet by mouth once daily.      chlorthalidone (HYGROTEN) 25 MG Tab Take 1 tablet (25 mg total) by mouth once daily. 90 tablet 3    galcanezumab-gnlm (EMGALITY PEN) 120 mg/mL PnIj 1 mL.      montelukast (SINGULAIR) 10 mg tablet Take 1 tablet by mouth once daily.      multivitamin-Ca-iron-minerals 18-0.4 mg Tab Take 1 tablet by mouth once daily.      olmesartan (BENICAR) 40 MG tablet Take 1 tablet (40 mg total) by mouth once daily. 90 tablet 3    ondansetron (ZOFRAN) 4 MG tablet       pantoprazole (PROTONIX) 40 MG tablet TAKE 1 TABLET(40 MG) BY MOUTH EVERY MORNING 90 tablet 3    UBROGEPANT 100 mg tablet Take 1 tablet   as needed take one tablet at the onset of headache. Can repeat in 2 hours if needed. Max 200 mg/day      verapamiL (CALAN) 40 MG Tab verapamil 40 mg tablet   TAKE 1 TABLET BY MOUTH TWICE DAILY      triamcinolone acetonide 0.1% (KENALOG) 0.1 % cream Apply topically 2 (two) times daily. for 7 days 28.4 g 0     No current facility-administered medications on file prior to visit.       Medications have been reviewed and reconciled with patient at this visit.  Barriers to medications reviewed with patient.    Adverse reactions to current medications reviewed with patient..    Over the counter medications reviewed and reconciled with patient.    Exam:  Wt Readings from Last 3 Encounters:   05/16/24 86.5 kg (190 lb 11.2 oz)   04/11/24 87 kg (191 lb 12.8 oz)   02/01/24 90.6 kg (199 lb 11.8 oz)     Temp Readings from Last 3 Encounters:   05/16/24 98.1 °F (36.7 °C) (Tympanic)   02/01/24 96.5 °F (35.8 °C) (Tympanic)   12/07/23 97.2 °F (36.2 °C)     BP Readings from Last 3 Encounters:   05/16/24 112/70   04/11/24 120/64   02/01/24 118/70     Pulse Readings from Last 3 Encounters:   05/16/24 86   12/07/23 82   08/16/23 73     Body mass index is 31.73 kg/m².      Review of Systems   Constitutional:  Negative for fever.   Respiratory:  Negative for cough, shortness of breath and  wheezing.    Cardiovascular:  Negative for chest pain and palpitations.   Gastrointestinal:  Negative for nausea.   Neurological:  Negative for speech change, weakness and headaches.   Psychiatric/Behavioral:  Positive for depression. The patient is nervous/anxious.    All other systems reviewed and are negative.    Physical Exam  Vitals and nursing note reviewed.   Constitutional:       Appearance: Normal appearance. She is obese.   HENT:      Head: Normocephalic and atraumatic.      Right Ear: Tympanic membrane, ear canal and external ear normal.      Left Ear: Tympanic membrane, ear canal and external ear normal.      Nose: Nose normal.      Mouth/Throat:      Mouth: Mucous membranes are moist.      Pharynx: Oropharynx is clear.   Eyes:      Extraocular Movements: Extraocular movements intact.      Conjunctiva/sclera: Conjunctivae normal.      Pupils: Pupils are equal, round, and reactive to light.   Cardiovascular:      Rate and Rhythm: Normal rate and regular rhythm.      Pulses: Normal pulses.      Heart sounds: Normal heart sounds.   Pulmonary:      Effort: Pulmonary effort is normal.      Breath sounds: Normal breath sounds.   Abdominal:      General: Bowel sounds are normal.      Palpations: Abdomen is soft.   Musculoskeletal:         General: Normal range of motion.      Cervical back: Normal range of motion and neck supple.   Skin:     General: Skin is warm and dry.      Capillary Refill: Capillary refill takes less than 2 seconds.   Neurological:      General: No focal deficit present.      Mental Status: She is alert and oriented to person, place, and time.   Psychiatric:         Attention and Perception: Attention and perception normal.         Mood and Affect: Mood is depressed. Affect is tearful.         Speech: Speech normal.         Behavior: Behavior normal. Behavior is cooperative.         Thought Content: Thought content normal.         Cognition and Memory: Cognition and memory normal.          "Judgment: Judgment normal.         Laboratory Reviewed ({Yes)  Lab Results   Component Value Date    WBC 6.91 10/03/2023    HGB 13.2 12/07/2023    HCT 40.6 12/07/2023     10/03/2023    CHOL 182 05/04/2023    TRIG 60 05/04/2023    HDL 74 05/04/2023    ALT 24 10/03/2023    AST 23 10/03/2023     10/03/2023    K 4.4 10/03/2023     10/03/2023    CREATININE 0.8 10/03/2023    BUN 11 10/03/2023    CO2 26 10/03/2023    TSH 1.869 04/11/2024    HGBA1C 5.7 (H) 05/04/2023       Emeka Thomas" was seen today for eca.    Diagnoses and all orders for this visit:    Depression, unspecified depression type  -     EScitalopram oxalate (LEXAPRO) 5 MG Tab; Take 1 tablet (5 mg total) by mouth once daily.    Anxiety  -     EScitalopram oxalate (LEXAPRO) 5 MG Tab; Take 1 tablet (5 mg total) by mouth once daily.  -     busPIRone (BUSPAR) 15 MG tablet; Take 1 tablet (15 mg total) by mouth 2 (two) times daily.    Establishing care with new doctor, encounter for          Plan   F/ u Dr. Echevarria   2. Start medications prescribed today   3. F/u 4 weeks for medication check      Care Plan/Goals: Reviewed    Goals    None     Emeka Thomas" was seen today for eca.    Diagnoses and all orders for this visit:    Depression, unspecified depression type  -     EScitalopram oxalate (LEXAPRO) 5 MG Tab; Take 1 tablet (5 mg total) by mouth once daily.    Anxiety  -     EScitalopram oxalate (LEXAPRO) 5 MG Tab; Take 1 tablet (5 mg total) by mouth once daily.  -     busPIRone (BUSPAR) 15 MG tablet; Take 1 tablet (15 mg total) by mouth 2 (two) times daily.    Establishing care with new doctor, encounter for         Follow up: Follow up in about 4 weeks (around 6/13/2024) for with Dr. Echevarria psychiatry.    After visit summary was printed and given to patient upon discharge today.  Patient goals and care plan are included in After Visit Summary.    "

## 2024-05-21 ENCOUNTER — TELEPHONE (OUTPATIENT)
Dept: FAMILY MEDICINE | Facility: CLINIC | Age: 34
End: 2024-05-21
Payer: COMMERCIAL

## 2024-05-21 NOTE — TELEPHONE ENCOUNTER
----- Message from Lizet Alan MA sent at 5/21/2024  8:17 AM CDT -----  Contact: Emeka  Patient called in regards to her paperwork needing to be re-faxed. She stated that it was faxed over side ways and her employer can not read the forms.     Thanks,  Lizet  ----- Message -----  From: Alanna Pride  Sent: 5/21/2024   8:01 AM CDT  To: Shiv Latif Staff    Pt is calling in regards to picking up the new pt referral and also upload in portal.please call back at [phone]         Thanks  J

## 2024-06-13 ENCOUNTER — OFFICE VISIT (OUTPATIENT)
Dept: FAMILY MEDICINE | Facility: CLINIC | Age: 34
End: 2024-06-13
Payer: COMMERCIAL

## 2024-06-13 VITALS
OXYGEN SATURATION: 96 % | WEIGHT: 195.31 LBS | BODY MASS INDEX: 32.54 KG/M2 | DIASTOLIC BLOOD PRESSURE: 80 MMHG | HEIGHT: 65 IN | SYSTOLIC BLOOD PRESSURE: 112 MMHG | HEART RATE: 96 BPM | TEMPERATURE: 97 F

## 2024-06-13 DIAGNOSIS — F41.9 ANXIETY: ICD-10-CM

## 2024-06-13 DIAGNOSIS — F32.A DEPRESSION, UNSPECIFIED DEPRESSION TYPE: Primary | ICD-10-CM

## 2024-06-13 DIAGNOSIS — Z09 FOLLOW-UP EXAM: ICD-10-CM

## 2024-06-13 PROCEDURE — 99999 PR PBB SHADOW E&M-EST. PATIENT-LVL IV: CPT | Mod: PBBFAC,,, | Performed by: NURSE PRACTITIONER

## 2024-06-13 PROCEDURE — 3008F BODY MASS INDEX DOCD: CPT | Mod: CPTII,S$GLB,, | Performed by: NURSE PRACTITIONER

## 2024-06-13 PROCEDURE — 1160F RVW MEDS BY RX/DR IN RCRD: CPT | Mod: CPTII,S$GLB,, | Performed by: NURSE PRACTITIONER

## 2024-06-13 PROCEDURE — 1159F MED LIST DOCD IN RCRD: CPT | Mod: CPTII,S$GLB,, | Performed by: NURSE PRACTITIONER

## 2024-06-13 PROCEDURE — 99214 OFFICE O/P EST MOD 30 MIN: CPT | Mod: S$GLB,,, | Performed by: NURSE PRACTITIONER

## 2024-06-13 PROCEDURE — 4010F ACE/ARB THERAPY RXD/TAKEN: CPT | Mod: CPTII,S$GLB,, | Performed by: NURSE PRACTITIONER

## 2024-06-13 PROCEDURE — 3074F SYST BP LT 130 MM HG: CPT | Mod: CPTII,S$GLB,, | Performed by: NURSE PRACTITIONER

## 2024-06-13 PROCEDURE — 3079F DIAST BP 80-89 MM HG: CPT | Mod: CPTII,S$GLB,, | Performed by: NURSE PRACTITIONER

## 2024-06-13 RX ORDER — ESCITALOPRAM OXALATE 10 MG/1
10 TABLET ORAL DAILY
Qty: 90 TABLET | Refills: 3 | Status: SHIPPED | OUTPATIENT
Start: 2024-06-13 | End: 2025-06-13

## 2024-06-13 NOTE — PROGRESS NOTES
Emeka Miramontes  06/13/2024  [id number]    Jelena Chaney MD  Patient Care Team:  Jelena Chaney MD as PCP - General (Family Medicine)  Amberly Waggoner NP as Nurse Practitioner (Family Medicine)          Visit Type:a scheduled routine follow-up visit    Chief Complaint:  Chief Complaint   Patient presents with    Follow-up       History of Present Illness:   33 year old female presents for follow up after starting medication for depression and anxiety.   Reports her first appt with Dr. Echevarria psychiatry 6/19/24@ 12:30   States her depression has improvement she continues to have mild anxiety.    History:  Past Medical History:   Diagnosis Date    Anxiety     Asthma     Bilious vomiting with nausea 5/30/2023    Depression     Epigastric pain 5/30/2023    Present since 04/2023    Hypertension     unsure if she has HTN; started this due to migraine medication that can cause her BP to go up    Migraine with aura and without status migrainosus, not intractable 5/4/2023    Migraines      Past Surgical History:   Procedure Laterality Date    ESOPHAGOGASTRODUODENOSCOPY N/A 5/30/2023    Procedure: EGD (ESOPHAGOGASTRODUODENOSCOPY);  Surgeon: oRsario Stock MD;  Location: [facility] ENDO;  Service: Endoscopy;  Laterality: N/A;    KNEE SURGERY Right     ROBOT-ASSISTED CHOLECYSTECTOMY USING DA PATRICK XI N/A 7/26/2023    Procedure: XI ROBOTIC CHOLECYSTECTOMY;  Surgeon: Moise Ojeda MD;  Location: [facility] OR;  Service: General;  Laterality: N/A;    ROTATOR CUFF REPAIR       Family History   Problem Relation Name Age of Onset    Hypertension Mother      Breast cancer Mother  68    Ovarian cancer Neg Hx       Social History     Socioeconomic History    Marital status: Single   Tobacco Use    Smoking status: Never    Smokeless tobacco: Never   Substance and Sexual Activity    Alcohol use: Yes    Drug use: No    Sexual activity: Not Currently     Social Determinants of Health     Financial Resource Strain: Low Risk   (12/7/2023)    Overall Financial Resource Strain (CARDIA)     Difficulty of Paying Living Expenses: Not hard at all   Food Insecurity: No Food Insecurity (12/7/2023)    Hunger Vital Sign     Worried About Running Out of Food in the Last Year: Never true     Ran Out of Food in the Last Year: Never true   Transportation Needs: No Transportation Needs (12/7/2023)    PRAPARE - Transportation     Lack of Transportation (Medical): No     Lack of Transportation (Non-Medical): No   Physical Activity: Insufficiently Active (12/7/2023)    Exercise Vital Sign     Days of Exercise per Week: 2 days     Minutes of Exercise per Session: 40 min   Stress: No Stress Concern Present (12/7/2023)    [employer] - Occupational Stress Questionnaire     Feeling of Stress : Not at all   Housing Stability: Unknown (12/7/2023)    Housing Stability Vital Sign     Unable to Pay for Housing in the Last Year: No     Unstable Housing in the Last Year: No     Patient Active Problem List   Diagnosis    Dysmenorrhea    Primary hypertension    Migraine with aura and without status migrainosus, not intractable    Weight loss, unintentional    Prediabetes    Elevated serum protein level    At risk for medication nonadherence    Gastroesophageal reflux disease without esophagitis    Anemia     Review of patient's allergies indicates:   Allergen Reactions    Aspirin Hives, Other (See Comments) and Rash       The following were reviewed at this visit: active problem list, medication list, allergies, family history, social history, and health maintenance.    Medications:  Current Outpatient Medications on File Prior to Visit   Medication Sig Dispense Refill    albuterol (PROVENTIL HFA) 90 mcg/actuation inhaler Inhale 2 puffs into the lungs every 6 (six) hours as needed for Wheezing. Rescue 18 g 6    ALBUTEROL INHL Inhale into the lungs.      amitriptyline (ELAVIL) 25 MG tablet every evening.      atogepant (QULIPTA) 60 mg Tab  Take 1 tablet by mouth once daily.      busPIRone (BUSPAR) 15 MG tablet Take 1 tablet (15 mg total) by mouth 2 (two) times daily. 60 tablet 3    chlorthalidone (HYGROTEN) 25 MG Tab Take 1 tablet (25 mg total) by mouth once daily. 90 tablet 3    EScitalopram oxalate (LEXAPRO) 5 MG Tab Take 1 tablet (5 mg total) by mouth once daily. 30 tablet 3    galcanezumab-gnlm (EMGALITY PEN) 120 mg/mL PnIj 1 mL.      montelukast (SINGULAIR) 10 mg tablet Take 1 tablet by mouth once daily.      multivitamin-Ca-iron-minerals 18-0.4 mg Tab Take 1 tablet by mouth once daily.      olmesartan (BENICAR) 40 MG tablet Take 1 tablet (40 mg total) by mouth once daily. 90 tablet 3    ondansetron (ZOFRAN) 4 MG tablet       pantoprazole (PROTONIX) 40 MG tablet TAKE 1 TABLET(40 MG) BY MOUTH EVERY MORNING 90 tablet 3    triamcinolone acetonide 0.1% (KENALOG) 0.1 % cream Apply topically 2 (two) times daily. for 7 days 28.4 g 0    UBROGEPANT 100 mg tablet Take 1 tablet   as needed take one tablet at the onset of headache. Can repeat in 2 hours if needed. Max 200 mg/day      verapamiL (CALAN) 40 MG Tab verapamil 40 mg tablet   TAKE 1 TABLET BY MOUTH TWICE DAILY       No current facility-administered medications on file prior to visit.       Medications have been reviewed and reconciled with patient at this visit.  Barriers to medications reviewed with patient.    Adverse reactions to current medications reviewed with patient..    Over the counter medications reviewed and reconciled with patient.    Exam:  Wt Readings from Last 3 Encounters:   06/13/24 88.6 kg (195 lb 5.2 oz)   05/16/24 86.5 kg (190 lb 11.2 oz)   04/11/24 87 kg (191 lb 12.8 oz)     Temp Readings from Last 3 Encounters:   06/13/24 96.7 °F (35.9 °C) (Temporal)   05/16/24 98.1 °F (36.7 °C) (Tympanic)   02/01/24 96.5 °F (35.8 °C) (Tympanic)     BP Readings from Last 3 Encounters:   06/13/24 112/80   05/16/24 112/70   04/11/24 120/64     Pulse Readings from Last 3 Encounters:   06/13/24  96   05/16/24 86   12/07/23 82     Body mass index is 32.5 kg/m².      Review of Systems   Constitutional:  Negative for fever.   Respiratory:  Negative for cough, shortness of breath and wheezing.    Cardiovascular:  Negative for chest pain and palpitations.   Gastrointestinal:  Negative for nausea.   Neurological:  Negative for speech change, weakness and headaches.   Psychiatric/Behavioral:  Positive for depression. The patient is nervous/anxious.    All other systems reviewed and are negative.    Physical Exam  Vitals and nursing note reviewed.   Constitutional:       Appearance: Normal appearance. She is obese.   HENT:      Head: Normocephalic and atraumatic.      Right Ear: Tympanic membrane, ear canal and external ear normal.      Left Ear: Tympanic membrane, ear canal and external ear normal.      Nose: Nose normal.      Mouth/Throat:      Mouth: Mucous membranes are moist.      Pharynx: Oropharynx is clear.   Eyes:      Extraocular Movements: Extraocular movements intact.      Conjunctiva/sclera: Conjunctivae normal.      Pupils: Pupils are equal, round, and reactive to light.   Cardiovascular:      Rate and Rhythm: Normal rate and regular rhythm.      Pulses: Normal pulses.      Heart sounds: Normal heart sounds.   Pulmonary:      Effort: Pulmonary effort is normal.      Breath sounds: Normal breath sounds.   Abdominal:      General: Bowel sounds are normal.      Palpations: Abdomen is soft.   Musculoskeletal:         General: Normal range of motion.      Cervical back: Normal range of motion and neck supple.   Skin:     General: Skin is warm and dry.      Capillary Refill: Capillary refill takes less than 2 seconds.   Neurological:      General: No focal deficit present.      Mental Status: She is alert and oriented to person, place, and time.   Psychiatric:         Mood and Affect: Mood normal.         Behavior: Behavior normal.         Thought Content: Thought content normal.         Judgment: Judgment  "normal.         Laboratory Reviewed ({Yes)  Lab Results   Component Value Date    WBC 6.91 10/03/2023    HGB 13.2 12/07/2023    HCT 40.6 12/07/2023     10/03/2023    CHOL 182 05/04/2023    TRIG 60 05/04/2023    HDL 74 05/04/2023    ALT 24 10/03/2023    AST 23 10/03/2023     10/03/2023    K 4.4 10/03/2023     10/03/2023    CREATININE 0.8 10/03/2023    BUN 11 10/03/2023    CO2 26 10/03/2023    TSH 1.869 04/11/2024    HGBA1C 5.7 (H) 05/04/2023       Emeka Thomas" was seen today for follow-up.    Diagnoses and all orders for this visit:    Depression, unspecified depression type    Anxiety    Follow-up exam        Plan  Keep appt with Dr. Echevarria psychiatry  The current medical regimen is effective;  continue present plan and medications.          Care Plan/Goals: Reviewed    Goals    None     Emeka Thomas" was seen today for follow-up.    Diagnoses and all orders for this visit:    Depression, unspecified depression type    Anxiety    Follow-up exam          Follow up: Follow up in about 6 months (around 12/13/2024) for with  for 6 month follow up.    After visit summary was printed and given to patient upon discharge today.  Patient goals and care plan are included in After Visit Summary.    "

## 2024-08-15 ENCOUNTER — OFFICE VISIT (OUTPATIENT)
Dept: INTERNAL MEDICINE | Facility: CLINIC | Age: 34
End: 2024-08-15
Payer: COMMERCIAL

## 2024-08-15 ENCOUNTER — LAB VISIT (OUTPATIENT)
Dept: LAB | Facility: HOSPITAL | Age: 34
End: 2024-08-15
Payer: COMMERCIAL

## 2024-08-15 ENCOUNTER — HOSPITAL ENCOUNTER (OUTPATIENT)
Dept: RADIOLOGY | Facility: HOSPITAL | Age: 34
Discharge: HOME | End: 2024-08-15
Attending: NURSE PRACTITIONER
Payer: COMMERCIAL

## 2024-08-15 VITALS
OXYGEN SATURATION: 98 % | WEIGHT: 197.56 LBS | HEART RATE: 88 BPM | HEIGHT: 65 IN | TEMPERATURE: 98 F | SYSTOLIC BLOOD PRESSURE: 122 MMHG | DIASTOLIC BLOOD PRESSURE: 84 MMHG | BODY MASS INDEX: 32.91 KG/M2

## 2024-08-15 DIAGNOSIS — R10.9 ABDOMINAL PAIN, UNSPECIFIED ABDOMINAL LOCATION: Primary | ICD-10-CM

## 2024-08-15 DIAGNOSIS — K21.9 GASTROESOPHAGEAL REFLUX DISEASE WITHOUT ESOPHAGITIS: ICD-10-CM

## 2024-08-15 DIAGNOSIS — R73.03 PREDIABETES: ICD-10-CM

## 2024-08-15 DIAGNOSIS — J45.21 MILD INTERMITTENT ASTHMA WITH ACUTE EXACERBATION: ICD-10-CM

## 2024-08-15 DIAGNOSIS — R10.9 ABDOMINAL PAIN, UNSPECIFIED ABDOMINAL LOCATION: ICD-10-CM

## 2024-08-15 DIAGNOSIS — D64.9 ANEMIA, UNSPECIFIED TYPE: ICD-10-CM

## 2024-08-15 DIAGNOSIS — I10 PRIMARY HYPERTENSION: Chronic | ICD-10-CM

## 2024-08-15 DIAGNOSIS — R04.0 EPISTAXIS: ICD-10-CM

## 2024-08-15 LAB
BACTERIA #/AREA URNS HPF: ABNORMAL /HPF
BILIRUB UR QL STRIP: NEGATIVE
CLARITY UR: ABNORMAL
COLOR UR: YELLOW
GLUCOSE UR QL STRIP: NEGATIVE
HGB UR QL STRIP: NEGATIVE
KETONES UR QL STRIP: NEGATIVE
LEUKOCYTE ESTERASE UR QL STRIP: ABNORMAL
MICROSCOPIC COMMENT: ABNORMAL
NITRITE UR QL STRIP: NEGATIVE
PH UR STRIP: 6 [PH] (ref 5–8)
PROT UR QL STRIP: NEGATIVE
RBC #/AREA URNS HPF: 2 /HPF (ref 0–4)
SP GR UR STRIP: 1.01 (ref 1–1.03)
SQUAMOUS #/AREA URNS HPF: 15 /HPF
URN SPEC COLLECT METH UR: ABNORMAL
WBC #/AREA URNS HPF: 5 /HPF (ref 0–5)

## 2024-08-15 PROCEDURE — 4010F ACE/ARB THERAPY RXD/TAKEN: CPT | Mod: CPTII,S$GLB,, | Performed by: NURSE PRACTITIONER

## 2024-08-15 PROCEDURE — 74019 RADEX ABDOMEN 2 VIEWS: CPT | Mod: TC

## 2024-08-15 PROCEDURE — 74019 RADEX ABDOMEN 2 VIEWS: CPT | Mod: 26,,, | Performed by: RADIOLOGY

## 2024-08-15 PROCEDURE — 3044F HG A1C LEVEL LT 7.0%: CPT | Mod: CPTII,S$GLB,, | Performed by: NURSE PRACTITIONER

## 2024-08-15 PROCEDURE — 3079F DIAST BP 80-89 MM HG: CPT | Mod: CPTII,S$GLB,, | Performed by: NURSE PRACTITIONER

## 2024-08-15 PROCEDURE — 99999 PR PBB SHADOW E&M-EST. PATIENT-LVL IV: CPT | Mod: PBBFAC,,, | Performed by: NURSE PRACTITIONER

## 2024-08-15 PROCEDURE — 81000 URINALYSIS NONAUTO W/SCOPE: CPT | Performed by: NURSE PRACTITIONER

## 2024-08-15 PROCEDURE — 3074F SYST BP LT 130 MM HG: CPT | Mod: CPTII,S$GLB,, | Performed by: NURSE PRACTITIONER

## 2024-08-15 PROCEDURE — 1159F MED LIST DOCD IN RCRD: CPT | Mod: CPTII,S$GLB,, | Performed by: NURSE PRACTITIONER

## 2024-08-15 PROCEDURE — 3008F BODY MASS INDEX DOCD: CPT | Mod: CPTII,S$GLB,, | Performed by: NURSE PRACTITIONER

## 2024-08-15 PROCEDURE — 99214 OFFICE O/P EST MOD 30 MIN: CPT | Mod: S$GLB,,, | Performed by: NURSE PRACTITIONER

## 2024-08-15 RX ORDER — MONTELUKAST SODIUM 10 MG/1
10 TABLET ORAL NIGHTLY
Qty: 90 TABLET | Refills: 2 | Status: SHIPPED | OUTPATIENT
Start: 2024-08-15

## 2024-08-15 NOTE — PROGRESS NOTES
Subjective:      Patient ID: Emeka Miramontes is a 34 y.o. female.    Chief Complaint: Follow-up (Pt present today for f/u with c/o pain with tightness under rt breast with SOB )    History of Present Illness    CHIEF COMPLAINT:  Emeka presents today for follow-up.    RIGHT UPPER QUADRANT PAIN:  She reports right upper quadrant pain that started about a month ago, described as feeling like a muscle spasm. Last week, the pain reached a severity of 8/10. She denies any associated nausea or vomiting with the pain episodes.    GASTROINTESTINAL:  She underwent gallbladder removal surgery last year. The incisions have started swelling and looking like keloids. She applies a prescribed topical cream when she notices swelling. She experiences loose, watery bowel movements daily despite efforts to consume foods intended to solidify her stool. She acknowledges this may be related to her gallbladder removal and avoids fatty foods.    ASTHMA:  She has asthma managed with Singulair (taken daily at night) and an albuterol inhaler as needed. She prefers Singulair over the inhaler but has been out of it for a while due to refill difficulties. She denies recent asthma exacerbations.    PSYCHIATRIC MEDICATIONS:  She has switched from buspirone to Lexapro as prescribed by her psychiatrist. She has discontinued amitriptyline.    MIGRAINES:  She has been prescribed Qlipta for migraine management but has not filled it yet. She uses Zofran for associated nausea and dizziness. She no longer uses Emgality or amitriptyline for migraine treatment.    KNEE INJURY AND RECOVERY:  She broke her knee while camping, resulting in three surgeries with the last one in October of last year. She reports significant improvement, now able to walk, jump, and run without pain. She was on crutches for a year post-injury, contributing to weight gain. She has completed physical therapy, including aqua therapy.    ANEMIA:  She reports improvement since  last visit. She has been taking a multivitamin when available but has not taken any for about a month. She has been compensating by consuming more iron-rich foods.    GERD:  She reports occasional symptoms, occurring very seldomly. She denies frequent burning sensation in the chest or vomiting and is unable to identify specific precipitating factors.    HYPERTENSION:  She reports significant improvement in her blood pressure.    NOSEBLEEDS:  She has experienced nosebleeds throughout the summer, with approximately 4 episodes in the past month. They typically last 10-15 minutes and occur exclusively from the left nostril. She denies nose picking or forceful nose blowing as potential causes.    SKIN CONDITIONS:  She has a history of eczema and uses a topical cream as needed.    SURGICAL HISTORY:  She has undergone gallbladder removal and three knee surgeries.    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 Her knee injury significantly impacted her physical activity and led to weight gain.    FAMILY HISTORY:  She experienced muscle spasms in her younger years, severe enough to require ER visits for X-rays and pain relief injections.    CURRENT MEDICATIONS:  Lexapro, Singulair, albuterol inhaler, Zofran, tramadol (  topical cream), olmesartan, chlorthalidone, and pantoprazole.    ALLERGIES:  She denies any known allergies.  ROS:  General: -fever, -chills, -fatigue, -weight gain, -weight loss  Eyes: -vision changes, -redness, -discharge  ENT: -ear pain, -nasal congestion, -sore throat, +nosebleeds  Cardiovascular: -chest pain, -palpitations, -lower extremity edema  Respiratory: -cough, -shortness of breath  Gastrointestinal: -abdominal pain, -nausea, -vomiting, -diarrhea, -constipation, -blood in stool, +change in bowel habits  Genitourinary: -dysuria, -hematuria, -frequency  Musculoskeletal: -joint pain, -muscle pain  Skin: -rash, +lesion  Neurological: -headache, +dizziness,  "-numbness, -tingling  Psychiatric: -anxiety, -depression, -sleep difficulty          Patient Active Problem List   Diagnosis    Dysmenorrhea    Primary hypertension    Migraine with aura and without status migrainosus, not intractable    Weight loss, unintentional    Prediabetes    Elevated serum protein level    At risk for medication nonadherence    Gastroesophageal reflux disease without esophagitis    Anemia         Current Outpatient Medications:     albuterol (PROVENTIL HFA) 90 mcg/actuation inhaler, Inhale 2 puffs into the lungs every 6 (six) hours as needed for Wheezing. Rescue, Disp: 18 g, Rfl: 6    ALBUTEROL INHL, Inhale into the lungs., Disp: , Rfl:     atogepant (QULIPTA) 60 mg Tab, Take 1 tablet by mouth once daily., Disp: , Rfl:     chlorthalidone (HYGROTEN) 25 MG Tab, Take 1 tablet (25 mg total) by mouth once daily., Disp: 90 tablet, Rfl: 3    EScitalopram oxalate (LEXAPRO) 10 MG tablet, Take 1 tablet (10 mg total) by mouth once daily., Disp: 90 tablet, Rfl: 3    multivitamin-Ca-iron-minerals 18-0.4 mg Tab, Take 1 tablet by mouth once daily., Disp: , Rfl:     olmesartan (BENICAR) 40 MG tablet, Take 1 tablet (40 mg total) by mouth once daily., Disp: 90 tablet, Rfl: 3    ondansetron (ZOFRAN) 4 MG tablet, , Disp: , Rfl:     pantoprazole (PROTONIX) 40 MG tablet, TAKE 1 TABLET(40 MG) BY MOUTH EVERY MORNING, Disp: 90 tablet, Rfl: 3    galcanezumab-gnlm (EMGALITY PEN) 120 mg/mL PnIj, 1 mL. (Patient not taking: Reported on 8/15/2024), Disp: , Rfl:     montelukast (SINGULAIR) 10 mg tablet, Take 1 tablet (10 mg total) by mouth every evening., Disp: 90 tablet, Rfl: 2    triamcinolone acetonide 0.1% (KENALOG) 0.1 % cream, Apply topically 2 (two) times daily. for 7 days, Disp: 28.4 g, Rfl: 0      Objective:   /84 (BP Location: Right arm, Patient Position: Sitting, BP Method: Medium (Automatic))   Pulse 88   Temp 97.6 °F (36.4 °C) (Tympanic)   Ht 5' 5" (1.651 m)   Wt 89.6 kg (197 lb 8.5 oz)   SpO2 98%   " BMI 32.87 kg/m²     Physical Exam    General: In no acute distress.  Head: Normocephalic. Non traumatic.  Eyes: PERRLA. EOMs full. Conjunctivae clear. Fundi grossly normal.  Ears: EACs clear. TMs normal.  Nose: Mucosa pink. Mucosa moist. No obstruction. LEFT NOSTRIL CONGESTED.  Throat: Clear. No exudates. No lesions.  Neck: Supple. No masses. No thyromegaly. No bruits.  Chest: Lungs clear. No rales. No rhonchi. No wheezes.  Heart: RRR. No murmurs. No rubs. No gallops.  Abdomen: Soft. No tenderness. No masses. BS normal.  : Normal external genitalia. No lesions. No discharge. No hernias  noted.  Back: Normal curvature. No scoliosis. PAIN ON PALPATION IN RIGHT LOWER QUADRANT.  Extremities: Warm. Well perfused. No upper extremity edema. No lower extremity edema. FROM. No deformities. No joint erythema.  Neuro: No focal deficits appreciated. Good muscle tone. Normal response to visual stimuli. Normal response to auditory stimuli.  Skin: Normal. No rashes. No lesions noted.          Assessment:     1. Abdominal pain, unspecified abdominal location    2. Mild intermittent asthma with acute exacerbation    3. Prediabetes    4. Primary hypertension    5. Anemia, unspecified type    6. Gastroesophageal reflux disease without esophagitis    7. Epistaxis      Plan:   Assessment & Plan    Assessed right upper quadrant pain, considering recent gallbladder removal  Evaluated asthma management and medication regimen  Considered nosebleeds potentially related to heat or dryness  Reviewed anemia status and current multivitamin intake  HYPERTENSION:  - Continued olmesartan and chlorthalidone for blood pressure management.  - Explained importance of avoiding canned goods due to elevated sodium content.  - Discussed benefits of fresh or frozen vegetables over canned for heart health.  - Recommend avoiding canned goods, opting for fresh or frozen vegetables instead.  NOSEBLEEDS:  - Instructed on proper technique for stopping  nosebleeds by pinching the nose.  - Taynisha to use nasal saline in both nasal passages for 1 week to address nosebleeds.  - Taynisha to hold nose for 30 minutes continuously without picking if nosebleed occurs.  - Taynisha to use Afrin for no more than 3 days if bleeding persists after 30 minutes of pressure.  - Message the office if nosebleeds persist after using nasal saline for a week.  ANEMIA:  - Taynisha to continue eating iron-rich foods to maintain healthy iron levels.  DEPRESSION:  - Continued Lexapro.  MIGRAINES:  - Continued Qlipta for migraines (pending fill).  - Continued Zofran as needed for nausea associated with migraines.  ASTHMA:  - Refilled Singulair 90-day supply with 2 refills for asthma, to be taken at night.  - Continued albuterol inhaler.  ECZEMA:  - Continued tramadol cream for eczema and incision care as needed.  GERD:  - Continued pantoprazole (Protonix) for GERD with existing refills.  LABS:  - Abdominal flattening erect x-ray of abdomen ordered.  - Urine test ordered.  - Blood work ordered: CBC, H&H, iron level.  FOLLOW UP:  - Follow up with Dr. Cox in 4 months.          Follow up if symptoms worsen or fail to improve.

## 2024-08-20 ENCOUNTER — PATIENT MESSAGE (OUTPATIENT)
Dept: INTERNAL MEDICINE | Facility: CLINIC | Age: 34
End: 2024-08-20
Payer: COMMERCIAL

## 2024-08-20 DIAGNOSIS — R73.03 PREDIABETES: ICD-10-CM

## 2024-08-20 DIAGNOSIS — K52.9 GASTROENTERITIS: Primary | ICD-10-CM

## 2024-08-20 RX ORDER — METFORMIN HYDROCHLORIDE 500 MG/1
500 TABLET ORAL 2 TIMES DAILY WITH MEALS
Qty: 180 TABLET | Refills: 3 | Status: SHIPPED | OUTPATIENT
Start: 2024-08-20 | End: 2025-08-20

## 2024-08-20 RX ORDER — CHOLESTYRAMINE 4 G/4.8G
4 POWDER, FOR SUSPENSION ORAL
Qty: 180 PACKET | Refills: 3 | Status: SHIPPED | OUTPATIENT
Start: 2024-08-20 | End: 2025-08-20

## 2024-09-30 ENCOUNTER — TELEPHONE (OUTPATIENT)
Dept: INTERNAL MEDICINE | Facility: CLINIC | Age: 34
End: 2024-09-30
Payer: COMMERCIAL

## 2024-09-30 NOTE — TELEPHONE ENCOUNTER
----- Message from Marisol sent at 9/30/2024  1:59 PM CDT -----  Contact: [phone]  Would like to receive medical advice.    Pt called checking the status of paper work that was fax.    Would they like a call back or a response via [facility]:  call    Additional information:  Please call to advise

## 2024-09-30 NOTE — TELEPHONE ENCOUNTER
Pt call was returned to [facility], he stated that he would like pt appointment for pre op clearance rescheduled to a earlier time.

## 2024-09-30 NOTE — TELEPHONE ENCOUNTER
----- Message from Chad sent at 9/30/2024 11:47 AM CDT -----  Contact: kirit[facility]  Kirit is requesting a call back  in regards to marleennisha is scheduled for surgery on wednesday and she needs clearance before then. Please give him a call back at [phone]

## 2024-10-01 ENCOUNTER — OFFICE VISIT (OUTPATIENT)
Dept: INTERNAL MEDICINE | Facility: CLINIC | Age: 34
End: 2024-10-01
Payer: COMMERCIAL

## 2024-10-01 ENCOUNTER — HOSPITAL ENCOUNTER (OUTPATIENT)
Dept: CARDIOLOGY | Facility: HOSPITAL | Age: 34
Discharge: HOME | End: 2024-10-01
Payer: COMMERCIAL

## 2024-10-01 VITALS
TEMPERATURE: 98 F | HEART RATE: 96 BPM | WEIGHT: 202.63 LBS | BODY MASS INDEX: 33.76 KG/M2 | HEIGHT: 65 IN | SYSTOLIC BLOOD PRESSURE: 138 MMHG | DIASTOLIC BLOOD PRESSURE: 80 MMHG

## 2024-10-01 DIAGNOSIS — Z01.818 PREOPERATIVE CLEARANCE: ICD-10-CM

## 2024-10-01 DIAGNOSIS — Z01.818 PREOPERATIVE CLEARANCE: Primary | ICD-10-CM

## 2024-10-01 DIAGNOSIS — F32.A DEPRESSION, UNSPECIFIED DEPRESSION TYPE: ICD-10-CM

## 2024-10-01 DIAGNOSIS — F41.9 ANXIETY: ICD-10-CM

## 2024-10-01 PROCEDURE — 4010F ACE/ARB THERAPY RXD/TAKEN: CPT | Mod: CPTII,S$GLB,, | Performed by: NURSE PRACTITIONER

## 2024-10-01 PROCEDURE — 3008F BODY MASS INDEX DOCD: CPT | Mod: CPTII,S$GLB,, | Performed by: NURSE PRACTITIONER

## 2024-10-01 PROCEDURE — 1160F RVW MEDS BY RX/DR IN RCRD: CPT | Mod: CPTII,S$GLB,, | Performed by: NURSE PRACTITIONER

## 2024-10-01 PROCEDURE — 3044F HG A1C LEVEL LT 7.0%: CPT | Mod: CPTII,S$GLB,, | Performed by: NURSE PRACTITIONER

## 2024-10-01 PROCEDURE — 99999 PR PBB SHADOW E&M-EST. PATIENT-LVL IV: CPT | Mod: PBBFAC,,, | Performed by: NURSE PRACTITIONER

## 2024-10-01 PROCEDURE — 93005 ELECTROCARDIOGRAM TRACING: CPT

## 2024-10-01 PROCEDURE — 3079F DIAST BP 80-89 MM HG: CPT | Mod: CPTII,S$GLB,, | Performed by: NURSE PRACTITIONER

## 2024-10-01 PROCEDURE — 93010 ELECTROCARDIOGRAM REPORT: CPT | Mod: ,,, | Performed by: INTERNAL MEDICINE

## 2024-10-01 PROCEDURE — 3075F SYST BP GE 130 - 139MM HG: CPT | Mod: CPTII,S$GLB,, | Performed by: NURSE PRACTITIONER

## 2024-10-01 PROCEDURE — 99213 OFFICE O/P EST LOW 20 MIN: CPT | Mod: S$GLB,,, | Performed by: NURSE PRACTITIONER

## 2024-10-01 PROCEDURE — 1159F MED LIST DOCD IN RCRD: CPT | Mod: CPTII,S$GLB,, | Performed by: NURSE PRACTITIONER

## 2024-10-01 PROCEDURE — G2211 COMPLEX E/M VISIT ADD ON: HCPCS | Mod: S$GLB,,, | Performed by: NURSE PRACTITIONER

## 2024-10-01 NOTE — PROGRESS NOTES
Subjective:      Patient ID: Emeka Miramontes is a 34 y.o. female.    Chief Complaint: Pre-op Exam    History of Present Illness    CHIEF COMPLAINT:  Emeka presents today for pre-operative evaluation.    UPCOMING SURGERY:  She is scheduled for right elbow ulnar nerve transposition surgery tomorrow. She reports no previous problems with anesthesia.    MEDICAL HISTORY:  She has a history of anemia, hypertension, and diabetes. She denies any history of cancer, heart disease, or sickle cell disease.    SURGICAL HISTORY:  Her surgical history includes a right knee arthroscopy in October 2023 for a previous knee fracture, as well as a cholecystectomy and esophagogastroduodenoscopy (EGD) at unspecified dates in the past.    ALLERGIES:  She reports an allergy to aspirin.    FAMILY HISTORY:  Her mother had stage four breast cancer. She confirms a family history of hypertension. She denies family history of heart disease, sickle cell, or hepatitis. She is unsure about family history of diabetes.    SOCIAL HISTORY:  She reports occasional alcohol use and denies smoking.    MEDICATIONS:  She is currently taking Lexapro and Metformin. She has a year's supply of Lexapro.    CURRENT SYMPTOMS:  She reports experiencing sinus problems that began this morning. She notes these symptoms typically occur during seasonal transitions, particularly between summer and fall, as well as between winter and spring.    LABS AND IMAGING:  She reports having recent lab work done in August, mentioning that her sodium was low in previous results. She denies recent episodes of diarrhea when questioned about potential causes for low sodium. She had an EKG performed in October of last year associated with her knee surgery.      ROS:  General: -fever, -chills, -fatigue, -weight gain, -weight loss  Eyes: -vision changes, -redness, -discharge  ENT: -ear pain, +nasal congestion, -sore throat  Cardiovascular: -chest pain, -palpitations, -lower  extremity edema  Respiratory: -cough, -shortness of breath  Gastrointestinal: -abdominal pain, -nausea, -vomiting, -diarrhea, -constipation, -blood in stool  Genitourinary: -dysuria, -hematuria, -frequency  Musculoskeletal: -joint pain, -muscle pain  Skin: -rash, -lesion  Neurological: -headache, -dizziness, -numbness, -tingling  Psychiatric: -anxiety, -depression, -sleep difficulty  Allergic: +allergic reactions          Patient Active Problem List   Diagnosis    Dysmenorrhea    Primary hypertension    Migraine with aura and without status migrainosus, not intractable    Weight loss, unintentional    Prediabetes    Elevated serum protein level    At risk for medication nonadherence    Gastroesophageal reflux disease without esophagitis    Anemia         Current Outpatient Medications:     albuterol (PROVENTIL HFA) 90 mcg/actuation inhaler, Inhale 2 puffs into the lungs every 6 (six) hours as needed for Wheezing. Rescue, Disp: 18 g, Rfl: 6    atogepant (QULIPTA) 60 mg Tab, Take 1 tablet by mouth once daily., Disp: , Rfl:     chlorthalidone (HYGROTEN) 25 MG Tab, Take 1 tablet (25 mg total) by mouth once daily., Disp: 90 tablet, Rfl: 3    cholestyramine-aspartame (CHOLESTYRAMINE LIGHT) 4 grams of anhydrous PwPk, Take 1 packet (4 grams of anhydrous cholestyramine total) by mouth 2 (two) times daily before meals., Disp: 180 packet, Rfl: 3    EScitalopram oxalate (LEXAPRO) 10 MG tablet, Take 1 tablet (10 mg total) by mouth once daily., Disp: 90 tablet, Rfl: 3    metFORMIN (GLUCOPHAGE) 500 MG tablet, Take 1 tablet (500 mg total) by mouth 2 (two) times daily with meals. Take 1 tablet with food for 1 week then may increase to 1 tablet BID with meals, Disp: 180 tablet, Rfl: 3    montelukast (SINGULAIR) 10 mg tablet, Take 1 tablet (10 mg total) by mouth every evening., Disp: 90 tablet, Rfl: 2    multivitamin-Ca-iron-minerals 18-0.4 mg Tab, Take 1 tablet by mouth once daily., Disp: , Rfl:     olmesartan (BENICAR) 40 MG tablet,  "Take 1 tablet (40 mg total) by mouth once daily., Disp: 90 tablet, Rfl: 3    ondansetron (ZOFRAN) 4 MG tablet, , Disp: , Rfl:     pantoprazole (PROTONIX) 40 MG tablet, TAKE 1 TABLET(40 MG) BY MOUTH EVERY MORNING, Disp: 90 tablet, Rfl: 3    ALBUTEROL INHL, Inhale into the lungs., Disp: , Rfl:     galcanezumab-gnlm (EMGALITY PEN) 120 mg/mL PnIj, 1 mL. (Patient not taking: Reported on 10/1/2024), Disp: , Rfl:     triamcinolone acetonide 0.1% (KENALOG) 0.1 % cream, Apply topically 2 (two) times daily. for 7 days (Patient not taking: Reported on 10/1/2024), Disp: 28.4 g, Rfl: 0      Objective:   /80 (BP Location: Right arm, Patient Position: Sitting)   Pulse 96   Temp 98.4 °F (36.9 °C) (Tympanic)   Ht 5' 5" (1.651 m)   Wt 91.9 kg (202 lb 9.6 oz)   LMP 09/18/2024 (Exact Date)   BMI 33.71 kg/m²     Physical Exam             Physical Exam  Vitals and nursing note reviewed.   Constitutional:       General: She is awake. She is not in acute distress.     Appearance: Normal appearance. She is well-developed and well-groomed. She is not ill-appearing, toxic-appearing or diaphoretic.   HENT:      Head: Normocephalic.      Right Ear: Tympanic membrane, ear canal and external ear normal.      Left Ear: Tympanic membrane, ear canal and external ear normal.      Mouth/Throat:      Pharynx: No pharyngeal swelling, oropharyngeal exudate, posterior oropharyngeal erythema, uvula swelling or postnasal drip.      Tonsils: No tonsillar exudate or tonsillar abscesses. 0 on the right. 0 on the left.   Cardiovascular:      Rate and Rhythm: Normal rate and regular rhythm.      Pulses: Normal pulses.      Heart sounds: Normal heart sounds. No murmur heard.  Pulmonary:      Effort: Pulmonary effort is normal. No tachypnea, bradypnea, accessory muscle usage, prolonged expiration, respiratory distress or retractions.      Breath sounds: Normal breath sounds. No stridor, decreased air movement or transmitted upper airway sounds. No " decreased breath sounds, wheezing, rhonchi or rales.   Abdominal:      General: Abdomen is flat. There is no distension.      Palpations: Abdomen is soft. There is no mass.      Tenderness: There is no abdominal tenderness. There is no guarding or rebound.      Hernia: No hernia is present.   Musculoskeletal:         General: Normal range of motion.      Cervical back: Normal range of motion.   Skin:     Findings: No rash.   Neurological:      General: No focal deficit present.      Mental Status: She is alert and oriented to person, place, and time.      Gait: Gait normal.   Psychiatric:         Attention and Perception: Attention and perception normal.         Mood and Affect: Mood and affect normal.         Speech: Speech normal.         Behavior: Behavior normal. Behavior is cooperative.         Thought Content: Thought content normal.         Cognition and Memory: Cognition normal.         Judgment: Judgment normal.          Assessment:     1. Preoperative clearance    2. Depression, unspecified depression type    3. Anxiety      Plan:   Assessment & Plan    Reviewed patient's medical history, including hypertension, anemia, and recent knee surgery  Assessed patient's readiness for upcoming right elbow ulnar nerve transposition surgery  Evaluated current medications and allergies  Considered recent labs, noting low sodium levels  Discussed need for updated pre-operative tests due to missed morning appointment    DIABETES:  - Continued metformin 180 pills (refill provided).    DEPRESSION:  - Continued Lexapro (year's supply previously provided).    CARDIAC EVALUATION:  - EKG stat ordered.    LABS:  - BMP (Basic Metabolic Panel) ordered.  - CBC (Complete Blood Count) ordered.    SPECIALIST CONSULTATION:  - Referred for video visit with Dr. Cox.  - Complete at least 1 video visit with Dr. Cox annually     FOLLOW UP:  - Follow up tomorrow at 9:00 AM for surgery check-in.   - Patient is noted low risk  for any cardiovascular events; cleared pending EKG results         Follow up if symptoms worsen or fail to improve.

## 2024-10-02 ENCOUNTER — TELEPHONE (OUTPATIENT)
Dept: INTERNAL MEDICINE | Facility: CLINIC | Age: 34
End: 2024-10-02
Payer: COMMERCIAL

## 2024-10-02 LAB
OHS QRS DURATION: 70 MS
OHS QTC CALCULATION: 455 MS

## 2024-10-02 NOTE — TELEPHONE ENCOUNTER
----- Message from Summer sent at 10/2/2024  9:01 AM CDT -----  Contact: [facility]  Kirit is needing a call back regarding a surgery clearance for the pt. She has an appointment today and he needs the MRI results as well. Please call back at [phone] fax number is [phone]. Thank you

## 2024-12-02 ENCOUNTER — TELEPHONE (OUTPATIENT)
Dept: INTERNAL MEDICINE | Facility: CLINIC | Age: 34
End: 2024-12-02
Payer: COMMERCIAL

## 2024-12-02 NOTE — TELEPHONE ENCOUNTER
Attempted to reach pt to reschedule appointment for today at 4:20 for an earlier appt or go to the ER if she is having active chest pains.the appt would need to be rescheduled due to that slot being only for follow ups. LMOM for pt to call back at the clinic.

## 2024-12-03 ENCOUNTER — HOSPITAL ENCOUNTER (OUTPATIENT)
Dept: RADIOLOGY | Facility: HOSPITAL | Age: 34
Discharge: HOME | End: 2024-12-03
Attending: NURSE PRACTITIONER
Payer: COMMERCIAL

## 2024-12-03 ENCOUNTER — HOSPITAL ENCOUNTER (OUTPATIENT)
Dept: CARDIOLOGY | Facility: HOSPITAL | Age: 34
Discharge: HOME | End: 2024-12-03
Attending: NURSE PRACTITIONER
Payer: COMMERCIAL

## 2024-12-03 ENCOUNTER — OFFICE VISIT (OUTPATIENT)
Dept: INTERNAL MEDICINE | Facility: CLINIC | Age: 34
End: 2024-12-03
Payer: COMMERCIAL

## 2024-12-03 VITALS
SYSTOLIC BLOOD PRESSURE: 152 MMHG | WEIGHT: 212.94 LBS | HEIGHT: 65 IN | TEMPERATURE: 97 F | BODY MASS INDEX: 35.48 KG/M2 | DIASTOLIC BLOOD PRESSURE: 104 MMHG

## 2024-12-03 DIAGNOSIS — R06.02 SHORTNESS OF BREATH: ICD-10-CM

## 2024-12-03 DIAGNOSIS — R07.1 CHEST PAIN ON BREATHING: ICD-10-CM

## 2024-12-03 DIAGNOSIS — R06.2 WHEEZING: ICD-10-CM

## 2024-12-03 DIAGNOSIS — E66.01 SEVERE OBESITY (BMI 35.0-39.9) WITH COMORBIDITY: ICD-10-CM

## 2024-12-03 DIAGNOSIS — I10 PRIMARY HYPERTENSION: Chronic | ICD-10-CM

## 2024-12-03 DIAGNOSIS — J45.21 MILD INTERMITTENT ASTHMA WITH ACUTE EXACERBATION: Primary | ICD-10-CM

## 2024-12-03 LAB
OHS QRS DURATION: 70 MS
OHS QTC CALCULATION: 446 MS

## 2024-12-03 PROCEDURE — 93005 ELECTROCARDIOGRAM TRACING: CPT

## 2024-12-03 PROCEDURE — 93010 ELECTROCARDIOGRAM REPORT: CPT | Mod: ,,, | Performed by: INTERNAL MEDICINE

## 2024-12-03 PROCEDURE — 71046 X-RAY EXAM CHEST 2 VIEWS: CPT | Mod: 26,,, | Performed by: RADIOLOGY

## 2024-12-03 PROCEDURE — 71046 X-RAY EXAM CHEST 2 VIEWS: CPT | Mod: TC

## 2024-12-03 PROCEDURE — 99999 PR PBB SHADOW E&M-EST. PATIENT-LVL IV: CPT | Mod: PBBFAC,,, | Performed by: NURSE PRACTITIONER

## 2024-12-03 RX ORDER — PREDNISONE 20 MG/1
20 TABLET ORAL 2 TIMES DAILY
Qty: 10 TABLET | Refills: 0 | Status: SHIPPED | OUTPATIENT
Start: 2024-12-03 | End: 2024-12-08

## 2024-12-03 RX ORDER — AZITHROMYCIN 250 MG/1
TABLET, FILM COATED ORAL
Qty: 6 TABLET | Refills: 0 | Status: SHIPPED | OUTPATIENT
Start: 2024-12-03 | End: 2024-12-08

## 2024-12-03 RX ORDER — ALBUTEROL SULFATE 90 UG/1
2 INHALANT RESPIRATORY (INHALATION) EVERY 6 HOURS PRN
Qty: 18 G | Refills: 1 | Status: SHIPPED | OUTPATIENT
Start: 2024-12-03
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
## 2024-12-04 ENCOUNTER — TELEPHONE (OUTPATIENT)
Dept: INTERNAL MEDICINE | Facility: CLINIC | Age: 34
End: 2024-12-04
Payer: COMMERCIAL

## 2024-12-04 PROBLEM — E66.01 SEVERE OBESITY (BMI 35.0-39.9) WITH COMORBIDITY: Status: ACTIVE | Noted: 2024-12-04

## 2024-12-04 NOTE — TELEPHONE ENCOUNTER
LMOM to return call to clinic to schedule a 2 week nurse visit follow up to recheck blood pressure, return call at [phone] or schedule through [facility].

## 2024-12-18 ENCOUNTER — OFFICE VISIT (OUTPATIENT)
Dept: INTERNAL MEDICINE | Facility: CLINIC | Age: 34
End: 2024-12-18
Payer: COMMERCIAL

## 2024-12-18 ENCOUNTER — LAB VISIT (OUTPATIENT)
Dept: LAB | Facility: HOSPITAL | Age: 34
End: 2024-12-18
Attending: NURSE PRACTITIONER
Payer: COMMERCIAL

## 2024-12-18 ENCOUNTER — CLINICAL SUPPORT (OUTPATIENT)
Dept: PULMONOLOGY | Facility: CLINIC | Age: 34
End: 2024-12-18
Payer: COMMERCIAL

## 2024-12-18 VITALS
SYSTOLIC BLOOD PRESSURE: 160 MMHG | BODY MASS INDEX: 35.85 KG/M2 | WEIGHT: 215.19 LBS | HEART RATE: 110 BPM | HEIGHT: 65 IN | TEMPERATURE: 97 F | DIASTOLIC BLOOD PRESSURE: 100 MMHG

## 2024-12-18 DIAGNOSIS — I10 PRIMARY HYPERTENSION: Chronic | ICD-10-CM

## 2024-12-18 DIAGNOSIS — R07.1 CHEST PAIN ON BREATHING: ICD-10-CM

## 2024-12-18 DIAGNOSIS — J45.21 MILD INTERMITTENT ASTHMA WITH ACUTE EXACERBATION: Primary | ICD-10-CM

## 2024-12-18 DIAGNOSIS — Z91.89 AT RISK FOR MEDICATION NONADHERENCE: Chronic | ICD-10-CM

## 2024-12-18 DIAGNOSIS — J45.21 MILD INTERMITTENT ASTHMA WITH ACUTE EXACERBATION: ICD-10-CM

## 2024-12-18 LAB
BASOPHILS # BLD AUTO: 0.03 K/UL (ref 0–0.2)
BASOPHILS NFR BLD: 0.4 % (ref 0–1.9)
DIFFERENTIAL METHOD BLD: ABNORMAL
EOSINOPHIL # BLD AUTO: 0.1 K/UL (ref 0–0.5)
EOSINOPHIL NFR BLD: 1.3 % (ref 0–8)
ERYTHROCYTE [DISTWIDTH] IN BLOOD BY AUTOMATED COUNT: 16 % (ref 11.5–14.5)
HCT VFR BLD AUTO: 37.4 % (ref 37–48.5)
HGB BLD-MCNC: 11.7 G/DL (ref 12–16)
IMM GRANULOCYTES # BLD AUTO: 0.03 K/UL (ref 0–0.04)
IMM GRANULOCYTES NFR BLD AUTO: 0.4 % (ref 0–0.5)
LYMPHOCYTES # BLD AUTO: 1.8 K/UL (ref 1–4.8)
LYMPHOCYTES NFR BLD: 23.6 % (ref 18–48)
MCH RBC QN AUTO: 31.2 PG (ref 27–31)
MCHC RBC AUTO-ENTMCNC: 31.3 G/DL (ref 32–36)
MCV RBC AUTO: 100 FL (ref 82–98)
MONOCYTES # BLD AUTO: 0.8 K/UL (ref 0.3–1)
MONOCYTES NFR BLD: 10.9 % (ref 4–15)
NEUTROPHILS # BLD AUTO: 4.8 K/UL (ref 1.8–7.7)
NEUTROPHILS NFR BLD: 63.4 % (ref 38–73)
NRBC BLD-RTO: 0 /100 WBC
PLATELET # BLD AUTO: 265 K/UL (ref 150–450)
PMV BLD AUTO: 12.1 FL (ref 9.2–12.9)
RBC # BLD AUTO: 3.75 M/UL (ref 4–5.4)
WBC # BLD AUTO: 7.55 K/UL (ref 3.9–12.7)

## 2024-12-18 PROCEDURE — 36415 COLL VENOUS BLD VENIPUNCTURE: CPT | Performed by: NURSE PRACTITIONER

## 2024-12-18 PROCEDURE — 3077F SYST BP >= 140 MM HG: CPT | Mod: CPTII,S$GLB,, | Performed by: NURSE PRACTITIONER

## 2024-12-18 PROCEDURE — 3080F DIAST BP >= 90 MM HG: CPT | Mod: CPTII,S$GLB,, | Performed by: NURSE PRACTITIONER

## 2024-12-18 PROCEDURE — 85025 COMPLETE CBC W/AUTO DIFF WBC: CPT | Performed by: NURSE PRACTITIONER

## 2024-12-18 PROCEDURE — 1159F MED LIST DOCD IN RCRD: CPT | Mod: CPTII,S$GLB,, | Performed by: NURSE PRACTITIONER

## 2024-12-18 PROCEDURE — 99214 OFFICE O/P EST MOD 30 MIN: CPT | Mod: S$GLB,,, | Performed by: NURSE PRACTITIONER

## 2024-12-18 PROCEDURE — 4010F ACE/ARB THERAPY RXD/TAKEN: CPT | Mod: CPTII,S$GLB,, | Performed by: NURSE PRACTITIONER

## 2024-12-18 PROCEDURE — 99999 PR PBB SHADOW E&M-EST. PATIENT-LVL IV: CPT | Mod: PBBFAC,,, | Performed by: NURSE PRACTITIONER

## 2024-12-18 PROCEDURE — G2211 COMPLEX E/M VISIT ADD ON: HCPCS | Mod: S$GLB,,, | Performed by: NURSE PRACTITIONER

## 2024-12-18 PROCEDURE — 1160F RVW MEDS BY RX/DR IN RCRD: CPT | Mod: CPTII,S$GLB,, | Performed by: NURSE PRACTITIONER

## 2024-12-18 PROCEDURE — 3008F BODY MASS INDEX DOCD: CPT | Mod: CPTII,S$GLB,, | Performed by: NURSE PRACTITIONER

## 2024-12-18 PROCEDURE — 99999 PR PBB SHADOW E&M-EST. PATIENT-LVL I: CPT | Mod: PBBFAC,,,

## 2024-12-18 PROCEDURE — 3044F HG A1C LEVEL LT 7.0%: CPT | Mod: CPTII,S$GLB,, | Performed by: NURSE PRACTITIONER

## 2024-12-18 PROCEDURE — 82785 ASSAY OF IGE: CPT | Performed by: NURSE PRACTITIONER

## 2024-12-18 RX ORDER — RIMEGEPANT SULFATE 75 MG/75MG
TABLET, ORALLY DISINTEGRATING ORAL
COMMUNITY
Start: 2024-03-04

## 2024-12-18 RX ORDER — GABAPENTIN 300 MG/1
300 CAPSULE ORAL 2 TIMES DAILY PRN
COMMUNITY
Start: 2024-12-02

## 2024-12-18 RX ORDER — PROMETHAZINE HYDROCHLORIDE AND DEXTROMETHORPHAN HYDROBROMIDE 6.25; 15 MG/5ML; MG/5ML
5 SYRUP ORAL EVERY 6 HOURS PRN
COMMUNITY
Start: 2024-05-08 | End: 2024-12-18 | Stop reason: ALTCHOICE

## 2024-12-18 RX ORDER — LORAZEPAM 0.5 MG/1
0.5 TABLET ORAL DAILY PRN
COMMUNITY
Start: 2024-12-13

## 2024-12-18 NOTE — PROGRESS NOTES
Subjective:      Patient ID: Emeak Miramontes is a 34 y.o. female.    Chief Complaint: Chest Pain    History of Present Illness    CHIEF COMPLAINT:  Emeka presents today with mid-sternal chest pain.    CHEST PAIN:  She experiences mid-sternal chest pain rated 6/10 that worsens with sneezing, coughing, and deep breaths. The pain interferes with sleep. She reports an episode of chest tightness with profuse sweating on Monday night.    RESPIRATORY:  She reports persistent wheezing and has not been evaluated by a pulmonologist.    MEDICATIONS:  She currently takes pantoprazole and blood pressure medication, which she took immediately prior to appointment. She reports difficulty obtaining her blood pressure medication refills. She has not taken Tylenol despite recommendation.    ALLERGIES:  She has an allergy to aspirin.      ROS:  General: -fever, -chills, -fatigue, -weight gain, -weight loss, +night sweats  Eyes: -vision changes, -redness, -discharge  ENT: -ear pain, -nasal congestion, -sore throat  Cardiovascular: +chest pain, -palpitations, -lower extremity edema  Respiratory: +cough, -shortness of breath, +wheezing  Gastrointestinal: -abdominal pain, -nausea, -vomiting, -diarrhea, -constipation, -blood in stool  Genitourinary: -dysuria, -hematuria, -frequency  Musculoskeletal: -joint pain, -muscle pain  Skin: -rash, -lesion  Neurological: -headache, -dizziness, -numbness, -tingling  Psychiatric: -anxiety, -depression, -sleep difficulty          Patient Active Problem List   Diagnosis    Dysmenorrhea    Primary hypertension    Migraine with aura and without status migrainosus, not intractable    Weight loss, unintentional    Prediabetes    Elevated serum protein level    At risk for medication nonadherence    Gastroesophageal reflux disease without esophagitis    Anemia    Severe obesity (BMI 35.0-39.9) with comorbidity         Current Outpatient Medications:     albuterol (PROVENTIL HFA) 90 mcg/actuation  inhaler, Inhale 2 puffs into the lungs every 6 (six) hours as needed for Wheezing. Rescue, Disp: 18 g, Rfl: 6    albuterol (PROVENTIL/VENTOLIN HFA) 90 mcg/actuation inhaler, Inhale 2 puffs into the lungs every 6 (six) hours as needed for Wheezing., Disp: 18 g, Rfl: 1    atogepant (QULIPTA) 60 mg Tab, Take 1 tablet by mouth once daily., Disp: , Rfl:     chlorthalidone (HYGROTEN) 25 MG Tab, Take 1 tablet (25 mg total) by mouth once daily., Disp: 90 tablet, Rfl: 3    EScitalopram oxalate (LEXAPRO) 10 MG tablet, Take 1 tablet (10 mg total) by mouth once daily., Disp: 90 tablet, Rfl: 3    gabapentin (NEURONTIN) 300 MG capsule, Take 300 mg by mouth 2 (two) times daily as needed., Disp: , Rfl:     LORazepam (ATIVAN) 0.5 MG tablet, Take 0.5 mg by mouth daily as needed., Disp: , Rfl:     montelukast (SINGULAIR) 10 mg tablet, Take 1 tablet (10 mg total) by mouth every evening., Disp: 90 tablet, Rfl: 2    multivitamin-Ca-iron-minerals 18-0.4 mg Tab, Take 1 tablet by mouth once daily., Disp: , Rfl:     olmesartan (BENICAR) 40 MG tablet, Take 1 tablet (40 mg total) by mouth once daily., Disp: 90 tablet, Rfl: 3    ondansetron (ZOFRAN) 4 MG tablet, , Disp: , Rfl:     pantoprazole (PROTONIX) 40 MG tablet, TAKE 1 TABLET(40 MG) BY MOUTH EVERY MORNING, Disp: 90 tablet, Rfl: 3    rimegepant (NURTEC) 75 mg odt, DISSOLVE ONE TABLET BY MOUTH EVERY DAY AT ONSET OF MIGRAINE. NO MORE THAT 3 DAYS/WEEK, Disp: , Rfl:     ALBUTEROL INHL, Inhale into the lungs., Disp: , Rfl:     cholestyramine-aspartame (CHOLESTYRAMINE LIGHT) 4 grams of anhydrous PwPk, Take 1 packet (4 grams of anhydrous cholestyramine total) by mouth 2 (two) times daily before meals. (Patient not taking: Reported on 12/18/2024), Disp: 180 packet, Rfl: 3    metFORMIN (GLUCOPHAGE) 500 MG tablet, Take 1 tablet (500 mg total) by mouth 2 (two) times daily with meals. Take 1 tablet with food for 1 week then may increase to 1 tablet BID with meals (Patient not taking: Reported on  "12/18/2024), Disp: 180 tablet, Rfl: 3      Objective:   BP (!) 160/100 (BP Location: Right arm, Patient Position: Sitting)   Pulse 110   Temp 97.1 °F (36.2 °C) (Tympanic)   Ht 5' 5" (1.651 m)   Wt 97.6 kg (215 lb 2.7 oz)   LMP 12/04/2024 (Exact Date)   BMI 35.81 kg/m²     Physical Exam             Physical Exam  Vitals and nursing note reviewed.   Constitutional:       General: She is awake. She is not in acute distress.     Appearance: Normal appearance. She is well-developed and well-groomed. She is not ill-appearing, toxic-appearing or diaphoretic.   HENT:      Head: Normocephalic and atraumatic.      Right Ear: Tympanic membrane and external ear normal.      Left Ear: External ear normal.   Eyes:      Pupils: Pupils are equal, round, and reactive to light.   Cardiovascular:      Rate and Rhythm: Normal rate and regular rhythm.      Heart sounds: Normal heart sounds. No murmur heard.  Pulmonary:      Effort: Pulmonary effort is normal. No tachypnea, bradypnea, accessory muscle usage, prolonged expiration, respiratory distress or retractions.      Breath sounds: Normal breath sounds. No stridor, decreased air movement or transmitted upper airway sounds. No decreased breath sounds, wheezing, rhonchi or rales.   Abdominal:      General: Abdomen is flat. Bowel sounds are normal.      Palpations: Abdomen is soft.   Musculoskeletal:         General: No swelling, tenderness, deformity or signs of injury.      Cervical back: Normal range of motion and neck supple.      Right lower leg: No edema.      Left lower leg: No edema.   Skin:     General: Skin is warm and dry.      Capillary Refill: Capillary refill takes less than 2 seconds.   Neurological:      General: No focal deficit present.      Mental Status: She is alert and oriented to person, place, and time.      Gait: Gait normal.   Psychiatric:         Attention and Perception: Attention and perception normal.         Mood and Affect: Mood and affect normal.   "       Speech: Speech normal.         Behavior: Behavior normal. Behavior is cooperative.         Cognition and Memory: Cognition normal.          Assessment/Plan :   1. Mild intermittent asthma with acute exacerbation  -     IGE; Future; Expected date: 12/18/2024  -     CBC W/ AUTO DIFFERENTIAL; Future; Expected date: 12/18/2024  -     Cancel: Complete PFT with bronchodilator and FeNO; Future  -     E-Consult to Pulmonary    2. Chest pain on breathing  -     IGE; Future; Expected date: 12/18/2024  -     CBC W/ AUTO DIFFERENTIAL; Future; Expected date: 12/18/2024  -     Cancel: Complete PFT with bronchodilator and FeNO; Future  -     E-Consult to Pulmonary    3. Primary hypertension    4. At risk for medication nonadherence         Assessment & Plan    Assessed chest pain, considering potential asthma exacerbation and inflammation  Evaluated effectiveness of current blood pressure medication  Considered pulmonary function testing to further investigate respiratory symptoms  Considered e-consult with pulmonology for persistent wheezing and chest tightness  Ordered total IgE and peripheral blood smear to investigate potential allergic or hematological factors    ASTHMA AND RESPIRATORY ISSUES:  - Taynisha to take deep breaths every hour.  - Continued inhaler use as needed.  - Pulmonary function test (PFT) ordered.  - Total IgE level ordered.  - Referred to pulmonology for e-consult regarding persistent wheezing and chest tightness.    PAIN MANAGEMENT:  - Started Tylenol 1,000 mg every 6 hours for pain for 1 week.    HYPERTENSION:  - Continued blood pressure medication as prescribed.    GASTROINTESTINAL:  - Continued pantoprazole as prescribed.    HEMATOLOGY:  - Peripheral blood smear ordered.    FOLLOW-UP:  - Follow up once lab results are back for review.          No follow-ups on file.    This note was generated with the assistance of ambient listening technology. Verbal consent was obtained by the patient and  accompanying visitor(s) for the recording of patient appointment to facilitate this note. I attest to having reviewed and edited the generated note for accuracy, though some syntax or spelling errors may persist. Please contact the author of this note for any clarification.

## 2024-12-19 ENCOUNTER — E-CONSULT (OUTPATIENT)
Dept: PULMONOLOGY | Facility: CLINIC | Age: 34
End: 2024-12-19
Payer: COMMERCIAL

## 2024-12-19 DIAGNOSIS — R07.9 CHEST PAIN, UNSPECIFIED TYPE: Primary | ICD-10-CM

## 2024-12-19 LAB — IGE SERPL-ACNC: 22 IU/ML (ref 0–100)

## 2024-12-19 NOTE — CONSULTS
[facility]  Response for E-Consult     Patient Name: Emeka Miramontes  MRN: [id number]  Primary Care Provider: RAJAT Cox MD   Requesting Provider: Jennifer Du NP  Consults    After evaluation of your eConsult clinical questions, I believe the patient should be scheduled for an office visit in our specialty due to inability to properly assess and manage in virtual format    Total time of Consultation: 5 minute    *This eConsult is based on the clinical data available to me and is furnished without benefit of a physician examination.  The eConsult will need to be interpreted in light of any clinical issues of changes in patient status not available to me at the time rime of filing this eConsults.  Significant changes in patient condition of level of acuity should result in a referral for in person consultation and reevaluation.  Please alert me if you have any furth questions.     Thank you for this eConsult referral.     Phil Cervantes MD  [facility]

## 2024-12-20 LAB
BRPFT: NORMAL
DLCO ADJ PRE: 19.02 ML/(MIN*MMHG)
DLCO SINGLE BREATH LLN: 21.17
DLCO SINGLE BREATH PRE REF: 70.7 %
DLCO SINGLE BREATH REF: 26.9
DLCOC SBVA LLN: 3.79
DLCOC SBVA PRE REF: 80 %
DLCOC SBVA REF: 5.34
DLCOC SINGLE BREATH LLN: 21.17
DLCOC SINGLE BREATH PRE REF: 70.7 %
DLCOC SINGLE BREATH REF: 26.9
DLCOVA LLN: 3.79
DLCOVA PRE REF: 80 %
DLCOVA PRE: 4.28 ML/(MIN*MMHG*L)
DLCOVA REF: 5.34
DLVAADJ PRE: 4.28 ML/(MIN*MMHG*L)
ERV LLN: -16448.8
ERV PRE REF: 31 %
ERV REF: 1.2
FEF 25 75 LLN: 2.42
FEF 25 75 PRE REF: 106.5 %
FEF 25 75 REF: 3.81
FEV1 FVC LLN: 72
FEV1 FVC PRE REF: 107.1 %
FEV1 FVC REF: 84
FEV1 LLN: 2.56
FEV1 PRE REF: 108 %
FEV1 REF: 3.2
FRCPLETH LLN: 1.89
FRCPLETH PREREF: 107.5 %
FRCPLETH REF: 2.71
FVC LLN: 3.07
FVC PRE REF: 100.3 %
FVC REF: 3.84
IVC PRE: 3.23 L
IVC SINGLE BREATH LLN: 3.07
IVC SINGLE BREATH PRE REF: 84.1 %
IVC SINGLE BREATH REF: 3.84
MVV LLN: 103
MVV PRE REF: 45.4 %
MVV REF: 121
PEF LLN: 5.36
PEF PRE REF: 82.9 %
PEF REF: 7.1
PRE DLCO: 19.02 ML/(MIN*MMHG)
PRE ERV: 0.37 L
PRE FEF 25 75: 4.06 L/S
PRE FET 100: 1.9 SEC
PRE FEV1 FVC: 89.69 %
PRE FEV1: 3.46 L
PRE FRC PL: 2.91 L
PRE FVC: 3.85 L
PRE MVV: 55 L/MIN
PRE PEF: 5.88 L/S
PRE RV: 2.22 L
PRE TLC: 6.07 L
RAW LLN: 3.06
RAW PRE REF: 84 %
RAW PRE: 2.57 CMH2O*S/L
RAW REF: 3.06
RV LLN: 0.94
RV PRE REF: 146.7 %
RV REF: 1.51
RVTLC LLN: 21
RVTLC PRE REF: 119.7 %
RVTLC PRE: 36.54 %
RVTLC REF: 31
TLC LLN: 4.05
TLC PRE REF: 120.6 %
TLC REF: 5.03
VA PRE: 4.45 L
VA SINGLE BREATH LLN: 4.88
VA SINGLE BREATH PRE REF: 91.1 %
VA SINGLE BREATH REF: 4.88
VC LLN: 3.07
VC PRE REF: 100.3 %
VC PRE: 3.85 L
VC REF: 3.84
VTGRAWPRE: 2.93 L

## 2024-12-26 ENCOUNTER — PATIENT MESSAGE (OUTPATIENT)
Dept: INTERNAL MEDICINE | Facility: CLINIC | Age: 34
End: 2024-12-26
Payer: COMMERCIAL

## 2024-12-26 DIAGNOSIS — R07.1 CHEST PAIN ON BREATHING: ICD-10-CM

## 2024-12-26 DIAGNOSIS — R94.2 ABNORMAL PFT: ICD-10-CM

## 2024-12-26 DIAGNOSIS — J45.21 MILD INTERMITTENT ASTHMA WITH ACUTE EXACERBATION: Primary | ICD-10-CM

## 2025-01-02 ENCOUNTER — OFFICE VISIT (OUTPATIENT)
Dept: PULMONOLOGY | Facility: CLINIC | Age: 35
End: 2025-01-02
Payer: COMMERCIAL

## 2025-01-02 VITALS
DIASTOLIC BLOOD PRESSURE: 84 MMHG | WEIGHT: 209.44 LBS | SYSTOLIC BLOOD PRESSURE: 122 MMHG | HEART RATE: 108 BPM | HEIGHT: 65 IN | BODY MASS INDEX: 34.89 KG/M2 | OXYGEN SATURATION: 99 % | RESPIRATION RATE: 20 BRPM

## 2025-01-02 DIAGNOSIS — R07.1 CHEST PAIN ON BREATHING: ICD-10-CM

## 2025-01-02 DIAGNOSIS — R94.2 ABNORMAL PFT: ICD-10-CM

## 2025-01-02 DIAGNOSIS — J45.21 MILD INTERMITTENT ASTHMA WITH ACUTE EXACERBATION: ICD-10-CM

## 2025-01-02 DIAGNOSIS — R05.8 UPPER AIRWAY COUGH SYNDROME: Primary | ICD-10-CM

## 2025-01-02 DIAGNOSIS — J45.31 MILD PERSISTENT ASTHMA WITH ACUTE EXACERBATION: Primary | ICD-10-CM

## 2025-01-02 PROCEDURE — 99999 PR PBB SHADOW E&M-EST. PATIENT-LVL V: CPT | Mod: PBBFAC,,, | Performed by: STUDENT IN AN ORGANIZED HEALTH CARE EDUCATION/TRAINING PROGRAM

## 2025-01-02 RX ORDER — FLUTICASONE PROPIONATE 50 MCG
2 SPRAY, SUSPENSION (ML) NASAL 2 TIMES DAILY
Qty: 1 ML | Refills: 6 | Status: SHIPPED | OUTPATIENT
Start: 2025-01-02

## 2025-01-02 RX ORDER — BUDESONIDE AND FORMOTEROL FUMARATE DIHYDRATE 160; 4.5 UG/1; UG/1
2 AEROSOL RESPIRATORY (INHALATION) EVERY 12 HOURS
Qty: 1 G | Refills: 11 | Status: SHIPPED | OUTPATIENT
Start: 2025-01-02 | End: 2026-01-02

## 2025-01-02 RX ORDER — ESCITALOPRAM OXALATE 20 MG/1
20 TABLET, FILM COATED ORAL
COMMUNITY
Start: 2024-12-19

## 2025-01-02 RX ORDER — PREDNISONE 20 MG/1
20 TABLET ORAL DAILY
Qty: 7 TABLET | Refills: 0 | Status: SHIPPED | OUTPATIENT
Start: 2025-01-02 | End: 2025-01-09

## 2025-01-02 RX ORDER — IPRATROPIUM BROMIDE AND ALBUTEROL SULFATE 2.5; .5 MG/3ML; MG/3ML
3 SOLUTION RESPIRATORY (INHALATION) EVERY 6 HOURS PRN
Qty: 75 ML | Refills: 0 | Status: SHIPPED | OUTPATIENT
Start: 2025-01-02 | End: 2026-01-02
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
## 2025-01-29 RX ORDER — BUDESONIDE AND FORMOTEROL FUMARATE DIHYDRATE 160; 4.5 UG/1; UG/1
2 AEROSOL RESPIRATORY (INHALATION) EVERY 12 HOURS
Qty: 1 G | Refills: 11 | Status: SHIPPED | OUTPATIENT
Start: 2025-01-29 | End: 2026-01-29

## 2025-01-30 ENCOUNTER — TELEPHONE (OUTPATIENT)
Dept: PULMONOLOGY | Facility: CLINIC | Age: 35
End: 2025-01-30
Payer: COMMERCIAL

## 2025-02-25 ENCOUNTER — TELEPHONE (OUTPATIENT)
Dept: PULMONOLOGY | Facility: CLINIC | Age: 35
End: 2025-02-25
Payer: COMMERCIAL

## 2025-02-26 ENCOUNTER — CLINICAL SUPPORT (OUTPATIENT)
Dept: PULMONOLOGY | Facility: CLINIC | Age: 35
End: 2025-02-26
Payer: COMMERCIAL

## 2025-02-26 VITALS — HEIGHT: 64 IN | RESPIRATION RATE: 16 BRPM | BODY MASS INDEX: 35.95 KG/M2

## 2025-02-26 DIAGNOSIS — J45.21 MILD INTERMITTENT ASTHMA WITH ACUTE EXACERBATION: ICD-10-CM

## 2025-02-26 PROCEDURE — 99999 PR PBB SHADOW E&M-EST. PATIENT-LVL III: CPT | Mod: PBBFAC,,,

## 2025-02-26 NOTE — PATIENT INSTRUCTIONS
Understanding Asthma         Asthma is a long-term (chronic) lung condition. It involves the airways (bronchial tubes). It happens when a trigger causes your airways to swell and become narrow. The muscles around your airways start to tighten. When your airways start to narrow, air can't move in and out of your lungs very well. Mucus also builds up along the airways. This makes it even harder to move air in and out of your lungs.  Experts are not exactly sure what causes asthma. It may be caused by a mix of inherited and environmental factors. People with asthma may have no symptoms until they are exposed to an allergen or trigger.  Healthy lungs  Inside your lungs there are branching airways made of stretchy tissue. Each airway is wrapped with bands of muscle. The airways are smaller as they go deeper into the lungs. The smallest airways end in clusters of tiny balloon-like air sacs (alveoli). These clusters are surrounded by blood vessels. When you breathe in (inhale), air enters the lungs. It travels down through the airways until it reaches the air sacs. When you breathe out (exhale), air travels up through the airways and out of the lungs. The airways make mucus that traps particles you breathe in. Normally, the mucus is then swept out of the lungs by tiny hairs (cilia) that line the airways. The mucus is swallowed or coughed up during your day.    What the lungs do  The air you inhale contains oxygen. When oxygen reaches the air sacs, it passes into the blood vessels around the sacs. Your blood then sends oxygen to all of your cells. As you exhale, carbon dioxide is removed in a similar way from the blood in the air sacs, and from your body.    When you have asthma  People with asthma have very sensitive airways. This means the airways react to certain things called triggers. Triggers can include pollen, dust, or smoke. Triggers cause inflammation. This makes the airways swell and become narrow. This is a  long-lasting (chronic) problem. Your airways may not always be narrow enough so that you notice breathing problems.  Symptoms of chronic inflammation include:   Coughing (chronic)   A feeling of tightness in your chest   Feeling short of breath   Wheezing (a whistling noise, especially when breathing out)   Low energy or feeling tired   In some people, over time chronic mild inflammation can lead to lasting (permanent) scarring of airways and loss of lung function.    Asthma flare-ups  When sensitive airways are irritated by a trigger, the muscles around the airways tighten. The lining of the airways swells. Thick, sticky mucus increases and partly clogs the airways. All of this makes it harder to breathe.  Symptoms of flare-ups may include:  Coughing, especially at night. You may not be able to sleep because of coughing.   Getting tired or out of breath easily   Wheezing   Chest tightness   Faster breathing when at rest   Flare-ups can be life-threatening. In a severe flare-up, the muscle tightening, swelling, and mucus are worse. Its very hard to breathe. Your body can't get enough oxygen and can't remove carbon dioxide. Waste gas is trapped in the alveoli. Gas exchange cant occur. The body is not getting enough oxygen. Without oxygen, body tissues, especially brain tissue, begin to get damaged. If this goes on for long, it can lead to severe brain damage or death.  Call 911 (or have someone call for you) if you have any of these symptoms and they are not relieved right away by taking your quick-relief medicine as prescribed:  Trouble breathing   Feeling too short of breath to talk or walk   Lips or fingers turning blue   Feeling lightheaded or dizzy, as though you are about to pass out   Peak flow less than 50% of your personal best, if you use a peak flow meter     Managing your asthma  Asthma is a long-term condition. So its important to work with your healthcare provider to manage it. If you have asthma,  you can prevent flare-ups. Develop an asthma action plan with your healthcare provider. It can help control your asthma and manage your symptoms. An asthma action plan also tells you and your family or friends what to do if your asthma flares up or gets worse.  Take your medicine as prescribed. Also learn about your asthma triggers. Knowing what causes your asthma to flare up in the first place can help you prevent future breathing problems.  If you smoke, get help to quit.Using an Inhaler with a Spacer  To control asthma, you need to use your medicines the right way. Some medicines are inhaled using a device called a metered-dose inhaler (MDI). Metered-dose inhalers deliver medicine with a fine spray. You may be asked to use a spacer (holding tube) with your inhaler. The spacer helps make sure all the medicine you need goes into your lungs.   Steps for using an inhaler with a spacer  Step 1:  Remove the caps from the inhaler and spacer.  Shake the inhaler well and attach the spacer. If the inhaler is being used for the first time or has not been used for a while, prime it as directed by the product maker.  Step 2:  Breathe out normally.  Put the spacer between your teeth. Close your lips tightly around it.  Keep your chin up.  Step 3:  Spray 1 puff into the spacer by pressing down on the inhaler.  Then breathe in through your mouth as slowly and deeply as you can. This should take about 5-10 seconds. If you breathe too quickly, you may hear a whistling sound in certain spacers.  Step 4:  Take the spacer out of your mouth.  Hold your breath for a count of 10.  Then hold your lips together and slowly breathe out through your mouth  Asthma Trigger Checklist  Allergens, irritants, and other things may trigger your asthma. Check the box next to each of your triggers. After each trigger is a list of ways to avoid it.   Dust mites. Dust mites live in mattresses, bedding, carpets, curtains, and indoor dust.  To kill dust  mites, wash bedding in hot water (130°F) each week.  Cover mattress and pillows with special dust-mite-proof cases.  Don't use upholstered furniture like sofas or chairs in the bedroom.  Use allergy-proof filters for air conditioners and furnaces. Replace or clean them as instructed.  If you can, replace carpeting with wood or tile josefa, especially in the bedroom.  Animals. Animals with fur or feathers shed dander (allergens).  It's best to choose a pet that doesn't have fur or feathers, such as a fish or a reptile.  If you have pets, keep them off your bed and out of your bedroom.  Wash your hands and clothes after handling pets.    Mold. Mold grows in damp places, such as bathrooms, basements, and closets.  Ask someone to clean damp areas in your home every week. Or try wearing a face mask while you clean.  Run an exhaust fan while bathing. Or leave a window open in the bathroom.  Repair water leaks in or around your home.  Have someone else cut grass or rake leaves, if possible.  Don't use vaporizers or humidifiers. They encourage mold growth.    Pollen. Pollen from trees, grasses, and weeds is a common allergen. (Flower pollens are generally not a problem).  Try to learn what types of pollen affect you most. Pollen levels vary depending on the plant, the season, and the time of day.  If possible, use air conditioning instead of opening the windows in your home or car.  Have someone else do yard work, if possible.    Cockroaches. Roaches are found in many homes and produce allergens.  Keep your kitchen clean and dry. A leaky faucet or drain can attract roaches.  Remove garbage from your home daily.  Store food in tightly sealed containers. Wash dishes as soon as they are used.  Use bait stations or traps to control roaches. Avoid using chemical sprays.    Smoke. Smoke may be from cigarettes, cigars, pipes, incense or candles, barbecues or grills, and fireplaces.  Don't smoke. And don't let people smoke in  your home or car.  When you travel, ask for nonsmoking rental cars and hotel rooms.  Avoid fireplaces and wood stoves. If you can't, sit away from them. Make sure the smoke is directed outside.  Don't burn incense or use candles.  Move away from smoky outdoor cooking grills.    Smog.  Smog is from car exhaust and other pollution.  Read or listen to local air-quality reports. These let you know when air quality is poor.  Stay indoors as much as you can on smoggy days. If possible, use air conditioning instead of opening the windows.  In your car, set air conditioning to recirculate air, so less pollution gets in.    Strong odors. These include air fresheners, deodorizers, and cleaning products; perfume, deodorant, and other beauty products; incense and candles; and insect sprays and other sprays.  Use scent-free products like deodorant or body lotion.  Avoid using cleaning products with bleach and ammonia. Make your own cleaning solution with white vinegar, baking soda, or mild dish soap.  Use exhaust fans while cooking. Or open a window, if possible.   Avoid perfumes, air fresheners, potpourri, and other scented products.          Other irritants. These include dust, aerosol sprays, and powders.  Wear a face mask while doing tasks like sanding, dusting, sweeping, and yard work. Open doors and windows if working indoors.  Use pump spray bottles instead of aerosols.  Pour liquid  onto a rag or cloth instead of spraying them.    Weather. Weather conditions can trigger symptoms or make them worse.  Watch for very high or low temperatures, very humid conditions, or a lot of wind, as these conditions can make symptoms worse.  Limit outdoor activity during the type of weather that affects you.  Wear a scarf over your mouth and nose in cold weather.    Colds, flu, and sinus infections. Upper respiratory infections can trigger asthma.  Wash your hands often with soap and warm water or use a hand  containing  alcohol.  Get a yearly flu shot. And ask if you should get a pneumonia vaccine.  Take care of your general health. Get plenty of sleep. And eat a variety of healthy foods.    Food additives. Food additives can trigger asthma flare-ups in some people.  Check food labels for sulfites or other similar ingredients. These are often found in foods such as wine, beer, and dried fruits.  Avoid foods that contain these additives.    Medicine. Aspirin, NSAIDS like ibuprofen and naproxen, and heart medicines like beta-blockers may be triggers.  Tell your health care provider if you think certain medicines trigger symptoms.   Be sure to read the labels on over-the-counter medicines. They may have ingredients that cause symptoms for you.   , Emotions. Laughing, crying, or feeling excited are triggers for some people.   To help you stay calm: Try breathing in slowly through your nose for a count of 2 seconds. Close your lips and breathe out for 4 seconds. Repeat.  Try to focus on a soothing image in your mind. This will help relax you and calm your breathing.  Remember to take your daily controller medicines. When you're upset or under stress, it's easy to forget.    Exercise. For some people, exercise can trigger symptoms. Don't let this keep you from being active.   If you have not been exercising regularly, start slow and work up gradually.  Take all of your medicines as prescribed.  If you use quick-relief medicine, make sure you have it with you when you exercise.  Stop if you have any symptoms. Make sure you talk with your provider about these symptoms.  © 8934-4099 [employer]. [address] [place] [address], [place] [address]. All rights reserved. This information is not intended as a substitute for professional medical care. Always follow your healthcare professional's instructions.          ACTION PLAN    GREEN ZONE  My sputum is clear/white/usual color and easily cleared.  My breathing is no harder than usual.  I  can do my usual activities.  I can think clearly.   Take your usual medicines, including oxygen, as you are told to do so by your health care provider.   YELLOW ZONE  My sputum has change (color, thickness, amount).  I am more short of breath than usual.  I cough or wheeze more.  I weigh more and my legs/feet swell.  I cannot do my usual activities without resting.   Call your health care provider. You will probably be told to begin taking an antibiotic and prednisone. Have your pharmacy phone number available.   RED ZONE  I cannot cough out my sputum.  I am much more short of breath than normal.  I need to sit up to breathe  I cannot do my usual activities.  I am unable to speak more than one or two words at a time.  I am confused.   Call your health care provider. You may be asked to come in to be seen, told to go to the emergency room, or told to call 9-1-1.

## 2025-02-26 NOTE — PROGRESS NOTES
Pulmonary Disease Management  [facility]  Initial Visit       Diagnosis: Intermittent Asthma   Last Hospital Admission: 7/26/23  Last provider visit: 1/2/25      Current Outpatient Medications:     albuterol (PROVENTIL/VENTOLIN HFA) 90 mcg/actuation inhaler, Inhale 2 puffs into the lungs every 6 (six) hours as needed for Wheezing., Disp: 18 g, Rfl: 1    ALBUTEROL INHL, Inhale into the lungs., Disp: , Rfl:     albuterol-ipratropium (DUO-NEB) 2.5 mg-0.5 mg/3 mL nebulizer solution, Take 3 mLs by nebulization every 6 (six) hours as needed for Wheezing. Rescue, Disp: 75 mL, Rfl: 0    atogepant (QULIPTA) 60 mg Tab, Take 1 tablet by mouth once daily., Disp: , Rfl:     budesonide-formoterol 160-4.5 mcg (SYMBICORT) 160-4.5 mcg/actuation HFAA, Inhale 2 puffs into the lungs every 12 (twelve) hours. Controller, Disp: 1 g, Rfl: 11    chlorthalidone (HYGROTEN) 25 MG Tab, Take 1 tablet (25 mg total) by mouth once daily., Disp: 90 tablet, Rfl: 3    cholestyramine-aspartame (CHOLESTYRAMINE LIGHT) 4 grams of anhydrous PwPk, Take 1 packet (4 grams of anhydrous cholestyramine total) by mouth 2 (two) times daily before meals. (Patient not taking: Reported on 12/18/2024), Disp: 180 packet, Rfl: 3    fluticasone propionate (FLONASE) 50 mcg/actuation nasal spray, 2 sprays (100 mcg total) by Each Nostril route 2 (two) times daily., Disp: 1 mL, Rfl: 6    gabapentin (NEURONTIN) 300 MG capsule, Take 300 mg by mouth 2 (two) times daily as needed., Disp: , Rfl:     LEXAPRO 20 mg tablet, Take 20 mg by mouth., Disp: , Rfl:     LORazepam (ATIVAN) 0.5 MG tablet, Take 0.5 mg by mouth daily as needed., Disp: , Rfl:     metFORMIN (GLUCOPHAGE) 500 MG tablet, Take 1 tablet (500 mg total) by mouth 2 (two) times daily with meals. Take 1 tablet with food for 1 week then may increase to 1 tablet BID with meals (Patient not taking: Reported on 12/18/2024), Disp: 180 tablet, Rfl: 3    montelukast (SINGULAIR) 10 mg tablet, Take 1 tablet (10 mg total)  "by mouth every evening., Disp: 90 tablet, Rfl: 2    multivitamin-Ca-iron-minerals 18-0.4 mg Tab, Take 1 tablet by mouth once daily., Disp: , Rfl:     olmesartan (BENICAR) 40 MG tablet, Take 1 tablet (40 mg total) by mouth once daily., Disp: 90 tablet, Rfl: 3    ondansetron (ZOFRAN) 4 MG tablet, , Disp: , Rfl:     pantoprazole (PROTONIX) 40 MG tablet, TAKE 1 TABLET(40 MG) BY MOUTH EVERY MORNING, Disp: 90 tablet, Rfl: 3    rimegepant (NURTEC) 75 mg odt, DISSOLVE ONE TABLET BY MOUTH EVERY DAY AT ONSET OF MIGRAINE. NO MORE THAT 3 DAYS/WEEK, Disp: , Rfl:     Review of patient's allergies indicates:   Allergen Reactions    Aspirin Hives, Other (See Comments) and Rash       Smoking history:   Social History     Tobacco Use   Smoking Status Never   Smokeless Tobacco Never     Resp: 16  Height: 5' 4" (162.6 cm)    ACT Questionnaire:  Asthma Control Test  In the past 4  weeks, how much of the time did your asthma keep you from getting as much done at work, school or at home?: A little of the time  During the past 4 weeks, how often have you had shortness of breath?: Once or twice a week  During the past 4 weeks, how often did your asthma symptoms (wheezing, couging, shortness of breath, chest tightness or pain) wake you up at night or earlier than usual in the morning?: Once or twice  During the past 4 weeks, how often have you used your rescue inhaler or nebulizer medication (such as albuterol)?: Once a week or less  How would you rate your asthma control during the past 4 weeks?: Somewhat controlled  If your score is 19 or less, your asthma may not be under control: 19       PFT Results:  Pre FVC   Date/Time Value Ref Range Status   12/20/2024 09:32 AM 3.85 L Final     Pre FEV1   Date/Time Value Ref Range Status   12/20/2024 09:32 AM 3.46 L Final     Pre FEV1 FVC   Date/Time Value Ref Range Status   12/20/2024 09:32 AM 89.69 % Final     Pre TLC   Date/Time Value Ref Range Status   12/20/2024 09:32 AM 6.07 L Final     Pre " DLCO   Date/Time Value Ref Range Status   12/20/2024 09:32 AM 19.02 ml/(min*mmHg) Final         Educational assessment:   [x]            Good  []            Fair  []            Poor    Readiness to learn:   [x]            Good  []            Fair  []            Poor    Vision Status:   [x]            Good  []            Fair  []            Poor    Reading Ability:  [x]            Good  []            Fair  []            Poor    Knowledge of condition:   [x]            Good  []            Fair  []            Poor    Language Barriers:   []            Good  []            Fair  []            Poor  [x]            None    Cognitive/ Physical Barriers:   []            Good  []            Fair  []            Poor  [x]            None    Learning best by:                       [x]            Seeing  []            Hearing  []            Reading                         [x]            Doing    Describe any barrier /Limitation or financial implications of care choices identified     []            Financial  []            Emotional  []            Education  []            Vision/Hearing  []            Physical  [x]            None  []                TOPIC /CONTENT FOR TODAY:    [x]            MDI with or without spacer  []            Dry powder inhaler  []            Acapella   []           Peak Flow meter  [x]            COPD action plan  []            Nebulizer use  []            Oxygen use safety  []            Breathing and cough techniques  [x]            Energy conservation  [x]            Infection prevention  []           OTHER________________________        Learner:    [x]            Patient   []            Caregiver    Method:    [x]            Verbal explanation  [x]            Audio visual    [x]            Literature  [x]            Teach back      Evaluation:    [x]            Teach back  [x]            Demonstrate  [x]            Follow up phone call    []            2 weeks     [x]            4 weeks   [x]             PRN      Patient Concerns or Expectations:   Patient has concerns regarding cost of Symbicort HFA.  Contacted [facility]. Confirmed generic is not covered. Provided pharmacist with coupon code. Medication copay will be $35 per month going forward.   Patient stated understanding.     Therapist Comments:   Patient was seen today to review respiratory medication purpose and proper technique for use of inhalers. Patient practiced proper technique using MDI with valved holding chamber (spacer) and DPI inhalers. Patient demonstrated understanding. Literature was given to patient.     Asthma trigger checklist was verbally reviewed and literature given to patient.     Infection prevention was discussed. Patient was reminded to get influenza vaccine. Hand hygiene and cleaning of respiratory equipment was also discussed. Patient verbalized understanding.      Asthma action plan was reviewed and literature was given to patient. Patient verbalized understanding.     Plan:  Monthly Pulmonary Disease Management Questionnaire  Follow-up PDM appointment scheduled for 8/27/25  Reinforce education  Meds: Symbicort, albuterol, Duoneb   DME Needs: [facility]   Action Plan  Immunization: Pneumococcal- DUE, Flu-DUE   Next Provider Visit: 4/3/25  Next Spirometry/CPFT: 4/3/25  Approximate time spent with patient: 60 mins      Cardiac

## 2025-02-28 ENCOUNTER — PATIENT MESSAGE (OUTPATIENT)
Dept: PULMONOLOGY | Facility: CLINIC | Age: 35
End: 2025-02-28
Payer: COMMERCIAL

## 2025-03-02 DIAGNOSIS — I10 PRIMARY HYPERTENSION: Chronic | ICD-10-CM

## 2025-03-04 NOTE — TELEPHONE ENCOUNTER
Care Due:                  Date            Visit Type   Department     Provider  --------------------------------------------------------------------------------                                EP -                              PRIMARY      [facility]  Last Visit: 12-      CARE ([facility])   MEDICINE       Luis Alberto Cox  Next Visit: None Scheduled  None         None Found                                                            Last  Test          Frequency    Reason                     Performed    Due Date  --------------------------------------------------------------------------------    Office Visit  15 months..  pantoprazole.............  12- 03-    [facility] Embedded Care Due Messages. Reference number: [id number].   3/04/2025 1:24:29 PM CST

## 2025-03-04 NOTE — TELEPHONE ENCOUNTER
Refill Routing Note   Medication(s) are not appropriate for processing by [facility] for the following reason(s):        Patient not seen by provider within 15 months    [facility] action(s):  Defer   Requires appointment : Yes             Appointments  past 12m or future 3m with PCP    Date Provider   Last Visit   12/7/2023 RAJAT Cox MD   Next Visit   Visit date not found RAJAT Cox MD   ED visits in past 90 days: 0        Note composed:1:43 PM 03/04/2025

## 2025-03-06 ENCOUNTER — PATIENT MESSAGE (OUTPATIENT)
Dept: ADMINISTRATIVE | Facility: HOSPITAL | Age: 35
End: 2025-03-06
Payer: COMMERCIAL

## 2025-03-06 ENCOUNTER — PATIENT MESSAGE (OUTPATIENT)
Dept: PULMONOLOGY | Facility: CLINIC | Age: 35
End: 2025-03-06
Payer: COMMERCIAL

## 2025-03-07 ENCOUNTER — TELEPHONE (OUTPATIENT)
Dept: INTERNAL MEDICINE | Facility: CLINIC | Age: 35
End: 2025-03-07
Payer: COMMERCIAL

## 2025-03-07 ENCOUNTER — PATIENT OUTREACH (OUTPATIENT)
Dept: ADMINISTRATIVE | Facility: HOSPITAL | Age: 35
End: 2025-03-07
Payer: COMMERCIAL

## 2025-03-07 ENCOUNTER — PATIENT OUTREACH (OUTPATIENT)
Dept: PULMONOLOGY | Facility: CLINIC | Age: 35
End: 2025-03-07
Payer: COMMERCIAL

## 2025-03-07 ENCOUNTER — CLINICAL SUPPORT (OUTPATIENT)
Dept: INTERNAL MEDICINE | Facility: CLINIC | Age: 35
End: 2025-03-07
Payer: COMMERCIAL

## 2025-03-07 VITALS — SYSTOLIC BLOOD PRESSURE: 140 MMHG | DIASTOLIC BLOOD PRESSURE: 100 MMHG

## 2025-03-07 DIAGNOSIS — I10 PRIMARY HYPERTENSION: Primary | ICD-10-CM

## 2025-03-07 DIAGNOSIS — I10 PRIMARY HYPERTENSION: Primary | Chronic | ICD-10-CM

## 2025-03-07 PROCEDURE — 99999 PR PBB SHADOW E&M-EST. PATIENT-LVL I: CPT | Mod: PBBFAC,,,

## 2025-03-07 RX ORDER — OLMESARTAN MEDOXOMIL 40 MG/1
40 TABLET ORAL
Qty: 90 TABLET | Refills: 1 | Status: SHIPPED | OUTPATIENT
Start: 2025-03-07

## 2025-03-07 NOTE — TELEPHONE ENCOUNTER
Spoke with the patient informing her that she need a follow up blood pressure appointment. The patient was scheduled an appointment for 3/12/25 at 8:20 am but the patient was informed to arrive for 7:40 am due to the patient work schedule. The patient stated understanding. The patient was informed that Ms Du will call in a 5 day supply of her blood pressure medication. The patient was informed that the scheduled appointment on 3/12/25 she need to keep due to her no showing and canceling her appointments for her blood pressure. The patient stated understanding

## 2025-03-07 NOTE — TELEPHONE ENCOUNTER
"Chronic Disease Management  Called patient in regard to abnormal Pulmonary Disease Management Questionnaire:     Patient reports respiratory symptoms have "mostly" resolved since last week. Patient reports this week she has been unable to keep food down. Advised patient to seek medical attention if symptoms persist. Understanding was stated.     "

## 2025-03-07 NOTE — TELEPHONE ENCOUNTER
REFILL REQUEST APPROVED.  Requested Prescriptions     Pending Prescriptions Disp Refills    olmesartan (BENICAR) 40 MG tablet [Pharmacy Med Name: OLMESARTAN MEDOXOMIL 40MG TABLETS] 90 tablet 1     Sig: TAKE 1 TABLET BY MOUTH ONCE DAILY

## 2025-03-12 ENCOUNTER — PATIENT MESSAGE (OUTPATIENT)
Dept: INTERNAL MEDICINE | Facility: CLINIC | Age: 35
End: 2025-03-12

## 2025-03-12 ENCOUNTER — OFFICE VISIT (OUTPATIENT)
Dept: INTERNAL MEDICINE | Facility: CLINIC | Age: 35
End: 2025-03-12
Payer: COMMERCIAL

## 2025-03-12 ENCOUNTER — RESULTS FOLLOW-UP (OUTPATIENT)
Dept: INTERNAL MEDICINE | Facility: CLINIC | Age: 35
End: 2025-03-12

## 2025-03-12 VITALS
SYSTOLIC BLOOD PRESSURE: 160 MMHG | HEART RATE: 84 BPM | HEIGHT: 64 IN | OXYGEN SATURATION: 100 % | DIASTOLIC BLOOD PRESSURE: 104 MMHG | WEIGHT: 202.63 LBS | BODY MASS INDEX: 34.59 KG/M2

## 2025-03-12 DIAGNOSIS — T50.2X5A DIURETIC-INDUCED HYPOKALEMIA: ICD-10-CM

## 2025-03-12 DIAGNOSIS — Z13.31 POSITIVE DEPRESSION SCREENING: ICD-10-CM

## 2025-03-12 DIAGNOSIS — F43.21 SITUATIONAL DEPRESSION: ICD-10-CM

## 2025-03-12 DIAGNOSIS — B96.89 BACTERIAL CONJUNCTIVITIS OF BOTH EYES: ICD-10-CM

## 2025-03-12 DIAGNOSIS — J45.21 MILD INTERMITTENT ASTHMA WITH ACUTE EXACERBATION: ICD-10-CM

## 2025-03-12 DIAGNOSIS — E66.01 SEVERE OBESITY (BMI 35.0-39.9) WITH COMORBIDITY: ICD-10-CM

## 2025-03-12 DIAGNOSIS — Z76.0 ENCOUNTER FOR MEDICATION REFILL: ICD-10-CM

## 2025-03-12 DIAGNOSIS — Z23 NEED FOR INFLUENZA VACCINATION: ICD-10-CM

## 2025-03-12 DIAGNOSIS — E87.6 DIURETIC-INDUCED HYPOKALEMIA: Primary | ICD-10-CM

## 2025-03-12 DIAGNOSIS — I10 PRIMARY HYPERTENSION: Primary | Chronic | ICD-10-CM

## 2025-03-12 DIAGNOSIS — R73.03 PREDIABETES: ICD-10-CM

## 2025-03-12 DIAGNOSIS — T78.00XA ANAPHYLACTIC REACTION DUE TO FOOD: ICD-10-CM

## 2025-03-12 DIAGNOSIS — H10.9 BACTERIAL CONJUNCTIVITIS OF BOTH EYES: ICD-10-CM

## 2025-03-12 DIAGNOSIS — Z23 ENCOUNTER FOR PREVNAR PNEUMOCOCCAL VACCINATION: ICD-10-CM

## 2025-03-12 DIAGNOSIS — E87.6 DIURETIC-INDUCED HYPOKALEMIA: ICD-10-CM

## 2025-03-12 DIAGNOSIS — T50.2X5A DIURETIC-INDUCED HYPOKALEMIA: Primary | ICD-10-CM

## 2025-03-12 LAB
ANION GAP SERPL CALC-SCNC: 13 MMOL/L (ref 8–16)
BUN SERPL-MCNC: 14 MG/DL (ref 6–20)
CALCIUM SERPL-MCNC: 9.7 MG/DL (ref 8.7–10.5)
CHLORIDE SERPL-SCNC: 100 MMOL/L (ref 95–110)
CO2 SERPL-SCNC: 25 MMOL/L (ref 23–29)
CREAT SERPL-MCNC: 0.9 MG/DL (ref 0.5–1.4)
EST. GFR  (NO RACE VARIABLE): >60 ML/MIN/1.73 M^2
ESTIMATED AVG GLUCOSE: 128 MG/DL (ref 68–131)
GLUCOSE SERPL-MCNC: 78 MG/DL (ref 70–110)
HBA1C MFR BLD: 6.1 % (ref 4–5.6)
POTASSIUM SERPL-SCNC: 2.9 MMOL/L (ref 3.5–5.1)
SODIUM SERPL-SCNC: 138 MMOL/L (ref 136–145)

## 2025-03-12 PROCEDURE — G2211 COMPLEX E/M VISIT ADD ON: HCPCS | Mod: S$GLB,,, | Performed by: NURSE PRACTITIONER

## 2025-03-12 PROCEDURE — 99214 OFFICE O/P EST MOD 30 MIN: CPT | Mod: 25,S$GLB,, | Performed by: NURSE PRACTITIONER

## 2025-03-12 PROCEDURE — 90472 IMMUNIZATION ADMIN EACH ADD: CPT | Mod: S$GLB,,, | Performed by: NURSE PRACTITIONER

## 2025-03-12 PROCEDURE — 90471 IMMUNIZATION ADMIN: CPT | Mod: S$GLB,,, | Performed by: NURSE PRACTITIONER

## 2025-03-12 PROCEDURE — 3077F SYST BP >= 140 MM HG: CPT | Mod: CPTII,S$GLB,, | Performed by: NURSE PRACTITIONER

## 2025-03-12 PROCEDURE — 3044F HG A1C LEVEL LT 7.0%: CPT | Mod: CPTII,S$GLB,, | Performed by: NURSE PRACTITIONER

## 2025-03-12 PROCEDURE — 90656 IIV3 VACC NO PRSV 0.5 ML IM: CPT | Mod: S$GLB,,, | Performed by: NURSE PRACTITIONER

## 2025-03-12 PROCEDURE — 83036 HEMOGLOBIN GLYCOSYLATED A1C: CPT | Performed by: NURSE PRACTITIONER

## 2025-03-12 PROCEDURE — 99999 PR PBB SHADOW E&M-EST. PATIENT-LVL IV: CPT | Mod: PBBFAC,,, | Performed by: NURSE PRACTITIONER

## 2025-03-12 PROCEDURE — 1160F RVW MEDS BY RX/DR IN RCRD: CPT | Mod: CPTII,S$GLB,, | Performed by: NURSE PRACTITIONER

## 2025-03-12 PROCEDURE — 3080F DIAST BP >= 90 MM HG: CPT | Mod: CPTII,S$GLB,, | Performed by: NURSE PRACTITIONER

## 2025-03-12 PROCEDURE — 90677 PCV20 VACCINE IM: CPT | Mod: S$GLB,,, | Performed by: NURSE PRACTITIONER

## 2025-03-12 PROCEDURE — 3008F BODY MASS INDEX DOCD: CPT | Mod: CPTII,S$GLB,, | Performed by: NURSE PRACTITIONER

## 2025-03-12 PROCEDURE — 1159F MED LIST DOCD IN RCRD: CPT | Mod: CPTII,S$GLB,, | Performed by: NURSE PRACTITIONER

## 2025-03-12 PROCEDURE — 4010F ACE/ARB THERAPY RXD/TAKEN: CPT | Mod: CPTII,S$GLB,, | Performed by: NURSE PRACTITIONER

## 2025-03-12 PROCEDURE — 80048 BASIC METABOLIC PNL TOTAL CA: CPT | Performed by: NURSE PRACTITIONER

## 2025-03-12 RX ORDER — MOXIFLOXACIN 5 MG/ML
1 SOLUTION/ DROPS OPHTHALMIC 3 TIMES DAILY
Qty: 3 ML | Refills: 0 | Status: SHIPPED | OUTPATIENT
Start: 2025-03-12 | End: 2025-03-19

## 2025-03-12 RX ORDER — LORAZEPAM 0.5 MG/1
0.5 TABLET ORAL DAILY PRN
Qty: 7 TABLET | Refills: 0 | Status: SHIPPED | OUTPATIENT
Start: 2025-03-12 | End: 2025-03-19

## 2025-03-12 RX ORDER — ESCITALOPRAM OXALATE 20 MG/1
20 TABLET ORAL DAILY
Qty: 14 TABLET | Refills: 0 | Status: SHIPPED | OUTPATIENT
Start: 2025-03-12 | End: 2025-03-26

## 2025-03-12 RX ORDER — CHLORTHALIDONE 25 MG/1
25 TABLET ORAL DAILY
Qty: 90 TABLET | Refills: 3 | Status: SHIPPED | OUTPATIENT
Start: 2025-03-12 | End: 2025-03-12 | Stop reason: ALTCHOICE

## 2025-03-12 RX ORDER — POTASSIUM CHLORIDE 20 MEQ/1
TABLET, EXTENDED RELEASE ORAL
Qty: 194 TABLET | Refills: 1 | Status: SHIPPED | OUTPATIENT
Start: 2025-03-12 | End: 2025-03-12 | Stop reason: DRUGHIGH

## 2025-03-12 RX ORDER — METFORMIN HYDROCHLORIDE 500 MG/1
1000 TABLET, EXTENDED RELEASE ORAL
Qty: 180 TABLET | Refills: 1 | Status: SHIPPED | OUTPATIENT
Start: 2025-03-12

## 2025-03-12 RX ORDER — AMLODIPINE BESYLATE 10 MG/1
10 TABLET ORAL DAILY
Qty: 30 TABLET | Refills: 0 | Status: SHIPPED | OUTPATIENT
Start: 2025-03-12

## 2025-03-12 RX ORDER — OLMESARTAN MEDOXOMIL 40 MG/1
40 TABLET ORAL DAILY
Qty: 30 TABLET | Refills: 0 | Status: SHIPPED | OUTPATIENT
Start: 2025-03-12

## 2025-03-12 RX ORDER — EPINEPHRINE 0.3 MG/.3ML
INJECTION SUBCUTANEOUS
Qty: 2 EACH | Refills: 2 | Status: SHIPPED | OUTPATIENT
Start: 2025-03-12

## 2025-03-12 RX ORDER — POTASSIUM CHLORIDE 20 MEQ/1
40 TABLET, EXTENDED RELEASE ORAL 2 TIMES DAILY
Qty: 16 TABLET | Refills: 0 | Status: SHIPPED | OUTPATIENT
Start: 2025-03-12 | End: 2025-03-16

## 2025-03-12 NOTE — PROGRESS NOTES
"  Subjective:      Patient ID: Emeka Miramontes is a 34 y.o. female.    Chief Complaint: Follow-up and Hypertension    History of Present Illness    CHIEF COMPLAINT:  Emeka presents today for follow-up on various health concerns    MENTAL HEALTH:  She reports ongoing anxiety and depression symptoms but is not currently on psychiatric medications. Her last psychiatric appointment was in December. She reports crying episodes and emotional distress while attempting to maintain composure for others. She expresses frustration with psychiatric office staff communication.    RESPIRATORY:  She has a history of asthma with a recent asthma attack following a medical consultation. She was initially prescribed a steroid inhaler and later provided with a nebulizer machine while in rehabilitation.    MEDICATIONS:  She has Ativan prescribed as needed but is uncertain about its indication and is amenable to discontinuation.    ALLERGIES:  She reports allergies to mushrooms and bananas. She denies allergy to aspirin.          Problem List[1]    Current Medications[2]      Objective:   BP (!) 160/104 (BP Location: Left arm, Patient Position: Sitting)   Pulse 84   Ht 5' 4" (1.626 m)   Wt 91.9 kg (202 lb 9.6 oz)   LMP 02/25/2025 (Exact Date)   SpO2 100%   BMI 34.78 kg/m²     Physical Exam             Physical Exam  Vitals and nursing note reviewed.   Constitutional:       General: She is awake. She is not in acute distress.     Appearance: Normal appearance. She is well-developed and well-groomed. She is not ill-appearing, toxic-appearing or diaphoretic.   HENT:      Head: Normocephalic and atraumatic.      Right Ear: External ear normal.      Left Ear: External ear normal.   Eyes:      General:         Right eye: Discharge present.         Left eye: No discharge.      Conjunctiva/sclera:      Right eye: Right conjunctiva is injected. No exudate or hemorrhage.     Left eye: Left conjunctiva is not injected. No exudate or " hemorrhage.     Pupils: Pupils are equal, round, and reactive to light.   Cardiovascular:      Rate and Rhythm: Normal rate and regular rhythm.      Heart sounds: Normal heart sounds. No murmur heard.  Pulmonary:      Effort: Pulmonary effort is normal. No respiratory distress.      Breath sounds: Normal breath sounds. No stridor. No wheezing, rhonchi or rales.   Abdominal:      General: Abdomen is flat. Bowel sounds are normal.      Palpations: Abdomen is soft.   Musculoskeletal:         General: No swelling, tenderness, deformity or signs of injury.      Cervical back: Normal range of motion and neck supple.      Right lower leg: No edema.      Left lower leg: No edema.   Skin:     General: Skin is warm and dry.      Capillary Refill: Capillary refill takes less than 2 seconds.   Neurological:      General: No focal deficit present.      Mental Status: She is alert and oriented to person, place, and time.      Gait: Gait normal.   Psychiatric:         Attention and Perception: Attention and perception normal.         Mood and Affect: Mood normal. Affect is flat and tearful.         Speech: Speech normal.         Behavior: Behavior normal. Behavior is cooperative.         Cognition and Memory: Cognition normal.          Assessment/Plan :   1. Primary hypertension  -     Basic Metabolic Panel; Future; Expected date: 03/12/2025  -     chlorthalidone (HYGROTEN) 25 MG Tab; Take 1 tablet (25 mg total) by mouth once daily.  Dispense: 90 tablet; Refill: 3  -     olmesartan (BENICAR) 40 MG tablet; Take 1 tablet (40 mg total) by mouth once daily.  Dispense: 30 tablet; Refill: 0    2. Positive depression screening  Comments:  I have reviewed the positive depression score which warrants active treatment with psychotherapy and/or medications.  - Artur to follow up with Dr. Echevarria for further therapy and medication management.   - Received a one time limited prescription of lorazepam.     3. Severe obesity (BMI 35.0-39.9) with  comorbidity    4. Situational depression  -     LORazepam (ATIVAN) 0.5 MG tablet; Take 1 tablet (0.5 mg total) by mouth daily as needed for Anxiety.  Dispense: 7 tablet; Refill: 0  - Artur to follow up with Dr. Echevarria for further therapy and medication management.   - Received a one time limited prescription of lorazepam.     5. Prediabetes  -     Hemoglobin A1C; Future; Expected date: 03/12/2025    6. Bacterial conjunctivitis of both eyes  -     moxifloxacin (VIGAMOX) 0.5 % ophthalmic solution; Place 1 drop into both eyes 3 (three) times daily. for 7 days  Dispense: 3 mL; Refill: 0    7. Encounter for medication refill    9. Anaphylactic reaction due to food  -     EPINEPHrine (EPIPEN 2-RUDI) 0.3 mg/0.3 mL AtIn; Inject underneath skin according to directions on packaging once as needed for severe allergic reaction, then call 911.  Dispense: 2 each; Refill: 2    10. Mild intermittent asthma with acute exacerbation  -     pneumoc 20-gay conj-dip cr(PF) (PREVNAR-20 (PF)) injection Syrg 0.5 mL  - chronic  - recent exacerbation   - stable at this time    11. Encounter for Prevnar pneumococcal vaccination  -     pneumoc 20-gay conj-dip cr(PF) (PREVNAR-20 (PF)) injection Syrg 0.5 mL    12. Need for influenza vaccination  -     influenza (Flulaval, Fluzone, Fluarix) 45 mcg/0.5 mL IM vaccine (> or = 6 mo) 0.5 mL         Assessment & Plan    Assessed patient's medication regimen and adjusted as needed, including Lexapro, lorazepam (Ativan), Benicar, and Chlorthalidone  Evaluated patient's blood pressure, noting elevated readings (152/108, 160/104)  Considered patient's reported allergies to aspirin, mushrooms, and bananas  Recommend pneumococcal vaccine to prevent severe pneumonia  Deferred flu vaccine due to patient's current symptoms    ANXIETY DISORDER:  - Continued lorazepam (Ativan): provided 7-day supply.  - Emeka reports feeling anxious and being easily irritated by people, including customers at work.  - Recorded  elevated blood pressure readings of 152/108 and 160/104, possibly due to anxiety.  - Acknowledged the patient's anxiety and discussed the need for medication management.  - Refilled Lexapro (antidepressant) and Lorazepam (anti-anxiety medication) for short-term use.  - Noted the patient has an upcoming appointment with a psychiatrist.  - Instructed the patient to monitor anxiety symptoms and report any changes or side effects.    DEPRESSION:  - Continued Lexapro: provided 14-day supply.  - Emeka reports crying frequently and feeling emotionally triggered by recent events.  - Observed physical signs of emotional distress, including puffy eyes from crying.  - Acknowledged the patient's depressive symptoms and discussed the need for medication management.  - Refilled Lexapro (antidepressant) for 14 days.  - Noted the patient has an upcoming appointment with a psychiatrist.  - Advised the patient to track mood changes and report any worsening symptoms.    ASTHMA:  - Emeka had a recent asthma attack and is being monitored by a respiratory therapist.  - Noted previous symptoms of chest pain and difficulty breathing.  - Dr. Cash is managing the patient's asthma treatment, starting with higher doses and adjusting as needed.  - Prescribed a steroid inhaler and provided a nebulizer machine for asthma management.  - Instructed the patient on proper use of the inhaler and nebulizer.  - Advised the patient to keep a symptom diary and report any worsening of asthma symptoms.  - Scheduled a follow-up visit in 2 weeks to assess treatment efficacy.    FOOD ALLERGIES:  - Refilled EpiPen prescription.  - Updated the patient's medical record with allergies to mushrooms and bananas.  - Educated the patient on proper use and storage of the EpiPen.  - Advised the patient to wear a medical alert bracelet indicating food allergies.  - Instructed the patient to avoid known allergens and seek immediate medical attention if an allergic  reaction occurs.  - Recommend the patient to carry the EpiPen at all times.          Follow up if symptoms worsen or fail to improve.    This note was generated with the assistance of ambient listening technology. Verbal consent was obtained by the patient and accompanying visitor(s) for the recording of patient appointment to facilitate this note. I attest to having reviewed and edited the generated note for accuracy, though some syntax or spelling errors may persist. Please contact the author of this note for any clarification.            [1]   Patient Active Problem List  Diagnosis    Dysmenorrhea    Primary hypertension    Migraine with aura and without status migrainosus, not intractable    Weight loss, unintentional    Prediabetes    Elevated serum protein level    At risk for medication nonadherence    Gastroesophageal reflux disease without esophagitis    Anemia    Severe obesity (BMI 35.0-39.9) with comorbidity    Mild intermittent asthma with acute exacerbation    Anaphylactic reaction due to food    Situational depression   [2]   Current Outpatient Medications:     albuterol (PROVENTIL/VENTOLIN HFA) 90 mcg/actuation inhaler, Inhale 2 puffs into the lungs every 6 (six) hours as needed for Wheezing., Disp: 18 g, Rfl: 1    ALBUTEROL INHL, Inhale into the lungs., Disp: , Rfl:     albuterol-ipratropium (DUO-NEB) 2.5 mg-0.5 mg/3 mL nebulizer solution, Take 3 mLs by nebulization every 6 (six) hours as needed for Wheezing. Rescue, Disp: 75 mL, Rfl: 0    atogepant (QULIPTA) 60 mg Tab, Take 1 tablet by mouth once daily., Disp: , Rfl:     budesonide-formoterol 160-4.5 mcg (SYMBICORT) 160-4.5 mcg/actuation HFAA, Inhale 2 puffs into the lungs every 12 (twelve) hours. Controller, Disp: 1 g, Rfl: 11    cholestyramine-aspartame (CHOLESTYRAMINE LIGHT) 4 grams of anhydrous PwPk, Take 1 packet (4 grams of anhydrous cholestyramine total) by mouth 2 (two) times daily before meals., Disp: 180 packet, Rfl: 3    fluticasone  propionate (FLONASE) 50 mcg/actuation nasal spray, 2 sprays (100 mcg total) by Each Nostril route 2 (two) times daily., Disp: 1 mL, Rfl: 6    gabapentin (NEURONTIN) 300 MG capsule, Take 300 mg by mouth 2 (two) times daily as needed., Disp: , Rfl:     metFORMIN (GLUCOPHAGE) 500 MG tablet, Take 1 tablet (500 mg total) by mouth 2 (two) times daily with meals. Take 1 tablet with food for 1 week then may increase to 1 tablet BID with meals, Disp: 180 tablet, Rfl: 3    montelukast (SINGULAIR) 10 mg tablet, Take 1 tablet (10 mg total) by mouth every evening., Disp: 90 tablet, Rfl: 2    multivitamin-Ca-iron-minerals 18-0.4 mg Tab, Take 1 tablet by mouth once daily., Disp: , Rfl:     ondansetron (ZOFRAN) 4 MG tablet, , Disp: , Rfl:     pantoprazole (PROTONIX) 40 MG tablet, TAKE 1 TABLET(40 MG) BY MOUTH EVERY MORNING, Disp: 90 tablet, Rfl: 3    rimegepant (NURTEC) 75 mg odt, DISSOLVE ONE TABLET BY MOUTH EVERY DAY AT ONSET OF MIGRAINE. NO MORE THAT 3 DAYS/WEEK, Disp: , Rfl:     chlorthalidone (HYGROTEN) 25 MG Tab, Take 1 tablet (25 mg total) by mouth once daily., Disp: 90 tablet, Rfl: 3    EPINEPHrine (EPIPEN 2-RUDI) 0.3 mg/0.3 mL AtIn, Inject underneath skin according to directions on packaging once as needed for severe allergic reaction, then call 911., Disp: 2 each, Rfl: 2    EScitalopram oxalate (LEXAPRO) 20 MG tablet, Take 1 tablet (20 mg total) by mouth once daily. for 14 days, Disp: 14 tablet, Rfl: 0    LORazepam (ATIVAN) 0.5 MG tablet, Take 1 tablet (0.5 mg total) by mouth daily as needed for Anxiety., Disp: 7 tablet, Rfl: 0    moxifloxacin (VIGAMOX) 0.5 % ophthalmic solution, Place 1 drop into both eyes 3 (three) times daily. for 7 days, Disp: 3 mL, Rfl: 0    olmesartan (BENICAR) 40 MG tablet, Take 1 tablet (40 mg total) by mouth once daily., Disp: 30 tablet, Rfl: 0  No current facility-administered medications for this visit.

## 2025-03-17 ENCOUNTER — LAB VISIT (OUTPATIENT)
Dept: LAB | Facility: HOSPITAL | Age: 35
End: 2025-03-17
Attending: NURSE PRACTITIONER
Payer: COMMERCIAL

## 2025-03-17 DIAGNOSIS — T50.2X5A DIURETIC-INDUCED HYPOKALEMIA: ICD-10-CM

## 2025-03-17 DIAGNOSIS — E87.6 DIURETIC-INDUCED HYPOKALEMIA: ICD-10-CM

## 2025-03-17 LAB
MAGNESIUM SERPL-MCNC: 1.9 MG/DL (ref 1.6–2.6)
POTASSIUM SERPL-SCNC: 4.1 MMOL/L (ref 3.5–5.1)

## 2025-03-17 PROCEDURE — 36415 COLL VENOUS BLD VENIPUNCTURE: CPT | Performed by: NURSE PRACTITIONER

## 2025-03-17 PROCEDURE — 84132 ASSAY OF SERUM POTASSIUM: CPT | Performed by: NURSE PRACTITIONER

## 2025-03-17 PROCEDURE — 83735 ASSAY OF MAGNESIUM: CPT | Performed by: NURSE PRACTITIONER

## 2025-03-26 ENCOUNTER — OFFICE VISIT (OUTPATIENT)
Dept: INTERNAL MEDICINE | Facility: CLINIC | Age: 35
End: 2025-03-26
Payer: COMMERCIAL

## 2025-03-26 VITALS
HEART RATE: 96 BPM | DIASTOLIC BLOOD PRESSURE: 88 MMHG | SYSTOLIC BLOOD PRESSURE: 128 MMHG | WEIGHT: 204.81 LBS | OXYGEN SATURATION: 100 % | TEMPERATURE: 97 F | HEIGHT: 64 IN | BODY MASS INDEX: 34.97 KG/M2

## 2025-03-26 DIAGNOSIS — K21.9 GASTROESOPHAGEAL REFLUX DISEASE WITHOUT ESOPHAGITIS: ICD-10-CM

## 2025-03-26 DIAGNOSIS — J45.21 MILD INTERMITTENT ASTHMA WITH ACUTE EXACERBATION: ICD-10-CM

## 2025-03-26 DIAGNOSIS — K52.9 GASTROENTERITIS: ICD-10-CM

## 2025-03-26 DIAGNOSIS — I10 PRIMARY HYPERTENSION: Chronic | ICD-10-CM

## 2025-03-26 PROCEDURE — 3044F HG A1C LEVEL LT 7.0%: CPT | Mod: CPTII,S$GLB,, | Performed by: NURSE PRACTITIONER

## 2025-03-26 PROCEDURE — 4010F ACE/ARB THERAPY RXD/TAKEN: CPT | Mod: CPTII,S$GLB,, | Performed by: NURSE PRACTITIONER

## 2025-03-26 PROCEDURE — G2211 COMPLEX E/M VISIT ADD ON: HCPCS | Mod: S$GLB,,, | Performed by: NURSE PRACTITIONER

## 2025-03-26 PROCEDURE — 99999 PR PBB SHADOW E&M-EST. PATIENT-LVL IV: CPT | Mod: PBBFAC,,, | Performed by: NURSE PRACTITIONER

## 2025-03-26 PROCEDURE — 99214 OFFICE O/P EST MOD 30 MIN: CPT | Mod: S$GLB,,, | Performed by: NURSE PRACTITIONER

## 2025-03-26 PROCEDURE — 1159F MED LIST DOCD IN RCRD: CPT | Mod: CPTII,S$GLB,, | Performed by: NURSE PRACTITIONER

## 2025-03-26 PROCEDURE — 1160F RVW MEDS BY RX/DR IN RCRD: CPT | Mod: CPTII,S$GLB,, | Performed by: NURSE PRACTITIONER

## 2025-03-26 PROCEDURE — 3074F SYST BP LT 130 MM HG: CPT | Mod: CPTII,S$GLB,, | Performed by: NURSE PRACTITIONER

## 2025-03-26 PROCEDURE — 3079F DIAST BP 80-89 MM HG: CPT | Mod: CPTII,S$GLB,, | Performed by: NURSE PRACTITIONER

## 2025-03-26 PROCEDURE — 3008F BODY MASS INDEX DOCD: CPT | Mod: CPTII,S$GLB,, | Performed by: NURSE PRACTITIONER

## 2025-03-26 RX ORDER — PANTOPRAZOLE SODIUM 40 MG/1
40 TABLET, DELAYED RELEASE ORAL EVERY MORNING
Qty: 90 TABLET | Refills: 3 | Status: SHIPPED | OUTPATIENT
Start: 2025-03-26

## 2025-03-26 RX ORDER — AMLODIPINE BESYLATE 10 MG/1
10 TABLET ORAL DAILY
Qty: 90 TABLET | Refills: 2 | Status: SHIPPED | OUTPATIENT
Start: 2025-03-26 | End: 2025-03-26

## 2025-03-26 RX ORDER — ALBUTEROL SULFATE 90 UG/1
2 INHALANT RESPIRATORY (INHALATION) EVERY 6 HOURS PRN
Qty: 18 G | Refills: 1 | Status: SHIPPED | OUTPATIENT
Start: 2025-03-26

## 2025-03-26 RX ORDER — MONTELUKAST SODIUM 10 MG/1
10 TABLET ORAL NIGHTLY
Qty: 90 TABLET | Refills: 2 | Status: SHIPPED | OUTPATIENT
Start: 2025-03-26

## 2025-03-26 RX ORDER — AMLODIPINE AND OLMESARTAN MEDOXOMIL 10; 40 MG/1; MG/1
1 TABLET ORAL DAILY
Qty: 30 TABLET | Refills: 7 | Status: SHIPPED | OUTPATIENT
Start: 2025-03-26

## 2025-04-02 NOTE — PROGRESS NOTES
"  Subjective:      Patient ID: Emeka Miramontes is a 34 y.o. female.    Chief Complaint: Follow-up    History of Present Illness    CHIEF COMPLAINT:  Emeka presents today for follow up    BLOOD PRESSURE AND MEDICATIONS:  Home blood pressure reading is 128/88. She continues Omnisortin and Amlodipine, and has discontinued Chlorthalidone as instructed. She also takes Pantoprazole (needs refill) and Singulair. She has a rescue inhaler with one refill remaining.    NUTRITION:  She reports not eating for approximately one week, which she attributes to the time of year. This decreased food intake resulted in low potassium levels.          Problem List[1]    Current Medications[2]      Objective:   /88 (BP Location: Left arm, Patient Position: Sitting)   Pulse 96   Temp 96.6 °F (35.9 °C) (Tympanic)   Ht 5' 4" (1.626 m)   Wt 92.9 kg (204 lb 12.9 oz)   LMP 03/25/2025 (Exact Date)   SpO2 100%   BMI 35.16 kg/m²     Physical Exam             Physical Exam  Vitals and nursing note reviewed.   Constitutional:       General: She is awake. She is not in acute distress.     Appearance: Normal appearance. She is well-developed and well-groomed. She is not ill-appearing, toxic-appearing or diaphoretic.   HENT:      Head: Normocephalic and atraumatic.      Right Ear: External ear normal.      Left Ear: External ear normal.   Eyes:      Pupils: Pupils are equal, round, and reactive to light.   Cardiovascular:      Rate and Rhythm: Normal rate and regular rhythm.      Heart sounds: Normal heart sounds. No murmur heard.  Pulmonary:      Effort: Pulmonary effort is normal. No tachypnea, bradypnea, accessory muscle usage, prolonged expiration, respiratory distress or retractions.      Breath sounds: Normal breath sounds. No stridor, decreased air movement or transmitted upper airway sounds. No decreased breath sounds, wheezing, rhonchi or rales.   Abdominal:      General: There is no distension.      Palpations: Abdomen is " soft. There is no mass.      Tenderness: There is no abdominal tenderness. There is no guarding.   Musculoskeletal:         General: No swelling, tenderness, deformity or signs of injury.      Cervical back: Normal range of motion and neck supple.   Skin:     General: Skin is warm and dry.      Capillary Refill: Capillary refill takes less than 2 seconds.   Neurological:      General: No focal deficit present.      Mental Status: She is alert and oriented to person, place, and time.      Gait: Gait normal.   Psychiatric:         Attention and Perception: Attention and perception normal.         Mood and Affect: Mood and affect normal.         Speech: Speech normal.         Behavior: Behavior normal. Behavior is cooperative.         Cognition and Memory: Cognition normal.          Assessment/Plan :   1. Primary hypertension  -     Discontinue: amLODIPine (NORVASC) 10 MG tablet; Take 1 tablet (10 mg total) by mouth once daily.  Dispense: 90 tablet; Refill: 2  -     amlodipine-olmesartan (KENRICK) 10-40 mg per tablet; Take 1 tablet by mouth once daily.  Dispense: 30 tablet; Refill: 7    2. Gastroesophageal reflux disease without esophagitis  -     pantoprazole (PROTONIX) 40 MG tablet; Take 1 tablet (40 mg total) by mouth every morning.  Dispense: 90 tablet; Refill: 3    3. Mild intermittent asthma with acute exacerbation  -     montelukast (SINGULAIR) 10 mg tablet; Take 1 tablet (10 mg total) by mouth every evening.  Dispense: 90 tablet; Refill: 2  -     albuterol (PROVENTIL/VENTOLIN HFA) 90 mcg/actuation inhaler; Inhale 2 puffs into the lungs every 6 (six) hours as needed for Wheezing.  Dispense: 18 g; Refill: 1    4. Gastroenteritis         Assessment & Plan    Reviewed BP readings from home, noting improvement.  Assessed potassium levels, which were previously low but are now improving.  Evaluated current medication regimen and made adjustments to optimize BP control.  Considered combination medication (Kenrick) for BP  management, but opted to keep medications separate due to cost considerations.    HYPERTENSION:  - Discontinued Chlorthalidone.  - Continued Olmesartan and Amlodipine.  - Prescribed combination of Olmesartan and Amlodipine (Shalonda) for blood pressure management.  - Reviewed the patient's blood pressure readings and medication adherence.  - Noted significant improvement in the patient's blood pressure.  - Considered alternative medication options due to insurance coverage issues.  - Instructed the patient to continue monitoring blood pressure at home and recording readings.    ASTHMA:  - Refilled Montelukast (Singulair) for asthma management.  - Refilled rescue inhaler prescription.    GASTROESOPHAGEAL REFLUX DISEASE (GERD):  - Refilled Pantoprazole (Protonix) prescription with 3 refills for GERD management.    HYPOKALEMIA:  - Discontinued potassium supplements.  - Noted the patient's falling potassium levels.  - Discussed possible causes of low potassium levels with the patient.  - Instructed the patient to discontinue taking potassium pills.    FOLLOW-UP:  - Follow up in 6 months.  - If everything is stable at next visit, consider transitioning to annual follow-ups.          No follow-ups on file.    This note was generated with the assistance of ambient listening technology. Verbal consent was obtained by the patient and accompanying visitor(s) for the recording of patient appointment to facilitate this note. I attest to having reviewed and edited the generated note for accuracy, though some syntax or spelling errors may persist. Please contact the author of this note for any clarification.            [1]   Patient Active Problem List  Diagnosis    Dysmenorrhea    Primary hypertension    Migraine with aura and without status migrainosus, not intractable    Weight loss, unintentional    Prediabetes    Elevated serum protein level    At risk for medication nonadherence    Gastroesophageal reflux disease without  esophagitis    Anemia    Severe obesity (BMI 35.0-39.9) with comorbidity    Mild intermittent asthma with acute exacerbation    Anaphylactic reaction due to food    Situational depression   [2]   Current Outpatient Medications:     ALBUTEROL INHL, Inhale into the lungs., Disp: , Rfl:     albuterol-ipratropium (DUO-NEB) 2.5 mg-0.5 mg/3 mL nebulizer solution, Take 3 mLs by nebulization every 6 (six) hours as needed for Wheezing. Rescue, Disp: 75 mL, Rfl: 0    atogepant (QULIPTA) 60 mg Tab, Take 1 tablet by mouth once daily., Disp: , Rfl:     budesonide-formoterol 160-4.5 mcg (SYMBICORT) 160-4.5 mcg/actuation HFAA, Inhale 2 puffs into the lungs every 12 (twelve) hours. Controller, Disp: 1 g, Rfl: 11    EPINEPHrine (EPIPEN 2-RUDI) 0.3 mg/0.3 mL AtIn, Inject underneath skin according to directions on packaging once as needed for severe allergic reaction, then call 911., Disp: 2 each, Rfl: 2    EScitalopram oxalate (LEXAPRO) 20 MG tablet, Take 1 tablet (20 mg total) by mouth once daily. for 14 days, Disp: 14 tablet, Rfl: 0    fluticasone propionate (FLONASE) 50 mcg/actuation nasal spray, 2 sprays (100 mcg total) by Each Nostril route 2 (two) times daily., Disp: 1 mL, Rfl: 6    gabapentin (NEURONTIN) 300 MG capsule, Take 300 mg by mouth 2 (two) times daily as needed., Disp: , Rfl:     metFORMIN (GLUCOPHAGE-XR) 500 MG ER 24hr tablet, Take 2 tablets (1,000 mg total) by mouth daily with breakfast., Disp: 180 tablet, Rfl: 1    multivitamin-Ca-iron-minerals 18-0.4 mg Tab, Take 1 tablet by mouth once daily., Disp: , Rfl:     ondansetron (ZOFRAN) 4 MG tablet, , Disp: , Rfl:     rimegepant (NURTEC) 75 mg odt, DISSOLVE ONE TABLET BY MOUTH EVERY DAY AT ONSET OF MIGRAINE. NO MORE THAT 3 DAYS/WEEK, Disp: , Rfl:     albuterol (PROVENTIL/VENTOLIN HFA) 90 mcg/actuation inhaler, Inhale 2 puffs into the lungs every 6 (six) hours as needed for Wheezing., Disp: 18 g, Rfl: 1    amlodipine-olmesartan (KENRICK) 10-40 mg per tablet, Take 1 tablet by  mouth once daily., Disp: 30 tablet, Rfl: 7    LORazepam (ATIVAN) 0.5 MG tablet, Take 1 tablet (0.5 mg total) by mouth daily as needed for Anxiety., Disp: 7 tablet, Rfl: 0    montelukast (SINGULAIR) 10 mg tablet, Take 1 tablet (10 mg total) by mouth every evening., Disp: 90 tablet, Rfl: 2    pantoprazole (PROTONIX) 40 MG tablet, Take 1 tablet (40 mg total) by mouth every morning., Disp: 90 tablet, Rfl: 3

## 2025-04-03 ENCOUNTER — CLINICAL SUPPORT (OUTPATIENT)
Dept: PULMONOLOGY | Facility: CLINIC | Age: 35
End: 2025-04-03
Payer: COMMERCIAL

## 2025-04-03 ENCOUNTER — OFFICE VISIT (OUTPATIENT)
Dept: PULMONOLOGY | Facility: CLINIC | Age: 35
End: 2025-04-03
Payer: COMMERCIAL

## 2025-04-03 VITALS
SYSTOLIC BLOOD PRESSURE: 128 MMHG | HEART RATE: 95 BPM | DIASTOLIC BLOOD PRESSURE: 74 MMHG | OXYGEN SATURATION: 100 % | HEIGHT: 64 IN | RESPIRATION RATE: 19 BRPM | WEIGHT: 204.13 LBS | BODY MASS INDEX: 34.85 KG/M2

## 2025-04-03 VITALS — HEIGHT: 64 IN | BODY MASS INDEX: 34.85 KG/M2 | WEIGHT: 204.13 LBS

## 2025-04-03 DIAGNOSIS — J45.21 MILD INTERMITTENT ASTHMA WITH ACUTE EXACERBATION: ICD-10-CM

## 2025-04-03 DIAGNOSIS — J45.50 SEVERE PERSISTENT ASTHMA WITHOUT COMPLICATION: Primary | ICD-10-CM

## 2025-04-03 PROCEDURE — 99999 PR PBB SHADOW E&M-EST. PATIENT-LVL I: CPT | Mod: PBBFAC,,,

## 2025-04-03 PROCEDURE — 99999 PR PBB SHADOW E&M-EST. PATIENT-LVL IV: CPT | Mod: PBBFAC,,, | Performed by: STUDENT IN AN ORGANIZED HEALTH CARE EDUCATION/TRAINING PROGRAM
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
## 2025-04-03 NOTE — PROCEDURES
Clinical Guide to Interpretation or FeNO Levels :    FeNO  (ppb) LOW INTERMEDIATE HIGH   ADULT VALUES < 25 25-50          > 50   Th2-driven Inflammation Unlikely Likely Significant     Patients FeNO level at this visit : __10__ (ppb)    Interpretation of FeNO measurement in adults:  [x] FENO is less than 25 ppb implies non eosinophilic airway inflammation or the absence of airway inflammation.    Comment: Low FENO (<25 ppb) in adult asthmatics with persistent symptoms suggests other etiologies for these symptoms and a lower likelihood of benefit from adding or increasing inhaled glucocorticoids.      Discussion:  A FENO less than 25 ppb in adults and less than 20 ppb in children younger than 12 years of age implies noneosinophilic airway inflammation or the absence of airway inflammation.  A FENO greater than 50 ppb in adults or greater than 35 ppb in children suggests eosinophilic airway inflammation.   Values of FENO between 25 and 50 ppb in adults (20 to 35 ppb in children) should be interpreted cautiously with reference to the clinical situation (eg, symptomatic, on or off therapy, current smoking).  A rising FENO with a greater than 20 percent change and more than 25 ppb (20 ppb in children) from a previously stable level suggests increasing eosinophilic airway inflammation, but there are wide inter-individual differences.  A decrease in FENO greater than 20 percent for values over 50 ppb or more than 10 ppb for values less than 50 ppb may be clinically important.  ?FENO in other respiratory diseases - Several other diseases are associated with altered levels of exhaled NO: low levels of FENO have been noted in cystic fibrosis, current smoking, pulmonary hypertension, hypothermia, primary ciliary dyskinesia, and bronchopulmonary dysplasia. Elevated FENO has been noted in atopy, nonasthmatic eosinophilic bronchitis, COPD exacerbations, noncystic fibrosis bronchiectasis, and viral upper respiratory  infections.    REFERENCE:  ATS Board of Directors, December 2004, and by the ERS Executive Committee, June 2004. ATS/ERS Recommendations for Standardized Procedures for the Online and Offline Measurement of Exhaled Lower Respiratory Nitric Oxide and Nasal Nitric Oxide. Guideline 2005

## 2025-04-03 NOTE — PROCEDURES
"[facility] [place]-[facility] 3rdFl  Six Minute Walk     SUMMARY     Ordering Provider: Dr Cash   Interpreting Provider: Dr Cash  Performing nurse/tech/RT: DIANE RRT  Diagnosis: Asthma  Height: 5' 4" (162.6 cm)  Weight: 92.6 kg (204 lb 2.3 oz)  BMI (Calculated): 35                Phase Oxygen Assessment Supplemental O2 Heart   Rate Blood Pressure Shae Dyspnea Scale Rating   Resting 100 % Room Air 95 bpm 128/74 0   Exercise        Minute        1 100 % Room Air 118 bpm     2 100 % Room Air 126 bpm     3 100 % Room Air 141 bpm     4 99 % Room Air 143 bpm     5 99 % Room Air 143 bpm     6  98 % Room Air 144 bpm 144/78 4   Recovery        Minute        1 98 % Room Air 126 bpm     2 98 % Room Air 113 bpm     3 98 % Room Air 106 bpm     4 98 % Room Air 100 bpm 130/70 0     Six Minute Walk Summary  6MWT Status: completed without stopping  Patient Reported: Dyspnea     Interpretation:  Did the patient stop or pause?: No                                         Total Time Walked (Calculated): 360 seconds  Final Partial Lap Distance (feet): 50 feet  Total Distance Meters (Calculated): 441.96 meters  Predicted Distance Meters (Calculated): 601.51 meters  Percentage of Predicted (Calculated): 73.48  Peak VO2 (Calculated): 17.24  Mets: 4.93  Has The Patient Had a Previous Six Minute Walk Test?: No       Previous 6MWT Results  Has The Patient Had a Previous Six Minute Walk Test?: No         CLINICAL INTERPRETATION:  Six minute walk distance is 444 m (74 % predicted) with light dyspnea.  During exercise, there was no significant desaturation while breathing room air.  This may represent a normal cardiovascular response to exercise.  The patient did not report non-pulmonary symptoms during exercise.  The patient did complete the study, walking 360 seconds of the 360 second test.  No previous study performed.  Based upon age and body mass index, exercise capacity is less than predicted.    Summary   Mildly decreased exercise " tolerance when compared to people her age height and gender.  Normal cardiovascular response to exercise and no exercise-induced hypoxemia

## 2025-04-30 ENCOUNTER — OFFICE VISIT (OUTPATIENT)
Dept: INTERNAL MEDICINE | Facility: CLINIC | Age: 35
End: 2025-04-30
Payer: COMMERCIAL

## 2025-04-30 VITALS
DIASTOLIC BLOOD PRESSURE: 90 MMHG | RESPIRATION RATE: 18 BRPM | BODY MASS INDEX: 34.92 KG/M2 | OXYGEN SATURATION: 100 % | SYSTOLIC BLOOD PRESSURE: 140 MMHG | TEMPERATURE: 98 F | HEIGHT: 64 IN | WEIGHT: 204.56 LBS

## 2025-04-30 DIAGNOSIS — S60.552A: Primary | ICD-10-CM

## 2025-04-30 DIAGNOSIS — M79.605 ACUTE LEG PAIN, LEFT: ICD-10-CM

## 2025-04-30 DIAGNOSIS — I10 PRIMARY HYPERTENSION: Chronic | ICD-10-CM

## 2025-04-30 PROCEDURE — 99999 PR PBB SHADOW E&M-EST. PATIENT-LVL V: CPT | Mod: PBBFAC,,, | Performed by: NURSE PRACTITIONER

## 2025-04-30 RX ORDER — AMLODIPINE BESYLATE AND OLMESARTAN MEDOXOMIL 10; 40 MG/1; MG/1
1 TABLET ORAL DAILY
Qty: 30 TABLET | Refills: 7 | Status: SHIPPED | OUTPATIENT
Start: 2025-04-30 | End: 2025-04-30 | Stop reason: ALTCHOICE

## 2025-04-30 RX ORDER — AMLODIPINE AND VALSARTAN 10; 160 MG/1; MG/1
1 TABLET ORAL DAILY
Qty: 90 TABLET | Refills: 3 | Status: SHIPPED | OUTPATIENT
Start: 2025-04-30 | End: 2025-05-01

## 2025-05-01 RX ORDER — VALSARTAN 160 MG/1
160 TABLET ORAL DAILY
Qty: 90 TABLET | Refills: 3 | Status: SHIPPED | OUTPATIENT
Start: 2025-05-01 | End: 2025-07-23

## 2025-05-01 RX ORDER — AMLODIPINE BESYLATE 10 MG/1
10 TABLET ORAL DAILY
Qty: 90 TABLET | Refills: 3 | Status: SHIPPED | OUTPATIENT
Start: 2025-05-01 | End: 2026-05-01

## 2025-05-05 ENCOUNTER — TELEPHONE (OUTPATIENT)
Dept: ORTHOPEDICS | Facility: CLINIC | Age: 35
End: 2025-05-05
Payer: COMMERCIAL

## 2025-05-05 ENCOUNTER — PATIENT MESSAGE (OUTPATIENT)
Dept: PULMONOLOGY | Facility: CLINIC | Age: 35
End: 2025-05-05
Payer: COMMERCIAL

## 2025-05-05 NOTE — TELEPHONE ENCOUNTER
----- Message from Med Assistant Soto sent at 5/5/2025  2:13 PM CDT -----  Contact: pt    ----- Message -----  From: Nichol Meyer  Sent: 5/5/2025   2:10 PM CDT  To: [facility] Clinical Staff    Type:  Patient Returning CallWho Called: ptWho Left Message for Patient: Genesis the patient know what this is regarding?: julita from referralWould the patient rather a call back or a response via [facility]? phoneBe Call Back Number: [phone] Information:

## 2025-05-07 ENCOUNTER — HOSPITAL ENCOUNTER (OUTPATIENT)
Dept: RADIOLOGY | Facility: HOSPITAL | Age: 35
Discharge: HOME | End: 2025-05-07
Attending: ORTHOPAEDIC SURGERY
Payer: COMMERCIAL

## 2025-05-07 ENCOUNTER — OFFICE VISIT (OUTPATIENT)
Dept: ORTHOPEDICS | Facility: CLINIC | Age: 35
End: 2025-05-07
Payer: COMMERCIAL

## 2025-05-07 DIAGNOSIS — S60.352A: ICD-10-CM

## 2025-05-07 DIAGNOSIS — S60.352A FOREIGN BODY OF LEFT THUMB, INITIAL ENCOUNTER: Primary | ICD-10-CM

## 2025-05-07 DIAGNOSIS — S60.352A: Primary | ICD-10-CM

## 2025-05-07 DIAGNOSIS — S60.552A: ICD-10-CM

## 2025-05-07 PROCEDURE — 4010F ACE/ARB THERAPY RXD/TAKEN: CPT | Mod: CPTII,S$GLB,, | Performed by: ORTHOPAEDIC SURGERY

## 2025-05-07 PROCEDURE — 1159F MED LIST DOCD IN RCRD: CPT | Mod: CPTII,S$GLB,, | Performed by: ORTHOPAEDIC SURGERY

## 2025-05-07 PROCEDURE — 99999 PR PBB SHADOW E&M-EST. PATIENT-LVL III: CPT | Mod: PBBFAC,,, | Performed by: ORTHOPAEDIC SURGERY

## 2025-05-07 PROCEDURE — 99204 OFFICE O/P NEW MOD 45 MIN: CPT | Mod: S$GLB,,, | Performed by: ORTHOPAEDIC SURGERY

## 2025-05-07 PROCEDURE — 73140 X-RAY EXAM OF FINGER(S): CPT | Mod: TC,LT

## 2025-05-07 PROCEDURE — 3044F HG A1C LEVEL LT 7.0%: CPT | Mod: CPTII,S$GLB,, | Performed by: ORTHOPAEDIC SURGERY

## 2025-05-07 PROCEDURE — 73140 X-RAY EXAM OF FINGER(S): CPT | Mod: 26,LT,, | Performed by: RADIOLOGY

## 2025-05-07 RX ORDER — DUPILUMAB 300 MG/2ML
300 INJECTION, SOLUTION SUBCUTANEOUS
COMMUNITY

## 2025-05-07 NOTE — H&P (VIEW-ONLY)
PEREZ Blanchard M.D.  Orthopaedic Hand and Wrist Surgery  [facility]    Patient ID: Emeka Miramontes  YOB: 1990  MRN: [id number]    Provider Note/Medical Decision Makin. Foreign body of left thumb, initial encounter  Assessment & Plan:  The patient and I talked at length about the natural history and pathophysiology of her injury which is left thumb retained foreign body , she understands that this may lead to chronic problems which may have acute episodic exacerbations.   Symptoms may resolve, worsen and even become permanent.  The patient understands the treatment options including observation, activity modification, therapy, NSAIDs, splints and the surgical options including excision.  The risks of the diagnosis and the treatment options include pain, infection, bleeding, damage to nerves and vessels, stiffness, scarring, incomplete relief or recurrence of symptoms, poor pain and functional outcomes.  Unique risks of this diagnosis and the treatment include inability to remove the entirety of the foreign body and infection.  The patients treatment is further complicated by an increased risk of poor outcome secondary to delay in treatment.  The patient has elected to proceed with left thumb wound exploration possible foreign body excision and we will follow up postop.        2. Foreign body of skin of left hand  -     Ambulatory referral/consult to Orthopedics         Chief Complaint: Pain and Injury of the Left Hand      Referred By: Jennifer Du    History of Present Illness: Emeka Miramontes is a 34 y.o. female presents with foreign body in left hand for 3 weeks. She reports she was removing a glass screen protector from her phone and large piece of glass got stuck in her hand. She was able to remove a portion of the glass but saw it break and some got left behind.  She reports it has been bothering her with ADLs. She denies any numbness.     Patient was  "queried and this is the extent of the patients current complaints today.    Past Medical History:     Estimated body mass index is 35.12 kg/m² as calculated from the following:    Height as of 4/30/25: 5' 4" (1.626 m).    Weight as of 4/30/25: 92.8 kg (204 lb 9.4 oz).  Past Medical History:   Diagnosis Date    Anxiety     Asthma     Bilious vomiting with nausea 5/30/2023    Depression     Epigastric pain 5/30/2023    Present since 04/2023    Hypertension     unsure if she has HTN; started this due to migraine medication that can cause her BP to go up    Migraine with aura and without status migrainosus, not intractable 5/4/2023    Migraines      Past Surgical History:   Procedure Laterality Date    ESOPHAGOGASTRODUODENOSCOPY N/A 5/30/2023    Procedure: EGD (ESOPHAGOGASTRODUODENOSCOPY);  Surgeon: Rosario Stock MD;  Location: [facility] ENDO;  Service: Endoscopy;  Laterality: N/A;    KNEE SURGERY Right     ROBOT-ASSISTED CHOLECYSTECTOMY USING DA PATRICK XI N/A 7/26/2023    Procedure: XI ROBOTIC CHOLECYSTECTOMY;  Surgeon: Moise Ojeda MD;  Location: [facility] OR;  Service: General;  Laterality: N/A;    ROTATOR CUFF REPAIR       Family History   Problem Relation Name Age of Onset    Hypertension Mother      Breast cancer Mother  68    Ovarian cancer Neg Hx       Social History[1]  Medication List with Changes/Refills   Current Medications    ALBUTEROL (PROVENTIL/VENTOLIN HFA) 90 MCG/ACTUATION INHALER    Inhale 2 puffs into the lungs every 6 (six) hours as needed for Wheezing.    ALBUTEROL INHL    Inhale into the lungs.    ALBUTEROL-IPRATROPIUM (DUO-NEB) 2.5 MG-0.5 MG/3 ML NEBULIZER SOLUTION    Take 3 mLs by nebulization every 6 (six) hours as needed for Wheezing. Rescue    AMLODIPINE (NORVASC) 10 MG TABLET    Take 1 tablet (10 mg total) by mouth once daily.    ATOGEPANT (QULIPTA) 60 MG TAB    Take 1 tablet by mouth once daily.    BUDESONIDE-FORMOTEROL 160-4.5 MCG (SYMBICORT) 160-4.5 MCG/ACTUATION HFAA    Inhale 2 puffs " into the lungs every 12 (twelve) hours. Controller    DUPILUMAB (DUPIXENT PEN) 300 MG/2 ML PNIJ    Inject 300 mg into the skin every 14 (fourteen) days.    EPINEPHRINE (EPIPEN 2-RUDI) 0.3 MG/0.3 ML ATIN    Inject underneath skin according to directions on packaging once as needed for severe allergic reaction, then call 911.    ESCITALOPRAM OXALATE (LEXAPRO) 20 MG TABLET    Take 1 tablet (20 mg total) by mouth once daily. for 14 days    FLUTICASONE PROPIONATE (FLONASE) 50 MCG/ACTUATION NASAL SPRAY    2 sprays (100 mcg total) by Each Nostril route 2 (two) times daily.    GABAPENTIN (NEURONTIN) 300 MG CAPSULE    Take 300 mg by mouth 2 (two) times daily as needed.    LORAZEPAM (ATIVAN) 0.5 MG TABLET    Take 1 tablet (0.5 mg total) by mouth daily as needed for Anxiety.    METFORMIN (GLUCOPHAGE-XR) 500 MG ER 24HR TABLET    Take 2 tablets (1,000 mg total) by mouth daily with breakfast.    MONTELUKAST (SINGULAIR) 10 MG TABLET    Take 1 tablet (10 mg total) by mouth every evening.    MULTIVITAMIN-CA-IRON-MINERALS 18-0.4 MG TAB    Take 1 tablet by mouth once daily.    ONDANSETRON (ZOFRAN) 4 MG TABLET        PANTOPRAZOLE (PROTONIX) 40 MG TABLET    Take 1 tablet (40 mg total) by mouth every morning.    RIMEGEPANT (NURTEC) 75 MG ODT    DISSOLVE ONE TABLET BY MOUTH EVERY DAY AT ONSET OF MIGRAINE. NO MORE THAT 3 DAYS/WEEK    VALSARTAN (DIOVAN) 160 MG TABLET    Take 1 tablet (160 mg total) by mouth once daily.     Review of patient's allergies indicates:   Allergen Reactions    Banana Anaphylaxis    Mushroom Anaphylaxis    Aspirin Hives, Other (See Comments) and Rash     ROS    Physical Exam:   GENERAL: Well appearing, appropriate for stated age, no acute distress.  CARDIOVASCULAR:  Fingers have good brisk refill and good turgor.   PULMONARY: Respirations are even and non-labored.  NEURO: Awake, alert, and oriented x 3.  PSYCH: Mood & affect are appropriate.  Ortho/SPM Exam  Hand/Wrist Musculoskeletal Exam  No numbness of the pulp  of the thumb  Intact EPL and FPL  Healed wound over the MP flexion crease of the left thumb  That has hypersensitive concerning for retained foreign body  Kapandji score is 9    Imaging:    Relevant imaging results reviewed and interpreted by me, discussed with the patient and / or family today.   X-rays show no obvious foreign body    Provider Note/Medical Decision Makin. Foreign body of left thumb, initial encounter  Assessment & Plan:  The patient and I talked at length about the natural history and pathophysiology of her injury which is left thumb retained foreign body , she understands that this may lead to chronic problems which may have acute episodic exacerbations.   Symptoms may resolve, worsen and even become permanent.  The patient understands the treatment options including observation, activity modification, therapy, NSAIDs, splints and the surgical options including excision.  The risks of the diagnosis and the treatment options include pain, infection, bleeding, damage to nerves and vessels, stiffness, scarring, incomplete relief or recurrence of symptoms, poor pain and functional outcomes.  Unique risks of this diagnosis and the treatment include inability to remove the entirety of the foreign body and infection.  The patients treatment is further complicated by an increased risk of poor outcome secondary to delay in treatment.  The patient has elected to proceed with left thumb wound exploration possible foreign body excision and we will follow up postop.        2. Foreign body of skin of left hand  -     Ambulatory referral/consult to Orthopedics         I discussed worrisome and red flag signs and symptoms with the patient. The patient expressed understanding and agreed to alert me immediately or to go to the emergency room if they experience any of these.   Treatment plan was developed with input from the patient/family, and they expressed understanding and agreement with the plan. All  questions were answered today.    There are no Patient Instructions on file for this visit.    PEREZ Blanchard M.D.  [facility]  Orthopedic Hand and Wrist Surgeon    [facility] Hand Specialist  Dr. Stan Blanchard   Google Review   [employer]   [employer]     Disclaimer: This note was prepared using a voice recognition system and is likely to have sound alike errors within the text.            [1]   Social History  Socioeconomic History    Marital status: Single   Tobacco Use    Smoking status: Never    Smokeless tobacco: Never   Substance and Sexual Activity    Alcohol use: Yes    Drug use: No    Sexual activity: Not Currently     Social Drivers of Health     Financial Resource Strain: Low Risk  (3/7/2025)    Overall Financial Resource Strain (CARDIA)     Difficulty of Paying Living Expenses: Not hard at all   Food Insecurity: No Food Insecurity (3/7/2025)    Hunger Vital Sign     Worried About Running Out of Food in the Last Year: Never true     Ran Out of Food in the Last Year: Never true   Transportation Needs: No Transportation Needs (3/7/2025)    PRAPARE - Transportation     Lack of Transportation (Medical): No     Lack of Transportation (Non-Medical): No   Physical Activity: Unknown (3/7/2025)    Exercise Vital Sign     Days of Exercise per Week: 0 days   Stress: Stress Concern Present (3/7/2025)    [place] citizen [place] of Occupational Health - Occupational Stress Questionnaire     Feeling of Stress : Rather much   Housing Stability: Low Risk  (3/7/2025)    Housing Stability Vital Sign     Unable to Pay for Housing in the Last Year: No     Homeless in the Last Year: No      0

## 2025-05-07 NOTE — PROGRESS NOTES
PEREZ Blanchard M.D.  Orthopaedic Hand and Wrist Surgery  [facility]    Patient ID: Emeka Miramontes  YOB: 1990  MRN: [id number]    Provider Note/Medical Decision Makin. Foreign body of left thumb, initial encounter  Assessment & Plan:  The patient and I talked at length about the natural history and pathophysiology of her injury which is left thumb retained foreign body , she understands that this may lead to chronic problems which may have acute episodic exacerbations.   Symptoms may resolve, worsen and even become permanent.  The patient understands the treatment options including observation, activity modification, therapy, NSAIDs, splints and the surgical options including excision.  The risks of the diagnosis and the treatment options include pain, infection, bleeding, damage to nerves and vessels, stiffness, scarring, incomplete relief or recurrence of symptoms, poor pain and functional outcomes.  Unique risks of this diagnosis and the treatment include inability to remove the entirety of the foreign body and infection.  The patients treatment is further complicated by an increased risk of poor outcome secondary to delay in treatment.  The patient has elected to proceed with left thumb wound exploration possible foreign body excision and we will follow up postop.        2. Foreign body of skin of left hand  -     Ambulatory referral/consult to Orthopedics         Chief Complaint: Pain and Injury of the Left Hand      Referred By: Jennifer Du    History of Present Illness: Emeka Miramontes is a 34 y.o. female presents with foreign body in left hand for 3 weeks. She reports she was removing a glass screen protector from her phone and large piece of glass got stuck in her hand. She was able to remove a portion of the glass but saw it break and some got left behind.  She reports it has been bothering her with ADLs. She denies any numbness.     Patient was  "queried and this is the extent of the patients current complaints today.    Past Medical History:     Estimated body mass index is 35.12 kg/m² as calculated from the following:    Height as of 4/30/25: 5' 4" (1.626 m).    Weight as of 4/30/25: 92.8 kg (204 lb 9.4 oz).  Past Medical History:   Diagnosis Date    Anxiety     Asthma     Bilious vomiting with nausea 5/30/2023    Depression     Epigastric pain 5/30/2023    Present since 04/2023    Hypertension     unsure if she has HTN; started this due to migraine medication that can cause her BP to go up    Migraine with aura and without status migrainosus, not intractable 5/4/2023    Migraines      Past Surgical History:   Procedure Laterality Date    ESOPHAGOGASTRODUODENOSCOPY N/A 5/30/2023    Procedure: EGD (ESOPHAGOGASTRODUODENOSCOPY);  Surgeon: Rosario Stock MD;  Location: [facility] ENDO;  Service: Endoscopy;  Laterality: N/A;    KNEE SURGERY Right     ROBOT-ASSISTED CHOLECYSTECTOMY USING DA PATRICK XI N/A 7/26/2023    Procedure: XI ROBOTIC CHOLECYSTECTOMY;  Surgeon: Moise Ojeda MD;  Location: [facility] OR;  Service: General;  Laterality: N/A;    ROTATOR CUFF REPAIR       Family History   Problem Relation Name Age of Onset    Hypertension Mother      Breast cancer Mother  68    Ovarian cancer Neg Hx       Social History[1]  Medication List with Changes/Refills   Current Medications    ALBUTEROL (PROVENTIL/VENTOLIN HFA) 90 MCG/ACTUATION INHALER    Inhale 2 puffs into the lungs every 6 (six) hours as needed for Wheezing.    ALBUTEROL INHL    Inhale into the lungs.    ALBUTEROL-IPRATROPIUM (DUO-NEB) 2.5 MG-0.5 MG/3 ML NEBULIZER SOLUTION    Take 3 mLs by nebulization every 6 (six) hours as needed for Wheezing. Rescue    AMLODIPINE (NORVASC) 10 MG TABLET    Take 1 tablet (10 mg total) by mouth once daily.    ATOGEPANT (QULIPTA) 60 MG TAB    Take 1 tablet by mouth once daily.    BUDESONIDE-FORMOTEROL 160-4.5 MCG (SYMBICORT) 160-4.5 MCG/ACTUATION HFAA    Inhale 2 puffs " into the lungs every 12 (twelve) hours. Controller    DUPILUMAB (DUPIXENT PEN) 300 MG/2 ML PNIJ    Inject 300 mg into the skin every 14 (fourteen) days.    EPINEPHRINE (EPIPEN 2-RUDI) 0.3 MG/0.3 ML ATIN    Inject underneath skin according to directions on packaging once as needed for severe allergic reaction, then call 911.    ESCITALOPRAM OXALATE (LEXAPRO) 20 MG TABLET    Take 1 tablet (20 mg total) by mouth once daily. for 14 days    FLUTICASONE PROPIONATE (FLONASE) 50 MCG/ACTUATION NASAL SPRAY    2 sprays (100 mcg total) by Each Nostril route 2 (two) times daily.    GABAPENTIN (NEURONTIN) 300 MG CAPSULE    Take 300 mg by mouth 2 (two) times daily as needed.    LORAZEPAM (ATIVAN) 0.5 MG TABLET    Take 1 tablet (0.5 mg total) by mouth daily as needed for Anxiety.    METFORMIN (GLUCOPHAGE-XR) 500 MG ER 24HR TABLET    Take 2 tablets (1,000 mg total) by mouth daily with breakfast.    MONTELUKAST (SINGULAIR) 10 MG TABLET    Take 1 tablet (10 mg total) by mouth every evening.    MULTIVITAMIN-CA-IRON-MINERALS 18-0.4 MG TAB    Take 1 tablet by mouth once daily.    ONDANSETRON (ZOFRAN) 4 MG TABLET        PANTOPRAZOLE (PROTONIX) 40 MG TABLET    Take 1 tablet (40 mg total) by mouth every morning.    RIMEGEPANT (NURTEC) 75 MG ODT    DISSOLVE ONE TABLET BY MOUTH EVERY DAY AT ONSET OF MIGRAINE. NO MORE THAT 3 DAYS/WEEK    VALSARTAN (DIOVAN) 160 MG TABLET    Take 1 tablet (160 mg total) by mouth once daily.     Review of patient's allergies indicates:   Allergen Reactions    Banana Anaphylaxis    Mushroom Anaphylaxis    Aspirin Hives, Other (See Comments) and Rash     ROS    Physical Exam:   GENERAL: Well appearing, appropriate for stated age, no acute distress.  CARDIOVASCULAR:  Fingers have good brisk refill and good turgor.   PULMONARY: Respirations are even and non-labored.  NEURO: Awake, alert, and oriented x 3.  PSYCH: Mood & affect are appropriate.  Ortho/SPM Exam  Hand/Wrist Musculoskeletal Exam  No numbness of the pulp  of the thumb  Intact EPL and FPL  Healed wound over the MP flexion crease of the left thumb  That has hypersensitive concerning for retained foreign body  Kapandji score is 9    Imaging:    Relevant imaging results reviewed and interpreted by me, discussed with the patient and / or family today.   X-rays show no obvious foreign body    Provider Note/Medical Decision Makin. Foreign body of left thumb, initial encounter  Assessment & Plan:  The patient and I talked at length about the natural history and pathophysiology of her injury which is left thumb retained foreign body , she understands that this may lead to chronic problems which may have acute episodic exacerbations.   Symptoms may resolve, worsen and even become permanent.  The patient understands the treatment options including observation, activity modification, therapy, NSAIDs, splints and the surgical options including excision.  The risks of the diagnosis and the treatment options include pain, infection, bleeding, damage to nerves and vessels, stiffness, scarring, incomplete relief or recurrence of symptoms, poor pain and functional outcomes.  Unique risks of this diagnosis and the treatment include inability to remove the entirety of the foreign body and infection.  The patients treatment is further complicated by an increased risk of poor outcome secondary to delay in treatment.  The patient has elected to proceed with left thumb wound exploration possible foreign body excision and we will follow up postop.        2. Foreign body of skin of left hand  -     Ambulatory referral/consult to Orthopedics         I discussed worrisome and red flag signs and symptoms with the patient. The patient expressed understanding and agreed to alert me immediately or to go to the emergency room if they experience any of these.   Treatment plan was developed with input from the patient/family, and they expressed understanding and agreement with the plan. All  questions were answered today.    There are no Patient Instructions on file for this visit.    PEREZ Blanchard M.D.  [facility]  Orthopedic Hand and Wrist Surgeon    [facility] Hand Specialist  Dr. Stan Blanchard   Google Review   [employer]   [employer]     Disclaimer: This note was prepared using a voice recognition system and is likely to have sound alike errors within the text.            [1]   Social History  Socioeconomic History    Marital status: Single   Tobacco Use    Smoking status: Never    Smokeless tobacco: Never   Substance and Sexual Activity    Alcohol use: Yes    Drug use: No    Sexual activity: Not Currently     Social Drivers of Health     Financial Resource Strain: Low Risk  (3/7/2025)    Overall Financial Resource Strain (CARDIA)     Difficulty of Paying Living Expenses: Not hard at all   Food Insecurity: No Food Insecurity (3/7/2025)    Hunger Vital Sign     Worried About Running Out of Food in the Last Year: Never true     Ran Out of Food in the Last Year: Never true   Transportation Needs: No Transportation Needs (3/7/2025)    PRAPARE - Transportation     Lack of Transportation (Medical): No     Lack of Transportation (Non-Medical): No   Physical Activity: Unknown (3/7/2025)    Exercise Vital Sign     Days of Exercise per Week: 0 days   Stress: Stress Concern Present (3/7/2025)    [place] [employer] Occupational [employer] - Occupational Stress Questionnaire     Feeling of Stress : Rather much   Housing Stability: Low Risk  (3/7/2025)    Housing Stability Vital Sign     Unable to Pay for Housing in the Last Year: No     Homeless in the Last Year: No

## 2025-05-07 NOTE — ASSESSMENT & PLAN NOTE
The patient and I talked at length about the natural history and pathophysiology of her injury which is left thumb retained foreign body , she understands that this may lead to chronic problems which may have acute episodic exacerbations.   Symptoms may resolve, worsen and even become permanent.  The patient understands the treatment options including observation, activity modification, therapy, NSAIDs, splints and the surgical options including excision.  The risks of the diagnosis and the treatment options include pain, infection, bleeding, damage to nerves and vessels, stiffness, scarring, incomplete relief or recurrence of symptoms, poor pain and functional outcomes.  Unique risks of this diagnosis and the treatment include inability to remove the entirety of the foreign body and infection.  The patients treatment is further complicated by an increased risk of poor outcome secondary to delay in treatment.  The patient has elected to proceed with left thumb wound exploration possible foreign body excision and we will follow up postop.

## 2025-05-08 DIAGNOSIS — S60.352A: Primary | ICD-10-CM

## 2025-05-09 ENCOUNTER — HOSPITAL ENCOUNTER (OUTPATIENT)
Dept: RADIOLOGY | Facility: HOSPITAL | Age: 35
Discharge: HOME | End: 2025-05-09
Attending: ORTHOPAEDIC SURGERY | Admitting: ORTHOPAEDIC SURGERY
Payer: COMMERCIAL

## 2025-05-09 ENCOUNTER — HOSPITAL ENCOUNTER (OUTPATIENT)
Facility: HOSPITAL | Age: 35
Discharge: HOME | End: 2025-05-09
Attending: ORTHOPAEDIC SURGERY | Admitting: ORTHOPAEDIC SURGERY
Payer: COMMERCIAL

## 2025-05-09 VITALS
BODY MASS INDEX: 34.27 KG/M2 | HEART RATE: 93 BPM | SYSTOLIC BLOOD PRESSURE: 168 MMHG | DIASTOLIC BLOOD PRESSURE: 104 MMHG | RESPIRATION RATE: 16 BRPM | TEMPERATURE: 98 F | WEIGHT: 205.69 LBS | OXYGEN SATURATION: 99 % | HEIGHT: 65 IN

## 2025-05-09 DIAGNOSIS — S60.352A FOREIGN BODY OF LEFT THUMB, INITIAL ENCOUNTER: ICD-10-CM

## 2025-05-09 DIAGNOSIS — Z41.9 ELECTIVE SURGERY: Primary | ICD-10-CM

## 2025-05-09 LAB
B-HCG UR QL: NEGATIVE
CTP QC/QA: YES

## 2025-05-09 PROCEDURE — 63600175 PHARM REV CODE 636 W HCPCS: Performed by: ORTHOPAEDIC SURGERY

## 2025-05-09 PROCEDURE — 36000707: Performed by: ORTHOPAEDIC SURGERY

## 2025-05-09 PROCEDURE — 10120 INC&RMVL FB SUBQ TISS SMPL: CPT | Mod: ,,, | Performed by: ORTHOPAEDIC SURGERY

## 2025-05-09 PROCEDURE — 36000706: Performed by: ORTHOPAEDIC SURGERY

## 2025-05-09 PROCEDURE — 81025 URINE PREGNANCY TEST: CPT | Performed by: ORTHOPAEDIC SURGERY

## 2025-05-09 RX ORDER — BUPIVACAINE HYDROCHLORIDE 2.5 MG/ML
INJECTION, SOLUTION EPIDURAL; INFILTRATION; INTRACAUDAL; PERINEURAL
Status: DISCONTINUED
Start: 2025-05-09 | End: 2025-05-09 | Stop reason: HOSPADM

## 2025-05-09 RX ORDER — LIDOCAINE HYDROCHLORIDE 10 MG/ML
INJECTION, SOLUTION EPIDURAL; INFILTRATION; INTRACAUDAL; PERINEURAL
Status: DISCONTINUED | OUTPATIENT
Start: 2025-05-09 | End: 2025-05-09 | Stop reason: HOSPADM

## 2025-05-09 RX ORDER — HYDROCODONE BITARTRATE AND ACETAMINOPHEN 5; 325 MG/1; MG/1
1 TABLET ORAL EVERY 6 HOURS PRN
Qty: 5 TABLET | Refills: 0 | Status: SHIPPED | OUTPATIENT
Start: 2025-05-09 | End: 2025-05-16

## 2025-05-09 RX ORDER — BUPIVACAINE HYDROCHLORIDE 2.5 MG/ML
INJECTION, SOLUTION EPIDURAL; INFILTRATION; INTRACAUDAL; PERINEURAL
Status: DISCONTINUED | OUTPATIENT
Start: 2025-05-09 | End: 2025-05-09 | Stop reason: HOSPADM

## 2025-05-09 RX ORDER — HYDROCODONE BITARTRATE AND ACETAMINOPHEN 5; 325 MG/1; MG/1
1 TABLET ORAL EVERY 4 HOURS PRN
Status: DISCONTINUED | OUTPATIENT
Start: 2025-05-09 | End: 2025-05-09 | Stop reason: HOSPADM

## 2025-05-09 RX ORDER — LIDOCAINE HYDROCHLORIDE 10 MG/ML
INJECTION, SOLUTION EPIDURAL; INFILTRATION; INTRACAUDAL; PERINEURAL
Status: DISCONTINUED
Start: 2025-05-09 | End: 2025-05-09 | Stop reason: HOSPADM

## 2025-05-09 RX ORDER — CHLORHEXIDINE GLUCONATE ORAL RINSE 1.2 MG/ML
10 SOLUTION DENTAL 2 TIMES DAILY
Status: DISCONTINUED | OUTPATIENT
Start: 2025-05-09 | End: 2025-05-09 | Stop reason: HOSPADM

## 2025-05-09 NOTE — OP NOTE
PEREZ Blanchard M.D.  Orthopaedic Hand and Wrist Surgery  [facility]    OPERATIVE REPORT    Procedure Date: 5/9/2025     Patient Name: Emeka Miramontes     YOB: 1990    Pre-Operative Diagnosis:  Foreign body of skin of left thumb [S60.352A]     Post-Operative Diagnosis:  Post-Op Diagnosis Codes:     * Foreign body of skin of left thumb [S60.352A]     Procedure Performed:  Procedure(s) (LRB):  Left hand foreign body removal (Left)   complicated    Surgeon: PEREZ Blanchard MD    Assistant: None    Anesthesia: Local    Fluids: See Anesthesia Record    Urine Output: See Anesthesia Record    Estimated Blood Loss: * No values recorded between 5/9/2025 12:00 AM and 5/9/2025 10:40 AM *    Implants: * No implants in log *    Specimens:   Specimen (24h ago, onward)      None             Drains: None    Tourniquet Time:   Total Tourniquet Time Documented:  Arm  (Left) - 8 minutes  Total: Arm  (Left) - 8 minutes       Complications: No    Fluoro/C-arm utilized during the case: No    Loupes/Microscope: 3.5x Loupe magnification was utilized for this case    Indications for Procedure and Brief History:  Emeka Miramontes is a 34 y.o. female with a piece of screen protector glass in the volar aspect of the left thumb.  X-rays did not show any glass.    I had a long discussion with the the patient about treatment and diagnostic options. We discussed operative and non-operative options and expected outcomes of their diagnosis and co-morbidities. We discussed the risks and benefits of surgery at length, including but not limited to pain, infection, bleeding, damage to adjacent structures such as nerves and blood vessels, failure of appropriate healing, stiffness, scarring, laxity, need for more surgery, stroke, blood clot, loss of life, loss of limb, need for removal of any implants used, anesthesia risks, breathing problems, and heart problems. We talked about common and uncommon risks. We  discussed risks that were higher specific to the patient and their co-morbid conditions.  We discussed expected treatment outcomes in regards to pain, function and the possibility of permanent impairment.  They expressed understanding of the risks and benefits an opted to proceed with surgical management.  The patient was consented and marked prior to transfer to the operating room.    Intra-Operative Findings:  There was a small sliver of screen protector glass that was present at the MP flexion crease which was excised there was no additional over the last with the exploration    Description of Procedure: I met the patient in the preoperative holding area. I identified, confirmed, and marked the operative extremity. All questions were answered. The patient was then taken back to the operating room and transferred to the operative table. The patient was placed in the supine position. All bony prominences were padded. The operative extremity was then prepped and draped in the standard sterile fashion with alcohol and chlorhexidine solutions. A timeout was performed to ensure we had the proper patient, proper operative site, and were performing the proper procedure. All members of the operative team were in agreement with this.     Metacarpal level nerve block was performed left thumb after adequate anesthesia elevation was used for exsanguination followed by inflation of a forearm tourniquet.  A 15 blade was used to make an incision of the MP flexion crease in the area of the foreign body.  Blunt dissection was carried through subcutaneous tissue the digital neurovascular bundles were identified there was a sliver of spring wound protector glass it was excised we took a picture of it in the chart after excision we explored to make sure there was no other fragments of the wound was then irrigated and closed with 4-0 Prolene.  Soft dressing was then applied    All sponge, instrument, and needle counts were correct at  the conclusion of the case.      Condition: Good    Disposition: PACU - hemodynamically stable.    Attestation: I was present and scrubbed for the entire procedure.    Post-Operative Exam:  The patient was examined post-operatively and the limb was warm and well perfused with appropriate neurovascular status relative to preoperative block status. The dressing was intact.      PEREZ Blanchard MD  Orthopaedic Hand and Wrist Surgery

## 2025-05-09 NOTE — DISCHARGE SUMMARY
[facility]  Discharge Note  Short Stay    Procedure(s) (LRB):  Left hand foreign body removal (Left)      OUTCOME: Patient tolerated treatment/procedure well without complication and is now ready for discharge.    DISPOSITION: Home or Self Care    FINAL DIAGNOSIS:  <principal problem not specified>    FOLLOWUP: In clinic    DISCHARGE INSTRUCTIONS:    Discharge Procedure Orders   Diet general     Keep surgical extremity elevated     Lifting restrictions     No driving, operating heavy equipment or signing legal documents while taking pain medication.     Call MD for:  temperature >100.4     Call MD for:  persistent nausea and vomiting     Call MD for:  severe uncontrolled pain     Call MD for:  difficulty breathing, headache or visual disturbances     Call MD for:  redness, tenderness, or signs of infection (pain, swelling, redness, odor or green/yellow discharge around incision site)     Call MD for:  hives     Call MD for:  persistent dizziness or light-headedness     Call MD for:  extreme fatigue     Remove dressing in 72 hours        TIME SPENT ON DISCHARGE: 5 minutes

## 2025-05-09 NOTE — NURSING TRANSFER
Received patient from OR. Patient in extended recovery room 3. Patient remains stable ex: hypertensive; return to baseline. Dressing remains CDI. Safety precautions in place: bed locked in lowest position, bilateral nonslip socks on, room near unit station. Discharge instructions provided and reviewed with patient.

## 2025-05-09 NOTE — DISCHARGE INSTRUCTIONS
Discharge Instructions     Patient Name: Emeka Miramontes  Procedure: Procedure(s) (LRB):  Left hand foreign body removal (Left)     Surgeon: PEREZ Blanchard M.D.     Date of Surgery: 5/9/2025      Wound Care: Keep incision clean and dry until follow up.  May remove dressing in 72 hours and replace with band-aid.   Do not get wound wet!  Weight Bearing Status: Non-weight bearing to the operative extremity.  Activity: Please keep your operative arm elevated.  Please flex and extend your exposed fingers several times per hour.  This will help reduce swelling at the operative site. If the fingers are getting stiff move them more often.  Medication: Take these medications as directed. You should take pain medications with food.    Current Discharge Medication List          While on opioid (narcotic) pain medication, please refrain from:  Driving  Operating Heavy Machinery/Power Tools  Drinking alcohol  All narcotics can cause some degree of itching, sedation, constipation and nausea. You should take stool softeners to prevent constipation. These can be purchased over the counter.   Prescription refill requests are only accepted during normal business hours (Monday-Friday 8am-4pm) and may take up to 48 hours to fill.     If you have any of the following signs or symptoms please proceed to your nearest Emergency Room:   Chest Pain  Shortness of Breath/ Difficulty Breathing  Excessive Bleeding    Please call the office if you have the following:   Excessive swelling that doesn't improve with elevation  Foul smelling odor from your wounds or dressings  Discharge from your surgical wounds  Persistent pain that does not get better with the prescription pain medication & elevation  Persistent nausea and/or vomiting  Fevers greater than 101.1 after the first 48 hours post op  Dramatic increase in post-operative pain    PEREZ Blanchard M.D.  [facility]  Orthopedic Hand and Wrist  Surgeon    [facility]  Dr. Stan Blanchard   Google Review   [employer]   [employer]

## 2025-05-12 ENCOUNTER — PATIENT MESSAGE (OUTPATIENT)
Dept: ORTHOPEDICS | Facility: CLINIC | Age: 35
End: 2025-05-12
Payer: COMMERCIAL

## 2025-05-16 DIAGNOSIS — J45.50 SEVERE PERSISTENT ASTHMA WITHOUT COMPLICATION: Primary | ICD-10-CM

## 2025-05-16 DIAGNOSIS — J45.21 MILD INTERMITTENT ASTHMA WITH ACUTE EXACERBATION: ICD-10-CM

## 2025-05-17 RX ORDER — DUPILUMAB 300 MG/2ML
300 INJECTION, SOLUTION SUBCUTANEOUS
Qty: 4 ML | Refills: 11 | OUTPATIENT
Start: 2025-05-17

## 2025-05-21 ENCOUNTER — OFFICE VISIT (OUTPATIENT)
Dept: ORTHOPEDICS | Facility: CLINIC | Age: 35
End: 2025-05-21
Payer: COMMERCIAL

## 2025-05-21 DIAGNOSIS — Z98.890 POST-OPERATIVE STATE: Primary | ICD-10-CM

## 2025-05-21 DIAGNOSIS — J45.50 SEVERE PERSISTENT ASTHMA WITHOUT COMPLICATION: Primary | ICD-10-CM

## 2025-05-21 PROCEDURE — 99999 PR PBB SHADOW E&M-EST. PATIENT-LVL III: CPT | Mod: PBBFAC,,, | Performed by: ORTHOPAEDIC SURGERY

## 2025-05-21 RX ORDER — DUPILUMAB 300 MG/2ML
300 INJECTION, SOLUTION SUBCUTANEOUS
Qty: 12 ML | Refills: 3 | Status: SHIPPED | OUTPATIENT
Start: 2025-05-21

## 2025-05-21 NOTE — PROGRESS NOTES
PEREZ Blanchard M.D.  Orthopaedic Hand and Wrist Surgery  [address]    Patient ID: Emeka Miramontes  YOB: 1990  MRN: [id number]    Date of Surgery:  2025    Procedure Performed:   Left hand foreign body removal - Left    History of Present Illness: Emeka Miramontes is a 34 y.o. female presents 12 days s/p left hand foreign body removal. Sutures will be removed in clinic today.  She is doing well she reports her preoperative pain is much improved      Patient was queried and this is the extent of the patients current complaints today.    Physical Exam:   Wound: healing well, sutures intact - removed today  Sensation: no decreased sensation  Motor:  Intact EPL FPL full range of motion of the thumb IP joint  Swelling: minimal  Wound healing well no hypersensitivity        Provider Note/Medical Decision Makin. Post-operative state         Do not submerge or soak wound for 2 weeks.  Showers, washing hands and running water is okay.  No lifting, pushing or pulling.  Start desensitization exercises.  Patient understands the risk of retained foreign body.  Return to clinic in 2 months    I discussed worrisome and red flag signs and symptoms with the patient. The patient expressed understanding and agreed to alert me immediately or to go to the emergency room if they experience any of these.   Treatment plan was developed with input from the patient/family, and they expressed understanding and agreement with the plan. All questions were answered today.    There are no Patient Instructions on file for this visit.    PEREZ Blanchard M.D.  [facility]  Orthopedic Hand and Wrist Surgeon    [place] [facility]  Dr. Stan Blanchard   Google Review   Healthgrades   [employer]     Disclaimer: This note was prepared using a voice recognition system and is likely to have sound alike errors within the text.

## 2025-05-27 DIAGNOSIS — J45.50 SEVERE PERSISTENT ASTHMA WITHOUT COMPLICATION: ICD-10-CM

## 2025-05-27 RX ORDER — DUPILUMAB 300 MG/2ML
300 INJECTION, SOLUTION SUBCUTANEOUS
Qty: 12 ML | Refills: 3 | Status: SHIPPED | OUTPATIENT
Start: 2025-05-27

## 2025-06-16 DIAGNOSIS — J45.21 MILD INTERMITTENT ASTHMA WITH ACUTE EXACERBATION: Primary | ICD-10-CM

## 2025-06-26 RX ORDER — BUDESONIDE AND FORMOTEROL FUMARATE DIHYDRATE 160; 4.5 UG/1; UG/1
2 AEROSOL RESPIRATORY (INHALATION) EVERY 12 HOURS
Qty: 1 G | Refills: 11 | Status: SHIPPED | OUTPATIENT
Start: 2025-06-26 | End: 2026-06-26

## 2025-07-23 ENCOUNTER — OFFICE VISIT (OUTPATIENT)
Dept: INTERNAL MEDICINE | Facility: CLINIC | Age: 35
End: 2025-07-23
Payer: COMMERCIAL

## 2025-07-23 ENCOUNTER — HOSPITAL ENCOUNTER (OUTPATIENT)
Dept: RADIOLOGY | Facility: HOSPITAL | Age: 35
Discharge: HOME | End: 2025-07-23
Attending: PHYSICIAN ASSISTANT
Payer: COMMERCIAL

## 2025-07-23 VITALS
BODY MASS INDEX: 35.16 KG/M2 | SYSTOLIC BLOOD PRESSURE: 140 MMHG | HEIGHT: 65 IN | WEIGHT: 211 LBS | DIASTOLIC BLOOD PRESSURE: 100 MMHG | TEMPERATURE: 98 F | HEART RATE: 80 BPM

## 2025-07-23 DIAGNOSIS — I10 PRIMARY HYPERTENSION: Primary | Chronic | ICD-10-CM

## 2025-07-23 DIAGNOSIS — M54.9 MID BACK PAIN: ICD-10-CM

## 2025-07-23 PROCEDURE — 72070 X-RAY EXAM THORAC SPINE 2VWS: CPT | Mod: TC

## 2025-07-23 PROCEDURE — 3077F SYST BP >= 140 MM HG: CPT | Mod: CPTII,S$GLB,, | Performed by: PHYSICIAN ASSISTANT

## 2025-07-23 PROCEDURE — 3080F DIAST BP >= 90 MM HG: CPT | Mod: CPTII,S$GLB,, | Performed by: PHYSICIAN ASSISTANT

## 2025-07-23 PROCEDURE — 99999 PR PBB SHADOW E&M-EST. PATIENT-LVL IV: CPT | Mod: PBBFAC,,, | Performed by: PHYSICIAN ASSISTANT

## 2025-07-23 PROCEDURE — 3044F HG A1C LEVEL LT 7.0%: CPT | Mod: CPTII,S$GLB,, | Performed by: PHYSICIAN ASSISTANT

## 2025-07-23 PROCEDURE — 3008F BODY MASS INDEX DOCD: CPT | Mod: CPTII,S$GLB,, | Performed by: PHYSICIAN ASSISTANT

## 2025-07-23 PROCEDURE — 1159F MED LIST DOCD IN RCRD: CPT | Mod: CPTII,S$GLB,, | Performed by: PHYSICIAN ASSISTANT

## 2025-07-23 PROCEDURE — 72070 X-RAY EXAM THORAC SPINE 2VWS: CPT | Mod: 26,,, | Performed by: RADIOLOGY

## 2025-07-23 PROCEDURE — 99214 OFFICE O/P EST MOD 30 MIN: CPT | Mod: S$GLB,,, | Performed by: PHYSICIAN ASSISTANT

## 2025-07-23 PROCEDURE — 4010F ACE/ARB THERAPY RXD/TAKEN: CPT | Mod: CPTII,S$GLB,, | Performed by: PHYSICIAN ASSISTANT

## 2025-07-23 RX ORDER — OLMESARTAN MEDOXOMIL 20 MG/1
20 TABLET ORAL DAILY
Qty: 30 TABLET | Refills: 1 | Status: SHIPPED | OUTPATIENT
Start: 2025-07-23 | End: 2025-09-21

## 2025-07-23 RX ORDER — TIZANIDINE 4 MG/1
4 TABLET ORAL EVERY 6 HOURS PRN
Qty: 30 TABLET | Refills: 0 | Status: SHIPPED | OUTPATIENT
Start: 2025-07-23 | End: 2025-08-02

## 2025-07-23 RX ORDER — DICLOFENAC SODIUM 50 MG/1
50 TABLET, DELAYED RELEASE ORAL 2 TIMES DAILY
Qty: 60 TABLET | Refills: 0 | Status: SHIPPED | OUTPATIENT
Start: 2025-07-23

## 2025-08-06 ENCOUNTER — CLINICAL SUPPORT (OUTPATIENT)
Dept: REHABILITATION | Facility: HOSPITAL | Age: 35
End: 2025-08-06
Payer: COMMERCIAL

## 2025-08-06 DIAGNOSIS — M62.81 WEAKNESS OF TRUNK MUSCULATURE: Primary | ICD-10-CM

## 2025-08-06 PROCEDURE — 97530 THERAPEUTIC ACTIVITIES: CPT | Mod: PN

## 2025-08-06 PROCEDURE — 97161 PT EVAL LOW COMPLEX 20 MIN: CPT | Mod: PN

## 2025-08-06 NOTE — PROGRESS NOTES
Outpatient Rehab    Physical Therapy Evaluation    Patient Name: Artur Miramontes  MRN: [id number]  YOB: 1990  Encounter Date: 8/6/2025    Therapy Diagnosis:   Encounter Diagnosis   Name Primary?    Weakness of trunk musculature Yes     Physician: Fatemeh Biswas PA-C    Physician Orders: Eval and Treat  Medical Diagnosis: Mid back pain  Surgical Diagnosis: Not applicable for this Episode   Surgical Date: Not applicable for this Episode  Days Since Last Surgery: Not applicable for this Episode    Visit # / Visits Authorized:  1 / 1  Insurance Authorization Period: 7/25/2025 to 12/31/2025  Date of Evaluation: 8/6/2025  Plan of Care Certification: 8/6/2025 to 9/17/2025     Time In: 1430   Time Out: 1515  Total Time (in minutes): 45   Total Billable Time (in minutes):  45 minutes 1:1    Intake Outcome Measure for FOTO Survey    Therapist reviewed FOTO scores for Artur Miramontes on 8/6/2025.   FOTO report - see Media section or FOTO account episode details.     Intake Score (%): 49      Subjective   History of Present Illness  Artur is a 35 y.o. female who reports to physical therapy with a chief concern of Mid and low back pain.     Patient presents with primary complaints of bilateral mid and low back pain. She reports back pain started about a month ago after cleaning her house which she was in a flexed position often. She states the pain comes and goes since then and changes between low back, mid back, and both. She notes a tingling sensation on R LE if she is standing all day, but states this is rare. The tingling begins in the mid thigh down to toes and remains on the inside of her R leg. She thinks this may be related to her previous R knee injuries, most recently a second meniscal repair in 2023. She denies numbness anywhere or tingling into the UEs. Her mid and low back pain are similar in intensity even when both are present together.      Pain     Patient reports a current pain level of 5/10. Pain  at best is reported as 0/10. Pain at worst is reported as 10/10.   Clinical Progression (since onset): Stable  Pain Qualities: Aching  Pain-Relieving Factors: Immobilization, Rest, Relaxation, Change in position  Pain-Aggravating Factors: Sitting, Standing, Bending         Employment  Does the patient's condition impact their ability to work?: No  Employment Status: Employed full-time        Past Medical History/Physical Systems Review:   Emeka Miramontes  has a past medical history of Anxiety, Asthma, Bilious vomiting with nausea, Depression, Epigastric pain, Hypertension, Migraine with aura and without status migrainosus, not intractable, and Migraines.    Emeka Miramontes  has a past surgical history that includes Rotator cuff repair; Knee surgery (Right); Esophagogastroduodenoscopy (N/A, 5/30/2023); Robot-assisted cholecystectomy using da Anika Xi (N/A, 7/26/2023); and Removal of foreign body from hand (Left, 5/9/2025).    Emeka has a current medication list which includes the following prescription(s): albuterol, albuterol, albuterol-ipratropium, amlodipine, qulipta, budesonide-formoterol 160-4.5 mcg, diclofenac, dupixent pen, epinephrine, escitalopram oxalate, fluticasone propionate, gabapentin, lorazepam, metformin, montelukast, multivitamin-ca-iron-minerals, olmesartan, ondansetron, pantoprazole, and nurtec.    Review of patient's allergies indicates:   Allergen Reactions    Banana Anaphylaxis    Mushroom Anaphylaxis    Aspirin Hives, Other (See Comments) and Rash        Objective      RANGE OF MOTION:    Lumbar  (Degrees and percent) Right  (spine) Left   Pain/Dysfunction with Movement Goal   Lumbar Flexion  75 degrees --- Pain in low back with sitting flexion, not in standing No pain   Lumbar Extension  15 degrees  --- Pain limiting further extension in standing but not in sitting 20 degrees no pain    Lumbar Side Bending  To kneecap To kneecap Pain on L mid-low back with both  directions in standing, no pain in sitting No pain    Lumbar Rotation 75%     75% Sitting: pain with rotation to both sides to ipsilateral side  Standing: pain with L rotation to ipsilateral side  No pain        STRENGTH:    L/E MMT Right Left Pain/Dysfunction with Movement Goal   Hip Flexion  4+/5 5/5 P! Both sides starting on ipsilateral and moving to contralateral ---   Hip Extension  4/5 4/5  5/5 B   Hip Abduction  4+/5 4+/5  ---   Knee Extension 4-/5 4-/5  ---   Knee Flexion 5/5 5/5  ---       JOINT MOBILITY:     Joint Motion Tested Right  (spine)   Thoracolumbar junction  Hypomobile and painful   Lumbosacral junction  Hypomobile and painful        SPECIAL TESTS:     Right  (spine) Left    Straight leg raise Positive with ER Negative       Sensation:  Sensation is intact to light touch in B lower extremities     Palpation: Increased tone and tenderness noted with palpation of bilateral paraspinals and periscapular muscles.     Posture:  Pt presents with postural abnormalities which include: forward head and rounded shoulders     Gait Analysis: The patient ambulated with the following assistive device: none.      Functional Tests  Outcome GOAL   High Plank 20s with noted lumbar lordosis and thoracic retraction  60s      Treatment: (15 minutes 1:1)  Therapeutic Activity  TA 1: HEP and POC education         Assessment & Plan   Assessment  Artur presents with a condition of Low complexity.   Presentation of Symptoms: Stable  Will Comorbidities Impact Care: Yes  See patient co-morbidities listed in patient past medical history in subjective section of evaluation.     Functional Limitations: Activity tolerance, Completing work/school activities, Decreased ambulation distance/endurance, Functional mobility, Range of motion, Participating in leisure activities, Painful locomotion/ambulation, Sitting tolerance, Standing tolerance  Impairments: Abnormal coordination, Abnormal muscle tone, Activity intolerance, Abnormal or  restricted range of motion, Abnormal muscle firing, Impaired physical strength, Lack of appropriate home exercise program, Pain with functional activity  Personal Factors Affecting Prognosis: Pain, Schedule    Prognosis: Good  Assessment Details: Patient is a 35 year old female presenting to outpatient physical therapy with diagnosed mid back pain. Patient presents with deficits in strength, TTP, core endurance and posture. Patient is being limited with functional mobility and prolonged posture tolerance in relation to deficits and pain noted.      Plan  From a physical therapy perspective, the patient would benefit from: Skilled Rehab Services    Planned therapy interventions include: Therapeutic activities, Therapeutic exercise, Neuromuscular re-education, Manual therapy, ADLs/IADLs, Aquatic therapy, Canalith repositioning, Cognitive functional training, Community/work reintegration, Gait training, Group therapy, Lymphatic compression wrapping, Orthotic management and training, Other (Comment), Wound care, Work hardening, Work conditioning, Wheelchair management, and Prosthetic management and training.    Planned modalities to include: Thermotherapy (hot pack), Mechanical traction, Electrical stimulation - passive/unattended, Electrical stimulation - attended, and Cryotherapy (cold pack).        Visit Frequency: 1-2 times Per Week for 6 Weeks.       This plan was discussed with Patient.   Discussion participants: Agreed Upon Plan of Care             The patient's spiritual, cultural, and educational needs were considered, and the patient is agreeable to the plan of care and goals.           Goals:   Active       Functional outcome       Patient will show a significant change in FOTO score to 70 or higher to demonstrate subjective improvement       Start:  08/06/25    Expected End:  09/17/25            Patient will demonstrate independence in home program for support of progression       Start:  08/06/25     Expected End:  08/27/25               Maintaining body position       Patient will maintain plank position with proper form and no pain for 60s to demonstrate adequate core stability for functional mobility.        Start:  08/06/25    Expected End:  09/17/25               Pain       Patient will report pain of 2/10  or lower demonstrating a reduction of overall pain       Start:  08/06/25    Expected End:  09/17/25                Rylee Sarah, SPT

## 2025-08-08 ENCOUNTER — OFFICE VISIT (OUTPATIENT)
Dept: PULMONOLOGY | Facility: CLINIC | Age: 35
End: 2025-08-08
Payer: COMMERCIAL

## 2025-08-08 ENCOUNTER — CLINICAL SUPPORT (OUTPATIENT)
Dept: PULMONOLOGY | Facility: CLINIC | Age: 35
End: 2025-08-08
Payer: COMMERCIAL

## 2025-08-08 ENCOUNTER — OFFICE VISIT (OUTPATIENT)
Dept: INTERNAL MEDICINE | Facility: CLINIC | Age: 35
End: 2025-08-08
Payer: COMMERCIAL

## 2025-08-08 VITALS
WEIGHT: 202 LBS | HEIGHT: 65 IN | DIASTOLIC BLOOD PRESSURE: 90 MMHG | BODY MASS INDEX: 33.66 KG/M2 | SYSTOLIC BLOOD PRESSURE: 138 MMHG | HEART RATE: 77 BPM | TEMPERATURE: 99 F

## 2025-08-08 VITALS
SYSTOLIC BLOOD PRESSURE: 146 MMHG | BODY MASS INDEX: 34.28 KG/M2 | RESPIRATION RATE: 14 BRPM | WEIGHT: 202.81 LBS | DIASTOLIC BLOOD PRESSURE: 98 MMHG

## 2025-08-08 VITALS — RESPIRATION RATE: 14 BRPM | HEART RATE: 77 BPM

## 2025-08-08 DIAGNOSIS — M54.9 MID BACK PAIN: ICD-10-CM

## 2025-08-08 DIAGNOSIS — J45.40 MODERATE PERSISTENT ASTHMA WITHOUT COMPLICATION: Primary | ICD-10-CM

## 2025-08-08 DIAGNOSIS — I10 PRIMARY HYPERTENSION: Primary | Chronic | ICD-10-CM

## 2025-08-08 PROCEDURE — 99999 PR PBB SHADOW E&M-EST. PATIENT-LVL III: CPT | Mod: PBBFAC,,, | Performed by: STUDENT IN AN ORGANIZED HEALTH CARE EDUCATION/TRAINING PROGRAM

## 2025-08-08 PROCEDURE — 99999 PR PBB SHADOW E&M-EST. PATIENT-LVL V: CPT | Mod: PBBFAC,,, | Performed by: PHYSICIAN ASSISTANT

## 2025-08-08 PROCEDURE — 99999 PR PBB SHADOW E&M-EST. PATIENT-LVL III: CPT | Mod: PBBFAC,,,

## 2025-08-08 RX ORDER — OLMESARTAN MEDOXOMIL 40 MG/1
40 TABLET ORAL DAILY
Qty: 30 TABLET | Refills: 0 | Status: SHIPPED | OUTPATIENT
Start: 2025-08-08 | End: 2025-09-07

## 2025-08-08 RX ORDER — TIZANIDINE 4 MG/1
4 TABLET ORAL EVERY 6 HOURS PRN
Qty: 30 TABLET | Refills: 1 | Status: SHIPPED | OUTPATIENT
Start: 2025-08-08 | End: 2025-08-18

## 2025-08-08 RX ORDER — DUPILUMAB 300 MG/2ML
INJECTION, SOLUTION SUBCUTANEOUS
COMMUNITY
Start: 2025-08-08

## 2025-08-08 RX ORDER — DICLOFENAC SODIUM 50 MG/1
50 TABLET, DELAYED RELEASE ORAL 2 TIMES DAILY
Qty: 60 TABLET | Refills: 1 | Status: SHIPPED | OUTPATIENT
Start: 2025-08-08
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
## 2025-08-08 NOTE — PROGRESS NOTES
Pulmonary Disease Management  [facility]  Follow up Visit  Chronic Care Management    Emeka Miramontes  was seen 8/8/2025  10:30 AM in the [facility] for evaluation, education, reinforcement of self management techniques and exacerbation action plan.    Nima Suh    Past Medical History:   Diagnosis Date    Anxiety     Asthma     Bilious vomiting with nausea 5/30/2023    Depression     Epigastric pain 5/30/2023    Present since 04/2023    Hypertension     unsure if she has HTN; started this due to migraine medication that can cause her BP to go up    Migraine with aura and without status migrainosus, not intractable 5/4/2023    Migraines        Patient's Medications   New Prescriptions    OLMESARTAN (BENICAR) 40 MG TABLET    Take 1 tablet (40 mg total) by mouth once daily.   Previous Medications    ALBUTEROL (PROVENTIL/VENTOLIN HFA) 90 MCG/ACTUATION INHALER    Inhale 2 puffs into the lungs every 6 (six) hours as needed for Wheezing.    ALBUTEROL INHL    Inhale into the lungs.    ALBUTEROL-IPRATROPIUM (DUO-NEB) 2.5 MG-0.5 MG/3 ML NEBULIZER SOLUTION    Take 3 mLs by nebulization every 6 (six) hours as needed for Wheezing. Rescue    AMLODIPINE (NORVASC) 10 MG TABLET    Take 1 tablet (10 mg total) by mouth once daily.    ATOGEPANT (QULIPTA) 60 MG TAB    Take 1 tablet by mouth once daily.    BUDESONIDE-FORMOTEROL 160-4.5 MCG (SYMBICORT) 160-4.5 MCG/ACTUATION HFAA    Inhale 2 puffs into the lungs every 12 (twelve) hours. Controller    DUPILUMAB (DUPIXENT PEN) 300 MG/2 ML PNIJ    Inject 2 mLs (300 mg total) into the skin every 14 (fourteen) days.    DUPIXENT SYRINGE 300 MG/2 ML SYRG        EPINEPHRINE (EPIPEN 2-RUDI) 0.3 MG/0.3 ML ATIN    Inject underneath skin according to directions on packaging once as needed for severe allergic reaction, then call 911.    ESCITALOPRAM OXALATE (LEXAPRO) 20 MG TABLET    Take 1 tablet (20 mg total) by mouth once daily. for 14 days    FLUTICASONE  PROPIONATE (FLONASE) 50 MCG/ACTUATION NASAL SPRAY    2 sprays (100 mcg total) by Each Nostril route 2 (two) times daily.    GABAPENTIN (NEURONTIN) 300 MG CAPSULE    Take 300 mg by mouth 2 (two) times daily as needed.    LORAZEPAM (ATIVAN) 0.5 MG TABLET    Take 1 tablet (0.5 mg total) by mouth daily as needed for Anxiety.    METFORMIN (GLUCOPHAGE-XR) 500 MG ER 24HR TABLET    Take 2 tablets (1,000 mg total) by mouth daily with breakfast.    MONTELUKAST (SINGULAIR) 10 MG TABLET    Take 1 tablet (10 mg total) by mouth every evening.    MULTIVITAMIN-CA-IRON-MINERALS 18-0.4 MG TAB    Take 1 tablet by mouth once daily.    ONDANSETRON (ZOFRAN) 4 MG TABLET        PANTOPRAZOLE (PROTONIX) 40 MG TABLET    Take 1 tablet (40 mg total) by mouth every morning.    RIMEGEPANT (NURTEC) 75 MG ODT    DISSOLVE ONE TABLET BY MOUTH EVERY DAY AT ONSET OF MIGRAINE. NO MORE THAT 3 DAYS/WEEK   Modified Medications    Modified Medication Previous Medication    DICLOFENAC (VOLTAREN) 50 MG EC TABLET diclofenac (VOLTAREN) 50 MG EC tablet       Take 1 tablet (50 mg total) by mouth 2 (two) times daily.    Take 1 tablet (50 mg total) by mouth 2 (two) times daily.    TIZANIDINE (ZANAFLEX) 4 MG TABLET tiZANidine (ZANAFLEX) 4 MG tablet       Take 1 tablet (4 mg total) by mouth every 6 (six) hours as needed (muscle spasm).    Take 1 tablet (4 mg total) by mouth every 6 (six) hours as needed (muscle spasm).   Discontinued Medications    No medications on file             Educational assessment:   [x]            Good  []            Fair  []            Poor    Readiness to learn:   [x]            Good  []            Fair  []            Poor    Vision Status:   [x]            Good  []            Fair  []            Poor    Reading Ability:  [x]            Good  []            Fair  []            Poor    Knowledge of condition:   [x]            Good  []            Fair  []            Poor    Language Barriers:   []            Good  []            Fair  []             Poor  [x]            None    Cognitive/ Physical Barriers:   []            Good  []            Fair  []            Poor  [x]            None    Learning best by:                       [x]            Seeing  []            Hearing  []            Reading                         [x]            Doing    Describe any barrier /Limitation or financial implications of care choices identified     []            Financial  []            Emotional  []            Education  []            Vision/Hearing  []            Physical  [x]            None  []                TOPIC /CONTENT FOR TODAY:    [x]            MDI with or without spacer  [x]            Dry powder inhaler  []            Acapella   []           Peak Flow meter  [x]            Asthma Action plan  []            Nebulizer use  []            Oxygen use safety  [x]            Breathing and cough techniques  [x]            Energy conservation  [x]            Infection prevention  []           OTHER________________________        Learner:    [x]            Patient   []            Caregiver    Method:    [x]            Verbal explanation  [x]            Audio visual    [x]            Literature  [x]            Teach back      Evaluation:    [x]            Teach back  [x]            Demonstrate  [x]            Follow up phone call    []            2 weeks     []            4 weeks   [x]            PRN    Additional comments:   Patient was seen today to review respiratory medication purpose and proper technique for use of inhalers. Patient practiced proper technique using MDI with valved holding chamber (spacer) and DPI inhalers. Patient demonstrated understanding. Literature was given to patient.     Asthma trigger checklist was verbally reviewed and literature given to patient.     Infection prevention was discussed. Patient was reminded to get influenza vaccine. Hand hygiene and cleaning of respiratory equipment was also discussed. Patient verbalized understanding.       Asthma action plan was reviewed and literature was given to patient. Patient verbalized understanding.     Plan:  Monthly Pulmonary Disease Management Questionnaire  Follow-up PDM appointment scheduled for 4/8/26   Reinforce education  Meds: Symbicort, albuterol   DME Needs: n/a   Action Plan  Immunization: Pneumococcal- current, Flu-current   Next Provider Visit: 4/8/26  Next Spirometry/CPFT: not scheduled   Approximate time spent with patient: 30 mins

## 2025-08-08 NOTE — PROGRESS NOTES
Subjective:      Patient ID: Emeka Miramontes is a 35 y.o. female.    Chief Complaint: Follow-up and Hypertension      HPI  Pt is here today for followup-  HTN-   Taking Amlodipine 10 mg daily, added 20 mg olmesartan at last visit.  Patient reports she has not been checking her blood pressure.  Blood pressure reading in clinic today is improved, but remains elevated    Back pain-  Patient doing physical therapy, taking meds as prescribed.  She notes some improvement.  Would like to continue with PT at this time    Review of Systems   Constitutional:  Negative for activity change, appetite change, chills, diaphoresis, fatigue, fever and unexpected weight change.   Respiratory:  Negative for cough, chest tightness and shortness of breath.    Cardiovascular:  Negative for chest pain, palpitations, leg swelling and claudication.   Gastrointestinal:  Negative for nausea and vomiting.   Musculoskeletal:  Positive for back pain. Negative for arthralgias, gait problem, joint swelling, leg pain, myalgias, neck pain, neck stiffness and joint deformity.   Neurological:  Negative for dizziness, vertigo, syncope, weakness, light-headedness, numbness and headaches.       Medication List with Changes/Refills   New Medications    OLMESARTAN (BENICAR) 40 MG TABLET    Take 1 tablet (40 mg total) by mouth once daily.   Current Medications    ALBUTEROL (PROVENTIL/VENTOLIN HFA) 90 MCG/ACTUATION INHALER    Inhale 2 puffs into the lungs every 6 (six) hours as needed for Wheezing.    ALBUTEROL INHL    Inhale into the lungs.    ALBUTEROL-IPRATROPIUM (DUO-NEB) 2.5 MG-0.5 MG/3 ML NEBULIZER SOLUTION    Take 3 mLs by nebulization every 6 (six) hours as needed for Wheezing. Rescue    AMLODIPINE (NORVASC) 10 MG TABLET    Take 1 tablet (10 mg total) by mouth once daily.    ATOGEPANT (QULIPTA) 60 MG TAB    Take 1 tablet by mouth once daily.    BUDESONIDE-FORMOTEROL 160-4.5 MCG (SYMBICORT) 160-4.5 MCG/ACTUATION HFAA    Inhale 2 puffs into the  lungs every 12 (twelve) hours. Controller    DUPILUMAB (DUPIXENT PEN) 300 MG/2 ML PNIJ    Inject 2 mLs (300 mg total) into the skin every 14 (fourteen) days.    DUPIXENT SYRINGE 300 MG/2 ML SYRG        EPINEPHRINE (EPIPEN 2-RUDI) 0.3 MG/0.3 ML ATIN    Inject underneath skin according to directions on packaging once as needed for severe allergic reaction, then call 911.    ESCITALOPRAM OXALATE (LEXAPRO) 20 MG TABLET    Take 1 tablet (20 mg total) by mouth once daily. for 14 days    FLUTICASONE PROPIONATE (FLONASE) 50 MCG/ACTUATION NASAL SPRAY    2 sprays (100 mcg total) by Each Nostril route 2 (two) times daily.    GABAPENTIN (NEURONTIN) 300 MG CAPSULE    Take 300 mg by mouth 2 (two) times daily as needed.    LORAZEPAM (ATIVAN) 0.5 MG TABLET    Take 1 tablet (0.5 mg total) by mouth daily as needed for Anxiety.    METFORMIN (GLUCOPHAGE-XR) 500 MG ER 24HR TABLET    Take 2 tablets (1,000 mg total) by mouth daily with breakfast.    MONTELUKAST (SINGULAIR) 10 MG TABLET    Take 1 tablet (10 mg total) by mouth every evening.    MULTIVITAMIN-CA-IRON-MINERALS 18-0.4 MG TAB    Take 1 tablet by mouth once daily.    ONDANSETRON (ZOFRAN) 4 MG TABLET        PANTOPRAZOLE (PROTONIX) 40 MG TABLET    Take 1 tablet (40 mg total) by mouth every morning.    RIMEGEPANT (NURTEC) 75 MG ODT    DISSOLVE ONE TABLET BY MOUTH EVERY DAY AT ONSET OF MIGRAINE. NO MORE THAT 3 DAYS/WEEK   Changed and/or Refilled Medications    Modified Medication Previous Medication    DICLOFENAC (VOLTAREN) 50 MG EC TABLET diclofenac (VOLTAREN) 50 MG EC tablet       Take 1 tablet (50 mg total) by mouth 2 (two) times daily.    Take 1 tablet (50 mg total) by mouth 2 (two) times daily.    TIZANIDINE (ZANAFLEX) 4 MG TABLET tiZANidine (ZANAFLEX) 4 MG tablet       Take 1 tablet (4 mg total) by mouth every 6 (six) hours as needed (muscle spasm).    Take 1 tablet (4 mg total) by mouth every 6 (six) hours as needed (muscle spasm).   Discontinued Medications    OLMESARTAN  "(BENICAR) 20 MG TABLET    Take 1 tablet (20 mg total) by mouth once daily.        Objective:     Vitals:    08/08/25 1042 08/08/25 1101   BP: (!) 142/90 (!) 138/90   Pulse: 77    Temp: 98.5 °F (36.9 °C)    TempSrc: Tympanic    Weight: 91.6 kg (202 lb)    Height: 5' 4.5" (1.638 m)         Physical Exam  Constitutional:       Appearance: Normal appearance. She is normal weight.   HENT:      Head: Normocephalic and atraumatic.      Nose: Nose normal.   Eyes:      Conjunctiva/sclera: Conjunctivae normal.      Comments: Eyes tracking normal on exam    Cardiovascular:      Rate and Rhythm: Normal rate and regular rhythm.      Pulses: Normal pulses.      Heart sounds: Normal heart sounds. No murmur heard.     No friction rub. No gallop.   Pulmonary:      Effort: Pulmonary effort is normal. No respiratory distress.      Breath sounds: Normal breath sounds. No stridor. No wheezing, rhonchi or rales.   Musculoskeletal:      Right lower leg: No edema.      Left lower leg: No edema.      Comments: Moves all extremities well and with good control  Normal gait observed      Skin:     General: Skin is warm and dry.      Capillary Refill: Capillary refill takes less than 2 seconds.   Neurological:      Mental Status: She is alert and oriented to person, place, and time.   Psychiatric:         Mood and Affect: Mood normal.         Behavior: Behavior normal.         Thought Content: Thought content normal.         Judgment: Judgment normal.            Assessment & Plan:     1. Primary hypertension  -     olmesartan (BENICAR) 40 MG tablet; Take 1 tablet (40 mg total) by mouth once daily.  Dispense: 30 tablet; Refill: 0    2. Mid back pain  -     diclofenac (VOLTAREN) 50 MG EC tablet; Take 1 tablet (50 mg total) by mouth 2 (two) times daily.  Dispense: 60 tablet; Refill: 1  -     tiZANidine (ZANAFLEX) 4 MG tablet; Take 1 tablet (4 mg total) by mouth every 6 (six) hours as needed (muscle spasm).  Dispense: 30 tablet; Refill: 1     "     -NSAIDs: you have been prescribed a medications call a NSAID today.  While taking this medication do not take any over the counter ibuprofen (motrin) or naproxen (aleve)    -sedation precautions given for tizanidine    -increasing olmesartan to 40 mg daily, 3-4 week follow-up with      -Patient instructed that our office calls back on all labs and imaging within 1 week of receiving the results. Patient instructed to reach out to our office if they have not heard from us so that we can request the results from the lab/imaging center           Fatemeh Biswas PA-C

## 2025-08-13 ENCOUNTER — CLINICAL SUPPORT (OUTPATIENT)
Dept: REHABILITATION | Facility: HOSPITAL | Age: 35
End: 2025-08-13
Payer: COMMERCIAL

## 2025-08-13 DIAGNOSIS — M62.81 WEAKNESS OF TRUNK MUSCULATURE: Primary | ICD-10-CM

## 2025-08-13 PROCEDURE — 97110 THERAPEUTIC EXERCISES: CPT | Mod: PN

## 2025-08-13 PROCEDURE — 97112 NEUROMUSCULAR REEDUCATION: CPT | Mod: PN

## 2025-08-20 ENCOUNTER — CLINICAL SUPPORT (OUTPATIENT)
Dept: REHABILITATION | Facility: HOSPITAL | Age: 35
End: 2025-08-20
Payer: COMMERCIAL

## 2025-08-20 DIAGNOSIS — M62.81 WEAKNESS OF TRUNK MUSCULATURE: Primary | ICD-10-CM

## 2025-08-20 PROCEDURE — 97112 NEUROMUSCULAR REEDUCATION: CPT | Mod: PN

## 2025-08-20 PROCEDURE — 97110 THERAPEUTIC EXERCISES: CPT | Mod: PN

## 2025-08-21 DIAGNOSIS — I10 PRIMARY HYPERTENSION: Chronic | ICD-10-CM

## 2025-08-22 RX ORDER — OLMESARTAN MEDOXOMIL 40 MG/1
40 TABLET ORAL DAILY
Qty: 90 TABLET | Refills: 0 | Status: SHIPPED | OUTPATIENT
Start: 2025-08-22 | End: 2025-09-21

## 2025-09-03 ENCOUNTER — OFFICE VISIT (OUTPATIENT)
Dept: INTERNAL MEDICINE | Facility: CLINIC | Age: 35
End: 2025-09-03
Payer: COMMERCIAL

## 2025-09-03 VITALS
TEMPERATURE: 99 F | HEIGHT: 65 IN | BODY MASS INDEX: 33.79 KG/M2 | DIASTOLIC BLOOD PRESSURE: 82 MMHG | HEART RATE: 70 BPM | WEIGHT: 202.81 LBS | SYSTOLIC BLOOD PRESSURE: 130 MMHG

## 2025-09-03 DIAGNOSIS — M54.9 MID BACK PAIN: Primary | ICD-10-CM

## 2025-09-03 DIAGNOSIS — I10 PRIMARY HYPERTENSION: Chronic | ICD-10-CM

## 2025-09-03 PROCEDURE — 1159F MED LIST DOCD IN RCRD: CPT | Mod: CPTII,S$GLB,, | Performed by: PHYSICIAN ASSISTANT

## 2025-09-03 PROCEDURE — 3079F DIAST BP 80-89 MM HG: CPT | Mod: CPTII,S$GLB,, | Performed by: PHYSICIAN ASSISTANT

## 2025-09-03 PROCEDURE — 99214 OFFICE O/P EST MOD 30 MIN: CPT | Mod: S$GLB,,, | Performed by: PHYSICIAN ASSISTANT

## 2025-09-03 PROCEDURE — 4010F ACE/ARB THERAPY RXD/TAKEN: CPT | Mod: CPTII,S$GLB,, | Performed by: PHYSICIAN ASSISTANT

## 2025-09-03 PROCEDURE — 3008F BODY MASS INDEX DOCD: CPT | Mod: CPTII,S$GLB,, | Performed by: PHYSICIAN ASSISTANT

## 2025-09-03 PROCEDURE — 99999 PR PBB SHADOW E&M-EST. PATIENT-LVL IV: CPT | Mod: PBBFAC,,, | Performed by: PHYSICIAN ASSISTANT

## 2025-09-03 PROCEDURE — 1160F RVW MEDS BY RX/DR IN RCRD: CPT | Mod: CPTII,S$GLB,, | Performed by: PHYSICIAN ASSISTANT

## 2025-09-03 PROCEDURE — 3044F HG A1C LEVEL LT 7.0%: CPT | Mod: CPTII,S$GLB,, | Performed by: PHYSICIAN ASSISTANT

## 2025-09-03 PROCEDURE — 3075F SYST BP GE 130 - 139MM HG: CPT | Mod: CPTII,S$GLB,, | Performed by: PHYSICIAN ASSISTANT

## 2025-09-03 RX ORDER — OLMESARTAN MEDOXOMIL 20 MG/1
20 TABLET ORAL
COMMUNITY
Start: 2025-08-22 | End: 2025-09-03

## 2025-09-03 RX ORDER — OLMESARTAN MEDOXOMIL 40 MG/1
40 TABLET ORAL DAILY
Qty: 30 TABLET | Refills: 0 | Status: SHIPPED | OUTPATIENT
Start: 2025-09-03 | End: 2025-10-03

## 2025-09-03 RX ORDER — TIZANIDINE 4 MG/1
TABLET ORAL
COMMUNITY
Start: 2025-08-22

## 2025-09-04 ENCOUNTER — PATIENT OUTREACH (OUTPATIENT)
Dept: PULMONOLOGY | Facility: CLINIC | Age: 35
End: 2025-09-04
Payer: COMMERCIAL

## (undated) DEVICE — NDL PNEUMO INSUFFLATI 120MM

## (undated) DEVICE — SUPPORT ULNA NERVE PROTECTOR

## (undated) DEVICE — GLOVE SURG BIOGEL LATEX SZ 7.5

## (undated) DEVICE — SUT MONOCRYL 4.0 PS2 CP496G

## (undated) DEVICE — SCRUB HIBICLENS 4% CHG 4OZ

## (undated) DEVICE — BANDAGE MATRIX HK LOOP 2IN 5YD

## (undated) DEVICE — SOL NORMAL USPCA 0.9%

## (undated) DEVICE — APPLICATOR CHLORAPREP ORN 26ML

## (undated) DEVICE — ELECTRODE REM PLYHSV RETURN 9

## (undated) DEVICE — TOWEL OR DISP STRL BLUE 4/PK

## (undated) DEVICE — SYR 10CC LUER LOCK

## (undated) DEVICE — UNDERGLOVES BIOGEL PI SZ 6 LF

## (undated) DEVICE — PACK BASIC SETUP SC BR

## (undated) DEVICE — COVER TIP CURVED SCISSORS XI

## (undated) DEVICE — DRESSING XEROFORM NONADH 1X8IN

## (undated) DEVICE — GLOVE SURGEONS ULTRA TOUCH 6.5

## (undated) DEVICE — GOWN POLY REINF BRTH SLV XL

## (undated) DEVICE — ALCOHOL 70% ISOP RUBBING 4OZ

## (undated) DEVICE — COVER LIGHT HANDLE 80/CA

## (undated) DEVICE — NDL HYPO SAFETY 25GX1.5IN

## (undated) DEVICE — BAG TISSUE RETRIEVAL 5MM

## (undated) DEVICE — NDL BLNT STD 1.5IN 18G

## (undated) DEVICE — DRAPE HAND STERILE

## (undated) DEVICE — SOL 9P NACL IRR PIC IL

## (undated) DEVICE — DECANTER 6 VIAL

## (undated) DEVICE — HEADREST ROUND DISP FOAM 9IN

## (undated) DEVICE — TOURNIQUET SB QC DP 18X4IN

## (undated) DEVICE — CLIP HEMO-LOK ML

## (undated) DEVICE — SUT PROLENE 4-0 MONO 18IN

## (undated) DEVICE — DRAPE ABDOMINAL TIBURON 14X11

## (undated) DEVICE — KIT TURNOVER

## (undated) DEVICE — CORD BIPOLAR ELECTROSURGICAL

## (undated) DEVICE — OBTURATOR BLADELESS 8MM XI CLR

## (undated) DEVICE — SEAL UNIVERSAL 5MM-8MM XI

## (undated) DEVICE — KIT ANTIFOG W/SPONG & FLUID

## (undated) DEVICE — MANIFOLD 4 PORT

## (undated) DEVICE — DRAPE THREE-QUARTER 53X77IN

## (undated) DEVICE — GAUZE CNFRM STRL 2INX4.1YD

## (undated) DEVICE — SET PNEUMOCLEAR HEAT HUM SE HF

## (undated) DEVICE — UNDERGLOVES BIOGEL PI SIZE 7.5

## (undated) DEVICE — DRAPE ARM DAVINCI XI

## (undated) DEVICE — BANDAGE ESMARK ELASTIC ST 4X9

## (undated) DEVICE — DRAPE COLUMN DAVINCI XI

## (undated) DEVICE — SOL STRL WATER INJ 1000ML BG

## (undated) DEVICE — DRAPE THREE-QTR REINF 53X77IN

## (undated) DEVICE — GLOVE SURG ULTRA TOUCH 7.5

## (undated) DEVICE — SPONGE COTTON TRAY 4X4IN